# Patient Record
Sex: MALE | Race: WHITE | Employment: FULL TIME | URBAN - NONMETROPOLITAN AREA
[De-identification: names, ages, dates, MRNs, and addresses within clinical notes are randomized per-mention and may not be internally consistent; named-entity substitution may affect disease eponyms.]

---

## 2017-01-04 DIAGNOSIS — G89.29 CHRONIC LOW BACK PAIN, UNSPECIFIED BACK PAIN LATERALITY, WITH SCIATICA PRESENCE UNSPECIFIED: ICD-10-CM

## 2017-01-04 DIAGNOSIS — M54.5 CHRONIC LOW BACK PAIN, UNSPECIFIED BACK PAIN LATERALITY, WITH SCIATICA PRESENCE UNSPECIFIED: ICD-10-CM

## 2017-01-04 DIAGNOSIS — I10 ESSENTIAL HYPERTENSION: ICD-10-CM

## 2017-01-06 RX ORDER — CYCLOBENZAPRINE HCL 10 MG
TABLET ORAL
Qty: 30 TABLET | Refills: 5 | Status: SHIPPED | OUTPATIENT
Start: 2017-01-06 | End: 2017-02-27 | Stop reason: SDUPTHER

## 2017-01-06 RX ORDER — OMEPRAZOLE 20 MG/1
CAPSULE, DELAYED RELEASE ORAL
Qty: 30 CAPSULE | Refills: 5 | Status: SHIPPED | OUTPATIENT
Start: 2017-01-06 | End: 2017-02-27 | Stop reason: SDUPTHER

## 2017-01-06 RX ORDER — NAPROXEN 500 MG/1
TABLET ORAL
Qty: 60 TABLET | Refills: 5 | Status: SHIPPED | OUTPATIENT
Start: 2017-01-06 | End: 2017-06-07

## 2017-01-06 RX ORDER — AMLODIPINE BESYLATE 5 MG/1
TABLET ORAL
Qty: 30 TABLET | Refills: 5 | Status: SHIPPED | OUTPATIENT
Start: 2017-01-06 | End: 2017-02-27 | Stop reason: SDUPTHER

## 2017-01-09 ENCOUNTER — OFFICE VISIT (OUTPATIENT)
Dept: FAMILY MEDICINE CLINIC | Age: 47
End: 2017-01-09

## 2017-01-09 VITALS
DIASTOLIC BLOOD PRESSURE: 84 MMHG | OXYGEN SATURATION: 97 % | SYSTOLIC BLOOD PRESSURE: 120 MMHG | WEIGHT: 297.2 LBS | HEART RATE: 73 BPM | BODY MASS INDEX: 38.14 KG/M2 | HEIGHT: 74 IN

## 2017-01-09 DIAGNOSIS — M54.5 CHRONIC LOW BACK PAIN, UNSPECIFIED BACK PAIN LATERALITY, WITH SCIATICA PRESENCE UNSPECIFIED: Primary | ICD-10-CM

## 2017-01-09 DIAGNOSIS — I10 ESSENTIAL HYPERTENSION: ICD-10-CM

## 2017-01-09 DIAGNOSIS — G89.29 CHRONIC LOW BACK PAIN, UNSPECIFIED BACK PAIN LATERALITY, WITH SCIATICA PRESENCE UNSPECIFIED: Primary | ICD-10-CM

## 2017-01-09 DIAGNOSIS — E66.9 OBESITY, UNSPECIFIED OBESITY SEVERITY, UNSPECIFIED OBESITY TYPE: ICD-10-CM

## 2017-01-09 DIAGNOSIS — K21.9 GASTROESOPHAGEAL REFLUX DISEASE WITHOUT ESOPHAGITIS: ICD-10-CM

## 2017-01-09 PROCEDURE — 99213 OFFICE O/P EST LOW 20 MIN: CPT | Performed by: FAMILY MEDICINE

## 2017-01-09 RX ORDER — CELECOXIB 200 MG/1
200 CAPSULE ORAL 2 TIMES DAILY
Qty: 60 CAPSULE | Refills: 3 | Status: SHIPPED | OUTPATIENT
Start: 2017-01-09 | End: 2017-02-27 | Stop reason: SDUPTHER

## 2017-02-27 DIAGNOSIS — I10 ESSENTIAL HYPERTENSION: ICD-10-CM

## 2017-02-27 DIAGNOSIS — G89.29 CHRONIC LOW BACK PAIN, UNSPECIFIED BACK PAIN LATERALITY, WITH SCIATICA PRESENCE UNSPECIFIED: ICD-10-CM

## 2017-02-27 DIAGNOSIS — M54.5 CHRONIC LOW BACK PAIN, UNSPECIFIED BACK PAIN LATERALITY, WITH SCIATICA PRESENCE UNSPECIFIED: ICD-10-CM

## 2017-02-27 RX ORDER — OMEPRAZOLE 20 MG/1
CAPSULE, DELAYED RELEASE ORAL
Qty: 90 CAPSULE | Refills: 1 | Status: SHIPPED | OUTPATIENT
Start: 2017-02-27 | End: 2017-06-07 | Stop reason: SDUPTHER

## 2017-02-27 RX ORDER — AMLODIPINE BESYLATE 5 MG/1
TABLET ORAL
Qty: 90 TABLET | Refills: 1 | Status: SHIPPED | OUTPATIENT
Start: 2017-02-27 | End: 2017-06-07 | Stop reason: SDUPTHER

## 2017-02-27 RX ORDER — CYCLOBENZAPRINE HCL 10 MG
TABLET ORAL
Qty: 90 TABLET | Refills: 1 | Status: SHIPPED | OUTPATIENT
Start: 2017-02-27 | End: 2017-06-07 | Stop reason: SDUPTHER

## 2017-02-27 RX ORDER — CELECOXIB 200 MG/1
200 CAPSULE ORAL 2 TIMES DAILY
Qty: 180 CAPSULE | Refills: 1 | Status: SHIPPED | OUTPATIENT
Start: 2017-02-27 | End: 2017-08-28 | Stop reason: SDUPTHER

## 2017-06-07 ENCOUNTER — OFFICE VISIT (OUTPATIENT)
Dept: FAMILY MEDICINE CLINIC | Age: 47
End: 2017-06-07

## 2017-06-07 VITALS
WEIGHT: 280 LBS | DIASTOLIC BLOOD PRESSURE: 86 MMHG | HEART RATE: 80 BPM | BODY MASS INDEX: 35.94 KG/M2 | SYSTOLIC BLOOD PRESSURE: 120 MMHG | HEIGHT: 74 IN | OXYGEN SATURATION: 97 %

## 2017-06-07 DIAGNOSIS — Z00.00 PREVENTATIVE HEALTH CARE: ICD-10-CM

## 2017-06-07 DIAGNOSIS — Z11.4 SCREENING FOR HIV (HUMAN IMMUNODEFICIENCY VIRUS): ICD-10-CM

## 2017-06-07 DIAGNOSIS — I10 ESSENTIAL HYPERTENSION: ICD-10-CM

## 2017-06-07 DIAGNOSIS — K21.9 GASTROESOPHAGEAL REFLUX DISEASE, ESOPHAGITIS PRESENCE NOT SPECIFIED: ICD-10-CM

## 2017-06-07 DIAGNOSIS — M54.5 CHRONIC LOW BACK PAIN, UNSPECIFIED BACK PAIN LATERALITY, WITH SCIATICA PRESENCE UNSPECIFIED: ICD-10-CM

## 2017-06-07 DIAGNOSIS — Z00.00 PREVENTATIVE HEALTH CARE: Primary | ICD-10-CM

## 2017-06-07 DIAGNOSIS — G89.29 CHRONIC LOW BACK PAIN, UNSPECIFIED BACK PAIN LATERALITY, WITH SCIATICA PRESENCE UNSPECIFIED: ICD-10-CM

## 2017-06-07 LAB
ALBUMIN SERPL-MCNC: 4.8 G/DL (ref 3.9–4.9)
ALP BLD-CCNC: 55 U/L (ref 35–104)
ALT SERPL-CCNC: 36 U/L (ref 0–41)
ANION GAP SERPL CALCULATED.3IONS-SCNC: 14 MEQ/L (ref 7–13)
AST SERPL-CCNC: 25 U/L (ref 0–40)
BILIRUB SERPL-MCNC: 0.8 MG/DL (ref 0–1.2)
BUN BLDV-MCNC: 16 MG/DL (ref 6–20)
CALCIUM SERPL-MCNC: 9.5 MG/DL (ref 8.6–10.2)
CHLORIDE BLD-SCNC: 100 MEQ/L (ref 98–107)
CHOLESTEROL, TOTAL: 200 MG/DL (ref 0–199)
CO2: 25 MEQ/L (ref 22–29)
CREAT SERPL-MCNC: 0.8 MG/DL (ref 0.7–1.2)
GFR AFRICAN AMERICAN: >60
GFR NON-AFRICAN AMERICAN: >60
GLOBULIN: 2.5 G/DL (ref 2.3–3.5)
GLUCOSE BLD-MCNC: 88 MG/DL (ref 74–109)
HCT VFR BLD CALC: 44 % (ref 42–52)
HDLC SERPL-MCNC: 49 MG/DL (ref 40–59)
HEMOGLOBIN: 14.8 G/DL (ref 14–18)
LDL CHOLESTEROL CALCULATED: 138 MG/DL (ref 0–129)
MCH RBC QN AUTO: 29.8 PG (ref 27–31.3)
MCHC RBC AUTO-ENTMCNC: 33.8 % (ref 33–37)
MCV RBC AUTO: 88.3 FL (ref 80–100)
PDW BLD-RTO: 13.3 % (ref 11.5–14.5)
PLATELET # BLD: 269 K/UL (ref 130–400)
POTASSIUM SERPL-SCNC: 4.8 MEQ/L (ref 3.5–5.1)
RBC # BLD: 4.98 M/UL (ref 4.7–6.1)
SODIUM BLD-SCNC: 139 MEQ/L (ref 132–144)
TOTAL PROTEIN: 7.3 G/DL (ref 6.4–8.1)
TRIGL SERPL-MCNC: 67 MG/DL (ref 0–200)
WBC # BLD: 4.7 K/UL (ref 4.8–10.8)

## 2017-06-07 PROCEDURE — 99396 PREV VISIT EST AGE 40-64: CPT | Performed by: FAMILY MEDICINE

## 2017-06-07 RX ORDER — OMEPRAZOLE 20 MG/1
CAPSULE, DELAYED RELEASE ORAL
Qty: 90 CAPSULE | Refills: 3 | Status: SHIPPED | OUTPATIENT
Start: 2017-06-07 | End: 2018-09-11 | Stop reason: SDUPTHER

## 2017-06-07 RX ORDER — CYCLOBENZAPRINE HCL 10 MG
TABLET ORAL
Qty: 90 TABLET | Refills: 3 | Status: SHIPPED | OUTPATIENT
Start: 2017-06-07 | End: 2018-09-11 | Stop reason: SDUPTHER

## 2017-06-07 RX ORDER — AMLODIPINE BESYLATE 5 MG/1
TABLET ORAL
Qty: 90 TABLET | Refills: 3 | Status: SHIPPED | OUTPATIENT
Start: 2017-06-07 | End: 2018-09-11 | Stop reason: SDUPTHER

## 2017-06-07 RX ORDER — NAPROXEN 500 MG/1
TABLET ORAL
Qty: 60 TABLET | Refills: 5 | Status: CANCELLED | OUTPATIENT
Start: 2017-06-07

## 2017-06-07 RX ORDER — GABAPENTIN 300 MG/1
300 CAPSULE ORAL EVERY EVENING
Qty: 90 CAPSULE | Refills: 3 | Status: SHIPPED | OUTPATIENT
Start: 2017-06-07 | End: 2018-05-27 | Stop reason: SDUPTHER

## 2017-06-07 ASSESSMENT — PATIENT HEALTH QUESTIONNAIRE - PHQ9
1. LITTLE INTEREST OR PLEASURE IN DOING THINGS: 0
SUM OF ALL RESPONSES TO PHQ9 QUESTIONS 1 & 2: 0
SUM OF ALL RESPONSES TO PHQ QUESTIONS 1-9: 0
2. FEELING DOWN, DEPRESSED OR HOPELESS: 0

## 2017-06-09 LAB — HIV-1 AND HIV-2 ANTIBODIES: NEGATIVE

## 2017-08-28 DIAGNOSIS — M54.5 CHRONIC LOW BACK PAIN, UNSPECIFIED BACK PAIN LATERALITY, WITH SCIATICA PRESENCE UNSPECIFIED: ICD-10-CM

## 2017-08-28 DIAGNOSIS — G89.29 CHRONIC LOW BACK PAIN, UNSPECIFIED BACK PAIN LATERALITY, WITH SCIATICA PRESENCE UNSPECIFIED: ICD-10-CM

## 2017-08-28 RX ORDER — CELECOXIB 200 MG/1
CAPSULE ORAL
Qty: 180 CAPSULE | Refills: 1 | Status: SHIPPED | OUTPATIENT
Start: 2017-08-28 | End: 2018-02-11 | Stop reason: SDUPTHER

## 2018-02-11 DIAGNOSIS — M54.5 CHRONIC LOW BACK PAIN, UNSPECIFIED BACK PAIN LATERALITY, WITH SCIATICA PRESENCE UNSPECIFIED: ICD-10-CM

## 2018-02-11 DIAGNOSIS — G89.29 CHRONIC LOW BACK PAIN, UNSPECIFIED BACK PAIN LATERALITY, WITH SCIATICA PRESENCE UNSPECIFIED: ICD-10-CM

## 2018-02-12 RX ORDER — CELECOXIB 200 MG/1
CAPSULE ORAL
Qty: 180 CAPSULE | Refills: 1 | Status: SHIPPED | OUTPATIENT
Start: 2018-02-12 | End: 2018-09-11 | Stop reason: SDUPTHER

## 2018-04-10 ENCOUNTER — PROCEDURE VISIT (OUTPATIENT)
Dept: FAMILY MEDICINE CLINIC | Age: 48
End: 2018-04-10
Payer: COMMERCIAL

## 2018-04-10 DIAGNOSIS — R20.9 DISTURBANCE OF SKIN SENSATION: ICD-10-CM

## 2018-04-10 DIAGNOSIS — K21.9 GASTROESOPHAGEAL REFLUX DISEASE WITHOUT ESOPHAGITIS: ICD-10-CM

## 2018-04-10 DIAGNOSIS — L91.8 INFLAMED SKIN TAG: Primary | ICD-10-CM

## 2018-04-10 PROCEDURE — 11200 RMVL SKIN TAGS UP TO&INC 15: CPT | Performed by: FAMILY MEDICINE

## 2018-04-10 PROCEDURE — 99212 OFFICE O/P EST SF 10 MIN: CPT | Performed by: FAMILY MEDICINE

## 2018-05-27 DIAGNOSIS — M54.5 CHRONIC LOW BACK PAIN, UNSPECIFIED BACK PAIN LATERALITY, WITH SCIATICA PRESENCE UNSPECIFIED: ICD-10-CM

## 2018-05-27 DIAGNOSIS — G89.29 CHRONIC LOW BACK PAIN, UNSPECIFIED BACK PAIN LATERALITY, WITH SCIATICA PRESENCE UNSPECIFIED: ICD-10-CM

## 2018-05-29 RX ORDER — GABAPENTIN 300 MG/1
300 CAPSULE ORAL EVERY EVENING
Qty: 90 CAPSULE | Refills: 3 | Status: SHIPPED | OUTPATIENT
Start: 2018-05-29 | End: 2019-06-10 | Stop reason: SDUPTHER

## 2018-06-25 ENCOUNTER — OFFICE VISIT (OUTPATIENT)
Dept: FAMILY MEDICINE CLINIC | Age: 48
End: 2018-06-25
Payer: COMMERCIAL

## 2018-06-25 VITALS
WEIGHT: 284 LBS | DIASTOLIC BLOOD PRESSURE: 84 MMHG | HEART RATE: 89 BPM | HEIGHT: 74 IN | OXYGEN SATURATION: 98 % | BODY MASS INDEX: 36.45 KG/M2 | TEMPERATURE: 98.6 F | SYSTOLIC BLOOD PRESSURE: 124 MMHG

## 2018-06-25 DIAGNOSIS — Z00.00 WELLNESS EXAMINATION: Primary | ICD-10-CM

## 2018-06-25 DIAGNOSIS — Z72.0 TOBACCO USE: ICD-10-CM

## 2018-06-25 DIAGNOSIS — Z00.00 WELLNESS EXAMINATION: ICD-10-CM

## 2018-06-25 LAB
ALBUMIN SERPL-MCNC: 4.9 G/DL (ref 3.9–4.9)
ALP BLD-CCNC: 57 U/L (ref 35–104)
ALT SERPL-CCNC: 46 U/L (ref 0–41)
ANION GAP SERPL CALCULATED.3IONS-SCNC: 15 MEQ/L (ref 7–13)
AST SERPL-CCNC: 27 U/L (ref 0–40)
BILIRUB SERPL-MCNC: 1.1 MG/DL (ref 0–1.2)
BUN BLDV-MCNC: 18 MG/DL (ref 6–20)
CALCIUM SERPL-MCNC: 9.6 MG/DL (ref 8.6–10.2)
CHLORIDE BLD-SCNC: 99 MEQ/L (ref 98–107)
CHOLESTEROL, TOTAL: 198 MG/DL (ref 0–199)
CO2: 25 MEQ/L (ref 22–29)
CREAT SERPL-MCNC: 0.94 MG/DL (ref 0.7–1.2)
GFR AFRICAN AMERICAN: >60
GFR NON-AFRICAN AMERICAN: >60
GLOBULIN: 2.9 G/DL (ref 2.3–3.5)
GLUCOSE BLD-MCNC: 69 MG/DL (ref 74–109)
HCT VFR BLD CALC: 44.2 % (ref 42–52)
HDLC SERPL-MCNC: 47 MG/DL (ref 40–59)
HEMOGLOBIN: 14.9 G/DL (ref 14–18)
LDL CHOLESTEROL CALCULATED: 139 MG/DL (ref 0–129)
MCH RBC QN AUTO: 29.9 PG (ref 27–31.3)
MCHC RBC AUTO-ENTMCNC: 33.8 % (ref 33–37)
MCV RBC AUTO: 88.7 FL (ref 80–100)
PDW BLD-RTO: 13.6 % (ref 11.5–14.5)
PLATELET # BLD: 305 K/UL (ref 130–400)
POTASSIUM SERPL-SCNC: 4.4 MEQ/L (ref 3.5–5.1)
RBC # BLD: 4.99 M/UL (ref 4.7–6.1)
SODIUM BLD-SCNC: 139 MEQ/L (ref 132–144)
TOTAL PROTEIN: 7.8 G/DL (ref 6.4–8.1)
TRIGL SERPL-MCNC: 59 MG/DL (ref 0–200)
WBC # BLD: 7.5 K/UL (ref 4.8–10.8)

## 2018-06-25 PROCEDURE — 99396 PREV VISIT EST AGE 40-64: CPT | Performed by: FAMILY MEDICINE

## 2018-06-25 ASSESSMENT — PATIENT HEALTH QUESTIONNAIRE - PHQ9
1. LITTLE INTEREST OR PLEASURE IN DOING THINGS: 0
SUM OF ALL RESPONSES TO PHQ9 QUESTIONS 1 & 2: 0
SUM OF ALL RESPONSES TO PHQ QUESTIONS 1-9: 0

## 2018-09-11 DIAGNOSIS — K21.9 GASTROESOPHAGEAL REFLUX DISEASE, ESOPHAGITIS PRESENCE NOT SPECIFIED: ICD-10-CM

## 2018-09-11 DIAGNOSIS — M54.5 CHRONIC LOW BACK PAIN, UNSPECIFIED BACK PAIN LATERALITY, WITH SCIATICA PRESENCE UNSPECIFIED: ICD-10-CM

## 2018-09-11 DIAGNOSIS — G89.29 CHRONIC LOW BACK PAIN, UNSPECIFIED BACK PAIN LATERALITY, WITH SCIATICA PRESENCE UNSPECIFIED: ICD-10-CM

## 2018-09-11 DIAGNOSIS — I10 ESSENTIAL HYPERTENSION: ICD-10-CM

## 2018-09-11 RX ORDER — OMEPRAZOLE 20 MG/1
CAPSULE, DELAYED RELEASE ORAL
Qty: 90 CAPSULE | Refills: 3 | Status: SHIPPED | OUTPATIENT
Start: 2018-09-11 | End: 2019-06-10 | Stop reason: SDUPTHER

## 2018-09-11 RX ORDER — AMLODIPINE BESYLATE 5 MG/1
TABLET ORAL
Qty: 90 TABLET | Refills: 3 | Status: SHIPPED | OUTPATIENT
Start: 2018-09-11 | End: 2019-06-10 | Stop reason: SDUPTHER

## 2018-09-11 RX ORDER — CELECOXIB 200 MG/1
CAPSULE ORAL
Qty: 180 CAPSULE | Refills: 1 | Status: SHIPPED | OUTPATIENT
Start: 2018-09-11 | End: 2019-06-10 | Stop reason: SDUPTHER

## 2018-09-11 RX ORDER — CYCLOBENZAPRINE HCL 10 MG
TABLET ORAL
Qty: 90 TABLET | Refills: 3 | Status: SHIPPED | OUTPATIENT
Start: 2018-09-11 | End: 2019-06-10 | Stop reason: SDUPTHER

## 2019-06-10 ENCOUNTER — OFFICE VISIT (OUTPATIENT)
Dept: FAMILY MEDICINE CLINIC | Age: 49
End: 2019-06-10
Payer: COMMERCIAL

## 2019-06-10 VITALS
WEIGHT: 300 LBS | HEIGHT: 74 IN | DIASTOLIC BLOOD PRESSURE: 68 MMHG | SYSTOLIC BLOOD PRESSURE: 132 MMHG | BODY MASS INDEX: 38.5 KG/M2 | OXYGEN SATURATION: 98 % | HEART RATE: 79 BPM

## 2019-06-10 DIAGNOSIS — Z00.00 WELLNESS EXAMINATION: ICD-10-CM

## 2019-06-10 DIAGNOSIS — Z00.00 WELLNESS EXAMINATION: Primary | ICD-10-CM

## 2019-06-10 DIAGNOSIS — K21.9 GASTROESOPHAGEAL REFLUX DISEASE, ESOPHAGITIS PRESENCE NOT SPECIFIED: ICD-10-CM

## 2019-06-10 DIAGNOSIS — M54.5 CHRONIC LOW BACK PAIN, UNSPECIFIED BACK PAIN LATERALITY, WITH SCIATICA PRESENCE UNSPECIFIED: ICD-10-CM

## 2019-06-10 DIAGNOSIS — I10 ESSENTIAL HYPERTENSION: ICD-10-CM

## 2019-06-10 DIAGNOSIS — G89.29 CHRONIC LOW BACK PAIN, UNSPECIFIED BACK PAIN LATERALITY, WITH SCIATICA PRESENCE UNSPECIFIED: ICD-10-CM

## 2019-06-10 LAB
ALBUMIN SERPL-MCNC: 4.6 G/DL (ref 3.5–4.6)
ALP BLD-CCNC: 56 U/L (ref 35–104)
ALT SERPL-CCNC: 32 U/L (ref 0–41)
ANION GAP SERPL CALCULATED.3IONS-SCNC: 12 MEQ/L (ref 9–15)
AST SERPL-CCNC: 18 U/L (ref 0–40)
BILIRUB SERPL-MCNC: 0.7 MG/DL (ref 0.2–0.7)
BUN BLDV-MCNC: 16 MG/DL (ref 6–20)
CALCIUM SERPL-MCNC: 9.3 MG/DL (ref 8.5–9.9)
CHLORIDE BLD-SCNC: 101 MEQ/L (ref 95–107)
CHOLESTEROL, TOTAL: 162 MG/DL (ref 0–199)
CO2: 27 MEQ/L (ref 20–31)
CREAT SERPL-MCNC: 0.89 MG/DL (ref 0.7–1.2)
GFR AFRICAN AMERICAN: >60
GFR NON-AFRICAN AMERICAN: >60
GLOBULIN: 2.7 G/DL (ref 2.3–3.5)
GLUCOSE BLD-MCNC: 85 MG/DL (ref 70–99)
HCT VFR BLD CALC: 41.6 % (ref 42–52)
HDLC SERPL-MCNC: 42 MG/DL (ref 40–59)
HEMOGLOBIN: 14.7 G/DL (ref 14–18)
LDL CHOLESTEROL CALCULATED: 105 MG/DL (ref 0–129)
MCH RBC QN AUTO: 31.3 PG (ref 27–31.3)
MCHC RBC AUTO-ENTMCNC: 35.3 % (ref 33–37)
MCV RBC AUTO: 88.7 FL (ref 80–100)
PDW BLD-RTO: 13.3 % (ref 11.5–14.5)
PLATELET # BLD: 281 K/UL (ref 130–400)
POTASSIUM SERPL-SCNC: 4.5 MEQ/L (ref 3.4–4.9)
PROSTATE SPECIFIC ANTIGEN: 1.07 NG/ML
RBC # BLD: 4.68 M/UL (ref 4.7–6.1)
SODIUM BLD-SCNC: 140 MEQ/L (ref 135–144)
TOTAL PROTEIN: 7.3 G/DL (ref 6.3–8)
TRIGL SERPL-MCNC: 76 MG/DL (ref 0–150)
WBC # BLD: 6 K/UL (ref 4.8–10.8)

## 2019-06-10 PROCEDURE — 99396 PREV VISIT EST AGE 40-64: CPT | Performed by: FAMILY MEDICINE

## 2019-06-10 RX ORDER — CELECOXIB 200 MG/1
CAPSULE ORAL
Qty: 180 CAPSULE | Refills: 2 | Status: SHIPPED | OUTPATIENT
Start: 2019-06-10 | End: 2020-01-30 | Stop reason: SDUPTHER

## 2019-06-10 RX ORDER — GABAPENTIN 300 MG/1
300 CAPSULE ORAL EVERY EVENING
Qty: 90 CAPSULE | Refills: 3 | Status: SHIPPED | OUTPATIENT
Start: 2019-06-10 | End: 2020-09-10

## 2019-06-10 RX ORDER — OMEPRAZOLE 20 MG/1
CAPSULE, DELAYED RELEASE ORAL
Qty: 90 CAPSULE | Refills: 3 | Status: SHIPPED | OUTPATIENT
Start: 2019-06-10 | End: 2020-01-30 | Stop reason: SDUPTHER

## 2019-06-10 RX ORDER — CYCLOBENZAPRINE HCL 10 MG
TABLET ORAL
Qty: 90 TABLET | Refills: 3 | Status: SHIPPED | OUTPATIENT
Start: 2019-06-10 | End: 2020-01-30 | Stop reason: SDUPTHER

## 2019-06-10 RX ORDER — AMLODIPINE BESYLATE 5 MG/1
TABLET ORAL
Qty: 90 TABLET | Refills: 3 | Status: SHIPPED | OUTPATIENT
Start: 2019-06-10 | End: 2020-01-30 | Stop reason: SDUPTHER

## 2019-06-10 ASSESSMENT — PATIENT HEALTH QUESTIONNAIRE - PHQ9
SUM OF ALL RESPONSES TO PHQ QUESTIONS 1-9: 0
1. LITTLE INTEREST OR PLEASURE IN DOING THINGS: 0
SUM OF ALL RESPONSES TO PHQ QUESTIONS 1-9: 0
SUM OF ALL RESPONSES TO PHQ9 QUESTIONS 1 & 2: 0
2. FEELING DOWN, DEPRESSED OR HOPELESS: 0

## 2019-06-10 NOTE — PROGRESS NOTES
Subjective:      Chief Complaint   Patient presents with    Annual Exam     be well exam        Bridgette Curiel is a 50 y.o. male     Here for checkup  Needs Annual labs  Only issue is plantar fasciitis    Active     Does use e-cig    RE: chronic low back pain    Previous treatments: injections in lower back. Had been on percocet for many years  Scared of the medication  Feels needs something else  He is taking celebrex    Chronic issues under control with current regimen   See ROS     No concerns with meds  Compliant with therapy  Trying to be active    Wt Readings from Last 3 Encounters:   06/10/19 300 lb (136.1 kg)   06/25/18 284 lb (128.8 kg)   06/07/17 280 lb (127 kg)         Past Medical History:   Diagnosis Date    Chronic back pain     GERD (gastroesophageal reflux disease)     Heartburn     HTN (hypertension)      Past Surgical History:   Procedure Laterality Date    APPENDECTOMY      BACK SURGERY      NECK SURGERY      PILONIDAL CYST EXCISION  2005    TONSILLECTOMY      UPPER GASTROINTESTINAL ENDOSCOPY  2008    Hiatal hernia, normal stomach, normal duodenum     Family History   Problem Relation Age of Onset    High Blood Pressure Father     Diabetes Father     Crohn's Disease Brother      Social History     Socioeconomic History    Marital status:      Spouse name: None    Number of children: None    Years of education: None    Highest education level: None   Occupational History    None   Social Needs    Financial resource strain: None    Food insecurity:     Worry: None     Inability: None    Transportation needs:     Medical: None     Non-medical: None   Tobacco Use    Smoking status: Current Every Day Smoker     Packs/day: 0.00    Smokeless tobacco: Never Used    Tobacco comment: Vapor   Substance and Sexual Activity    Alcohol use:  Yes     Alcohol/week: 0.0 oz     Comment: occasionally    Drug use: No    Sexual activity: None   Lifestyle    Physical activity: Days per week: None     Minutes per session: None    Stress: None   Relationships    Social connections:     Talks on phone: None     Gets together: None     Attends Mormon service: None     Active member of club or organization: None     Attends meetings of clubs or organizations: None     Relationship status: None    Intimate partner violence:     Fear of current or ex partner: None     Emotionally abused: None     Physically abused: None     Forced sexual activity: None   Other Topics Concern    None   Social History Narrative    None     Current Outpatient Medications   Medication Sig Dispense Refill    gabapentin (NEURONTIN) 300 MG capsule Take 1 capsule by mouth every evening for 180 days. 90 capsule 3    omeprazole (PRILOSEC) 20 MG delayed release capsule TAKE 1 CAPSULE BY MOUTH ONE TIME DAILY 90 capsule 3    amLODIPine (NORVASC) 5 MG tablet TAKE 1 TABLET BY MOUTH ONE TIME DAILY 90 tablet 3    celecoxib (CELEBREX) 200 MG capsule TAKE ONE CAPSULE BY MOUTH TWICE A  capsule 2    cyclobenzaprine (FLEXERIL) 10 MG tablet TAKE 1 TABLET BY MOUTH ONE TIME DAILY 90 tablet 3     No current facility-administered medications for this visit. No Known Allergies      Review of Systems:   General ROS: negative for - nausea, vomiting, chills, fatigue, fever, malaise, weight gain or weight loss  Respiratory ROS: no cough, shortness of breath, or wheezing  Cardiovascular ROS: no chest pain or dyspnea on exertion  Gastrointestinal ROS: no abdominal pain, change in bowel habits, or black or bloody stools  Genito-Urinary ROS: no dysuria, trouble voiding, or hematuria  Musculoskeletal ROS: chronic low back pain  Neurological ROS: negative for - behavioral changes, memory loss, numbness/tingling, tremors or weakness    Exercise: walking at work, not really with extra exercise    In general patient otherwise reports feeling well.        Objective:     Physical Exam:  /68   Pulse 79   Ht 6' 2\" (1.88 m) Wt 300 lb (136.1 kg)   SpO2 98%   BMI 38.52 kg/m²     Waist Circumference 47in    Gen: Well, NAD, Alert, Oriented x 3   HEENT: EOMI, eyes clear, MMM  Skin: without rash or jaundice  Neck: no significant lymphadenopathy or thyromegaly  Lungs: CTA B w/out Rales/Wheezes/Rhonchi, Good respiratory effort   Heart: RRR, S1S2, w/out M/R/G, non-displaced PMI   Neuro: Neurovascularly intact w/ Sensory/Motor intact UE/LE Bilaterally. He is generally comfortable at this time in the office      Assessment:      Diagnosis Orders   1. Wellness examination  CBC    Comprehensive Metabolic Panel    Lipid Panel    Psa screening   2. Gastroesophageal reflux disease, esophagitis presence not specified  omeprazole (PRILOSEC) 20 MG delayed release capsule   3. Chronic low back pain, unspecified back pain laterality, with sciatica presence unspecified  gabapentin (NEURONTIN) 300 MG capsule    celecoxib (CELEBREX) 200 MG capsule    cyclobenzaprine (FLEXERIL) 10 MG tablet   4. Essential hypertension  amLODIPine (NORVASC) 5 MG tablet        Plan:     Refills given  Labs as ordered    Patient Counseling:  --Nutrition: Stressed importance of moderation in sodium/caffeine intake, saturated fat and cholesterol, caloric balance, sufficient intake of fresh fruits, vegetables, fiber-  --Exercise: Stressed the importance of regular exercise. --Dental health: Discussed importance of regulardental visits.   --Immunizations reviewed UTD    Discussed diet and exercise  Weight loss              Bong Lares MD

## 2019-09-17 DIAGNOSIS — G89.29 CHRONIC LOW BACK PAIN, UNSPECIFIED BACK PAIN LATERALITY, WITH SCIATICA PRESENCE UNSPECIFIED: ICD-10-CM

## 2019-09-17 DIAGNOSIS — M54.5 CHRONIC LOW BACK PAIN, UNSPECIFIED BACK PAIN LATERALITY, WITH SCIATICA PRESENCE UNSPECIFIED: ICD-10-CM

## 2019-09-18 RX ORDER — GABAPENTIN 300 MG/1
CAPSULE ORAL
Qty: 90 CAPSULE | Refills: 1 | Status: SHIPPED | OUTPATIENT
Start: 2019-09-18 | End: 2020-01-30 | Stop reason: SDUPTHER

## 2020-01-30 ENCOUNTER — OFFICE VISIT (OUTPATIENT)
Dept: FAMILY MEDICINE CLINIC | Age: 50
End: 2020-01-30
Payer: COMMERCIAL

## 2020-01-30 VITALS
OXYGEN SATURATION: 97 % | BODY MASS INDEX: 37.86 KG/M2 | DIASTOLIC BLOOD PRESSURE: 84 MMHG | WEIGHT: 295 LBS | HEIGHT: 74 IN | HEART RATE: 78 BPM | SYSTOLIC BLOOD PRESSURE: 118 MMHG

## 2020-01-30 DIAGNOSIS — M79.675 TOE PAIN, LEFT: ICD-10-CM

## 2020-01-30 DIAGNOSIS — B00.89 HERPETIC WHITLOW: ICD-10-CM

## 2020-01-30 DIAGNOSIS — I10 ESSENTIAL HYPERTENSION: ICD-10-CM

## 2020-01-30 LAB
ALBUMIN SERPL-MCNC: 4.7 G/DL (ref 3.5–4.6)
ALP BLD-CCNC: 57 U/L (ref 35–104)
ALT SERPL-CCNC: 44 U/L (ref 0–41)
ANION GAP SERPL CALCULATED.3IONS-SCNC: 19 MEQ/L (ref 9–15)
AST SERPL-CCNC: 26 U/L (ref 0–40)
BILIRUB SERPL-MCNC: 1.1 MG/DL (ref 0.2–0.7)
BUN BLDV-MCNC: 15 MG/DL (ref 6–20)
CALCIUM SERPL-MCNC: 9.5 MG/DL (ref 8.5–9.9)
CHLORIDE BLD-SCNC: 102 MEQ/L (ref 95–107)
CHOLESTEROL, TOTAL: 172 MG/DL (ref 0–199)
CO2: 23 MEQ/L (ref 20–31)
CREAT SERPL-MCNC: 0.81 MG/DL (ref 0.7–1.2)
GFR AFRICAN AMERICAN: >60
GFR NON-AFRICAN AMERICAN: >60
GLOBULIN: 3 G/DL (ref 2.3–3.5)
GLUCOSE BLD-MCNC: 79 MG/DL (ref 70–99)
HCT VFR BLD CALC: 47.2 % (ref 42–52)
HDLC SERPL-MCNC: 41 MG/DL (ref 40–59)
HEMOGLOBIN: 16.1 G/DL (ref 14–18)
LDL CHOLESTEROL CALCULATED: 119 MG/DL (ref 0–129)
MCH RBC QN AUTO: 30.2 PG (ref 27–31.3)
MCHC RBC AUTO-ENTMCNC: 34.1 % (ref 33–37)
MCV RBC AUTO: 88.6 FL (ref 80–100)
PDW BLD-RTO: 13.5 % (ref 11.5–14.5)
PLATELET # BLD: 330 K/UL (ref 130–400)
POTASSIUM SERPL-SCNC: 4.3 MEQ/L (ref 3.4–4.9)
RBC # BLD: 5.32 M/UL (ref 4.7–6.1)
SODIUM BLD-SCNC: 144 MEQ/L (ref 135–144)
TOTAL PROTEIN: 7.7 G/DL (ref 6.3–8)
TRIGL SERPL-MCNC: 61 MG/DL (ref 0–150)
URIC ACID, SERUM: 6.8 MG/DL (ref 3.4–7)
WBC # BLD: 6.5 K/UL (ref 4.8–10.8)

## 2020-01-30 PROCEDURE — 99214 OFFICE O/P EST MOD 30 MIN: CPT | Performed by: FAMILY MEDICINE

## 2020-01-30 RX ORDER — CYCLOBENZAPRINE HCL 10 MG
TABLET ORAL
Qty: 90 TABLET | Refills: 3 | Status: SHIPPED | OUTPATIENT
Start: 2020-01-30 | End: 2022-09-23 | Stop reason: SDUPTHER

## 2020-01-30 RX ORDER — METHYLPREDNISOLONE 4 MG/1
TABLET ORAL
Qty: 1 KIT | Refills: 0 | Status: SHIPPED | OUTPATIENT
Start: 2020-01-30 | End: 2020-08-20

## 2020-01-30 RX ORDER — GABAPENTIN 300 MG/1
CAPSULE ORAL
Qty: 90 CAPSULE | Refills: 1 | Status: SHIPPED | OUTPATIENT
Start: 2020-01-30 | End: 2020-08-20 | Stop reason: SDUPTHER

## 2020-01-30 RX ORDER — OMEPRAZOLE 20 MG/1
CAPSULE, DELAYED RELEASE ORAL
Qty: 90 CAPSULE | Refills: 3 | Status: SHIPPED | OUTPATIENT
Start: 2020-01-30 | End: 2021-03-08

## 2020-01-30 RX ORDER — AMLODIPINE BESYLATE 5 MG/1
TABLET ORAL
Qty: 90 TABLET | Refills: 3 | Status: SHIPPED | OUTPATIENT
Start: 2020-01-30 | End: 2021-03-08

## 2020-01-30 RX ORDER — CELECOXIB 200 MG/1
CAPSULE ORAL
Qty: 180 CAPSULE | Refills: 2 | Status: SHIPPED | OUTPATIENT
Start: 2020-01-30 | End: 2020-12-07

## 2020-01-30 RX ORDER — VALACYCLOVIR HYDROCHLORIDE 1 G/1
1000 TABLET, FILM COATED ORAL 3 TIMES DAILY
Qty: 21 TABLET | Refills: 0 | Status: SHIPPED | OUTPATIENT
Start: 2020-01-30 | End: 2020-02-06

## 2020-01-30 ASSESSMENT — PATIENT HEALTH QUESTIONNAIRE - PHQ9
2. FEELING DOWN, DEPRESSED OR HOPELESS: 0
SUM OF ALL RESPONSES TO PHQ9 QUESTIONS 1 & 2: 0
SUM OF ALL RESPONSES TO PHQ QUESTIONS 1-9: 0
1. LITTLE INTEREST OR PLEASURE IN DOING THINGS: 0
SUM OF ALL RESPONSES TO PHQ QUESTIONS 1-9: 0

## 2020-01-30 NOTE — PROGRESS NOTES
behavioral changes, memory loss, numbness/tingling, tremors or weakness    Exercise: walking at work, not really with extra exercise    In general patient otherwise reports feeling well. Objective:     Physical Exam:  /84 (Site: Right Upper Arm)   Pulse 78   Ht 6' 2\" (1.88 m)   Wt 295 lb (133.8 kg)   SpO2 97%   BMI 37.88 kg/m²         Gen: Well, NAD, Alert, Oriented x 3   HEENT: EOMI, eyes clear, MMM  Skin: without rash or jaundice  Neck: no significant lymphadenopathy or thyromegaly  Lungs: CTA B w/out Rales/Wheezes/Rhonchi, Good respiratory effort   Heart: RRR, S1S2, w/out M/R/G, non-displaced PMI   Neuro: Neurovascularly intact w/ Sensory/Motor intact UE/LE Bilaterally. He is generally comfortable at this time in the office  The left 2nd toe has some mottling of the skin, it is sore to touch, it is warm and well-perfused    Left lateral chest wall tender with no rash        Assessment:      Diagnosis Orders   1. Herpetic misty  Hsv 1/2 Abs With Reflex    valACYclovir (VALTREX) 1 g tablet   2. Essential hypertension  amLODIPine (NORVASC) 5 MG tablet    CBC    Comprehensive Metabolic Panel    Lipid Panel   3. Chronic low back pain  celecoxib (CELEBREX) 200 MG capsule    cyclobenzaprine (FLEXERIL) 10 MG tablet    gabapentin (NEURONTIN) 300 MG capsule   4. Gastroesophageal reflux disease, esophagitis presence not specified  omeprazole (PRILOSEC) 20 MG delayed release capsule   5.  Toe pain, left  Uric Acid    methylPREDNISolone (MEDROL DOSEPACK) 4 MG tablet        Plan:     Refills given  Labs as ordered      Possible herpetic misty of toe  Medrol and valtrex  If worsens, ER      Discussed diet and exercise  Weight loss              Tony Chiang MD

## 2020-02-02 LAB
HERPES TYPE 1/2 IGM COMBINED: 0.89 IV
HERPES TYPE I/II IGG COMBINED: 0.49 IV

## 2020-08-18 NOTE — PROGRESS NOTES
2020    Feliberto Dejesus (:  1970) is a 52 y.o. male, here for evaluation of the following medical concerns:  Chief Complaint   Patient presents with    Knee Pain     Left Knee swelling and pain. Been over a month. HPI  Left knee injury July  ------------------------------  Started after  weekend  [place] and felt a pop  Had severe pain afterwards, no immediate swelling or bruising  Progressively getting worse  Intermittent swelling, tends to be worse at the end of the day  Dull pain during the morning which intensifies throughout the day - on his feet a lot for work  Walking with a limp  No prior knee issues    Review of Systems   Constitutional: Negative for chills and fever. HENT: Negative for congestion, sinus pressure and sore throat. Respiratory: Negative for cough and shortness of breath. Cardiovascular: Negative for chest pain and palpitations. Gastrointestinal: Negative for abdominal pain and vomiting. Musculoskeletal: Positive for joint swelling. Negative for arthralgias and myalgias. Left knee pain   Skin: Negative for rash and wound. Neurological: Negative for speech difficulty and light-headedness. Psychiatric/Behavioral: Negative for suicidal ideas. The patient is not nervous/anxious. Prior to Visit Medications    Medication Sig Taking? Authorizing Provider   amLODIPine (NORVASC) 5 MG tablet TAKE 1 TABLET BY MOUTH ONE TIME DAILY Yes Edd Spear MD   celecoxib (CELEBREX) 200 MG capsule TAKE ONE CAPSULE BY MOUTH TWICE A DAY Yes Edd Spear MD   cyclobenzaprine (FLEXERIL) 10 MG tablet TAKE 1 TABLET BY MOUTH ONE TIME DAILY Yes Edd Spear MD   omeprazole (PRILOSEC) 20 MG delayed release capsule TAKE 1 CAPSULE BY MOUTH ONE TIME DAILY Yes Edd Spear MD   gabapentin (NEURONTIN) 300 MG capsule Take 1 capsule by mouth every evening for 180 days.  Yes Edd Spear MD        No Known Allergies    Past Medical History:   Diagnosis Date    Chronic back pain     GERD (gastroesophageal reflux disease)     Heartburn     HTN (hypertension)        Past Surgical History:   Procedure Laterality Date    APPENDECTOMY      BACK SURGERY      NECK SURGERY      PILONIDAL CYST EXCISION  2005    TONSILLECTOMY      UPPER GASTROINTESTINAL ENDOSCOPY  2008    Hiatal hernia, normal stomach, normal duodenum       Social History     Socioeconomic History    Marital status:      Spouse name: Not on file    Number of children: Not on file    Years of education: Not on file    Highest education level: Not on file   Occupational History    Not on file   Social Needs    Financial resource strain: Not on file    Food insecurity     Worry: Not on file     Inability: Not on file    Transportation needs     Medical: Not on file     Non-medical: Not on file   Tobacco Use    Smoking status: Current Every Day Smoker     Packs/day: 0.00    Smokeless tobacco: Never Used    Tobacco comment: Vapor   Substance and Sexual Activity    Alcohol use:  Yes     Alcohol/week: 0.0 standard drinks     Comment: occasionally    Drug use: No    Sexual activity: Not on file   Lifestyle    Physical activity     Days per week: Not on file     Minutes per session: Not on file    Stress: Not on file   Relationships    Social connections     Talks on phone: Not on file     Gets together: Not on file     Attends Mu-ism service: Not on file     Active member of club or organization: Not on file     Attends meetings of clubs or organizations: Not on file     Relationship status: Not on file    Intimate partner violence     Fear of current or ex partner: Not on file     Emotionally abused: Not on file     Physically abused: Not on file     Forced sexual activity: Not on file   Other Topics Concern    Not on file   Social History Narrative    Not on file        Family History   Problem Relation Age of Onset    High of the medication prescribed today, as well as treatment plan/ rationale and result expectations have been discussed with the patient who expresses understanding and desires to proceed. Close follow up to evaluate treatment results and for coordination of care. I have reviewed the patient's medical history in detail and updated the computerized patient record. As always, patient is advised that if symptoms worsen in any way they will proceed to the nearest emergency room. --------------------------------------------------------------------    Return in about 4 weeks (around 9/17/2020) for Knee pain. An  electronic signature was used to authenticate this note.     --Carole Condon DO on 8/20/2020 at 8:36 AM

## 2020-08-20 ENCOUNTER — OFFICE VISIT (OUTPATIENT)
Dept: FAMILY MEDICINE CLINIC | Age: 50
End: 2020-08-20
Payer: COMMERCIAL

## 2020-08-20 VITALS
TEMPERATURE: 97.2 F | WEIGHT: 290 LBS | HEART RATE: 75 BPM | HEIGHT: 74 IN | DIASTOLIC BLOOD PRESSURE: 72 MMHG | BODY MASS INDEX: 37.22 KG/M2 | OXYGEN SATURATION: 98 % | SYSTOLIC BLOOD PRESSURE: 118 MMHG

## 2020-08-20 PROCEDURE — 99213 OFFICE O/P EST LOW 20 MIN: CPT | Performed by: STUDENT IN AN ORGANIZED HEALTH CARE EDUCATION/TRAINING PROGRAM

## 2020-08-20 ASSESSMENT — ENCOUNTER SYMPTOMS
COUGH: 0
ABDOMINAL PAIN: 0
SINUS PRESSURE: 0
SHORTNESS OF BREATH: 0
VOMITING: 0
SORE THROAT: 0

## 2020-08-20 NOTE — PATIENT INSTRUCTIONS
Patient Education        Joint Pain: Care Instructions  Your Care Instructions     Many people have small aches and pains from overuse or injury to muscles and joints. Joint injuries often happen during sports or recreation, work tasks, or projects around the home. An overuse injury can happen when you put too much stress on a joint or when you do an activity that stresses the joint over and over, such as using the computer or rowing a boat. You can take action at home to help your muscles and joints get better. You should feel better in 1 to 2 weeks, but it can take 3 months or more to heal completely. Follow-up care is a key part of your treatment and safety. Be sure to make and go to all appointments, and call your doctor if you are having problems. It's also a good idea to know your test results and keep a list of the medicines you take. How can you care for yourself at home? · Do not put weight on the injured joint for at least a day or two. · For the first day or two after an injury, do not take hot showers or baths, and do not use hot packs. The heat could make swelling worse. · Put ice or a cold pack on the sore joint for 10 to 20 minutes at a time. Try to do this every 1 to 2 hours for the next 3 days (when you are awake) or until the swelling goes down. Put a thin cloth between the ice and your skin. · Wrap the injury in an elastic bandage. Do not wrap it too tightly because this can cause more swelling. · Prop up the sore joint on a pillow when you ice it or anytime you sit or lie down during the next 3 days. Try to keep it above the level of your heart. This will help reduce swelling. · Take an over-the-counter pain medicine, such as acetaminophen (Tylenol), ibuprofen (Advil, Motrin), or naproxen (Aleve). Read and follow all instructions on the label. · After 1 or 2 days of rest, begin moving the joint gently.  While the joint is still healing, you can begin to exercise using activities that

## 2020-09-03 ENCOUNTER — HOSPITAL ENCOUNTER (OUTPATIENT)
Dept: MRI IMAGING | Age: 50
Discharge: HOME | End: 2020-09-05
Payer: COMMERCIAL

## 2020-09-03 PROCEDURE — 73721 MRI JNT OF LWR EXTRE W/O DYE: CPT

## 2020-09-09 ENCOUNTER — OFFICE VISIT (OUTPATIENT)
Dept: ORTHOPEDIC SURGERY | Age: 50
End: 2020-09-09
Payer: COMMERCIAL

## 2020-09-09 VITALS
TEMPERATURE: 97.2 F | OXYGEN SATURATION: 97 % | WEIGHT: 290 LBS | BODY MASS INDEX: 37.22 KG/M2 | HEART RATE: 61 BPM | HEIGHT: 74 IN

## 2020-09-09 VITALS
OXYGEN SATURATION: 98 % | TEMPERATURE: 97.1 F | WEIGHT: 290 LBS | HEIGHT: 74 IN | HEART RATE: 78 BPM | BODY MASS INDEX: 37.22 KG/M2

## 2020-09-09 PROBLEM — M17.12 ARTHRITIS OF LEFT KNEE: Status: ACTIVE | Noted: 2020-09-09

## 2020-09-09 PROBLEM — S83.242A ACUTE MEDIAL MENISCUS TEAR, LEFT, INITIAL ENCOUNTER: Status: ACTIVE | Noted: 2020-09-09

## 2020-09-09 PROCEDURE — 99213 OFFICE O/P EST LOW 20 MIN: CPT | Performed by: ORTHOPAEDIC SURGERY

## 2020-09-09 PROCEDURE — 99203 OFFICE O/P NEW LOW 30 MIN: CPT | Performed by: ORTHOPAEDIC SURGERY

## 2020-09-09 ASSESSMENT — ENCOUNTER SYMPTOMS
SORE THROAT: 0
ABDOMINAL PAIN: 0
SHORTNESS OF BREATH: 0

## 2020-09-09 NOTE — PROGRESS NOTES
Subjective:      Patient ID: Stanley Avila is a 52 y.o. male who presents today for:  Chief Complaint   Patient presents with    Knee Pain     pt seen Dr. Liliam Scott this morning. HPI  Mr. Sarita Narayanan is a 42-year-old gentleman here for evaluation of pain in his left knee. He was referred to me by after seeing Dr. Tutu Reardon this morning for the potential for surgical intervention for a left knee medial meniscal tear. Yvansloanerosa m says he was walking [place] and felt a sudden pop in his knee on July 4 he had persistent pain for the past 5 weeks on the medial aspect of the knee. He did see his medical doctor and then was referred to us for further care and treatment after having an MRI. Aris Carolina is some this morning felt he might benefit from a medial meniscectomy and referred to me for further care and treatment. The patient has never had surgery the knee in the past.  He is never injured the knee in the past.  He has had some swelling. He has use of anti-inflammatories is preliminary treatment. Has no history of a DVT or PE, staph strep or MRSA infection, or cardiac disease.     Past Medical History:   Diagnosis Date    Chronic back pain     GERD (gastroesophageal reflux disease)     Heartburn     HTN (hypertension)      Past Surgical History:   Procedure Laterality Date    APPENDECTOMY      BACK SURGERY      NECK SURGERY      PILONIDAL CYST EXCISION  2005    TONSILLECTOMY      UPPER GASTROINTESTINAL ENDOSCOPY  2008    Hiatal hernia, normal stomach, normal duodenum     Social History     Socioeconomic History    Marital status:      Spouse name: Not on file    Number of children: Not on file    Years of education: Not on file    Highest education level: Not on file   Occupational History    Not on file   Social Needs    Financial resource strain: Not on file    Food insecurity     Worry: Not on file     Inability: Not on file    Transportation needs     Medical: Not on Negative for arthralgias and myalgias. Objective:   Pulse 78   Temp 97.1 °F (36.2 °C) (Temporal)   Ht 6' 2\" (1.88 m)   Wt 290 lb (131.5 kg)   SpO2 98%   BMI 37.23 kg/m²     Physical exam:  Examination today of his left knee he does have a trace to 1+ effusion he points to the medial joint line as a source of his discomfort. He is tender over the medial joint line. Nontender lateral joint line. There is no parapatellar tenderness no popliteal tenderness or masses are noted. Is a negative anterior posterior drawer and there is no medial laxity at 0 30 degrees of flexion Derik's testing does produce medial joint line pain. Strength is 5/5 in flexion extension. DP is 1+. Sensation is intact to fine touch in left knee area. Radiographs and Laboratory Studies:     Diagnostic Imaging Studies:         Complex displaced medial meniscal tear. Severe medial compartment osteoarthritis. I did review his initial x-rays on the epic program they do show mild medial compartment narrowing in the left knee with subtle parapatellar spurring. Joint spaces however are relatively well-maintained. Additionally I did review his MRI of his left knee. Those that MRI does document an obvious tear of the posterior horn and body of the medial meniscus. There is also bone bruising in the medial femoral condyle medial tibial plateau and osteoarthritic changes as well. Laboratory Studies:   Lab Results   Component Value Date    WBC 6.5 01/30/2020    HGB 16.1 01/30/2020    HCT 47.2 01/30/2020    MCV 88.6 01/30/2020     01/30/2020     No results found for: SEDRATE  No results found for: CRP    Assessment/Plan:       Diagnosis Orders   1. Acute tear medial meniscus, left, subsequent encounter     2. Primary osteoarthritis of left knee         Reviewed the patient's diagnoses with him. In my opinion he has a medial meniscal tear. Osteoarthritis left knee.   And bone bruise of the medial femoral condyle medial tibial plateau. The treatment for the bone bruise is rest and healing. Treatment for the osteoarthritis include anti-inflammatories, cortisone injections, joint lubricant injections, physical therapy, bracing, weight loss and ultimately joint replacement. Treatment options for the medial meniscal tear include arthroscopic partial meniscectomy. He does understand there is no way to know to what extent his symptoms are coming from the medial discal tear, the arthritis and the bone bruise and therefore appropriate treatment of meniscal pathology is not a guarantee relief of symptoms because the underlying arthritis would remain. I discussed the treatment options with him he wishes to proceed with an arthroscopy the left knee and a partial medial vasectomy. Was actually a bit concerned because it is a whole 2 weeks until I can get him scheduled. Discussed with him the incision, the use the tourniquet become used to the leg taveras, the procedure and the use of the arthroscope. I discussed with him the postoperative course including full weightbearing as tolerated, just a light Ace wrap dressing and a gradual progression of activity as symptoms and pain allow. I reviewed the risk of the surgery which include infection, bleeding, neurovascular injury, continued pain and discomfort after the surgery, DVT and potentially fatal PE and other potentially fatal and nonfatal complications the surgeon the perioperative. Jadon Lopez He does understand there he must have a general anesthetic and the risk anesthetic which include death, CVA, GI,  complications allergic reactions DVT and potentially fatal PE and other potentially fatal nonfatal complications the surgeon the perioperative. I do consider an endpoint for the surgery to be around 12 weeks or 3 months after the surgery if he has persistent symptoms related to his arthritis we can begin further treatment for that in the future.   He will proceed with surgical scheduling and I will see him in the operating room in the near future. No orders of the defined types were placed in this encounter. No orders of the defined types were placed in this encounter. Return for Follow up visit.       Marietta Tate MD

## 2020-09-09 NOTE — PROGRESS NOTES
file     Gets together: Not on file     Attends Bahai service: Not on file     Active member of club or organization: Not on file     Attends meetings of clubs or organizations: Not on file     Relationship status: Not on file    Intimate partner violence     Fear of current or ex partner: Not on file     Emotionally abused: Not on file     Physically abused: Not on file     Forced sexual activity: Not on file   Other Topics Concern    Not on file   Social History Narrative    Not on file     Family History   Problem Relation Age of Onset    High Blood Pressure Father     Diabetes Father     Crohn's Disease Brother      No Known Allergies  Current Outpatient Medications on File Prior to Visit   Medication Sig Dispense Refill    amLODIPine (NORVASC) 5 MG tablet TAKE 1 TABLET BY MOUTH ONE TIME DAILY 90 tablet 3    celecoxib (CELEBREX) 200 MG capsule TAKE ONE CAPSULE BY MOUTH TWICE A  capsule 2    cyclobenzaprine (FLEXERIL) 10 MG tablet TAKE 1 TABLET BY MOUTH ONE TIME DAILY 90 tablet 3    omeprazole (PRILOSEC) 20 MG delayed release capsule TAKE 1 CAPSULE BY MOUTH ONE TIME DAILY 90 capsule 3    gabapentin (NEURONTIN) 300 MG capsule Take 1 capsule by mouth every evening for 180 days. 90 capsule 3     No current facility-administered medications on file prior to visit. Review of Systems   Constitutional: Negative for fever. HENT: Negative for sore throat. Eyes: Negative for visual disturbance. Respiratory: Negative for shortness of breath. Cardiovascular: Negative for chest pain. Gastrointestinal: Negative for abdominal pain. Genitourinary: Negative for difficulty urinating. Skin: Negative for rash. Neurological: Negative for headaches. Hematological: Does not bruise/bleed easily.          Objective:   Pulse 61   Temp 97.2 °F (36.2 °C) (Temporal)   Ht 6' 2\" (1.88 m)   Wt 290 lb (131.5 kg)   SpO2 97%   BMI 37.23 kg/m²     Ortho Exam  Examination left knee shows a mild swelling. No mediolateral laxity. No anterior posterior laxity is noted. Range of motion is lacking terminal 10 degrees of fixed flexion. He can flex approximately 110. Pain on the medial joint line. No pain on the lateral joint line. No palpable cords posteriorly. No calf tenderness. +2 posterior tibialis pulses noted. Minimal crepitus range of motion. No pain to patellar compression. Negative roll test left hip. Positive Derik medially. Radiographs and Laboratory Studies:     Diagnostic Imaging Studies:    Radiological evaluation consisting of multiple views of the knee shows some degenerative changes present. No acute fractures or dislocations noted. MRI of the knee shows a complex tear of the medial meniscus along with some degenerative changes in the medial joint line. No acute fractures or dislocations noted. No ligamentous injury is noted. Laboratory Studies:   Lab Results   Component Value Date    WBC 6.5 01/30/2020    HGB 16.1 01/30/2020    HCT 47.2 01/30/2020    MCV 88.6 01/30/2020     01/30/2020     No results found for: SEDRATE  No results found for: CRP    Assessment:      Diagnosis Orders   1. Acute medial meniscus tear, left, initial encounter     2. Arthritis of left knee            Plan:     Impression is that it complex medial meniscus tear along with DJD of the left knee. We discussed the meniscal tear along with the arthritic component of the knee and the treatment for those. Due to the fact he has a lack of mobility at this point his main issue is that of a mechanical component from his meniscal tear recommendation would be to proceed with surgical treatment of form of arthroscopy correction of the meniscal pathology and possible debridement of his arthritis. Discussed with the patient he may have some persistent difficulty with pain in the knee from his arthritic component. We showed him in a model what this it would entail.   We answered all his questions to his satisfaction. We will get him scheduled with Dr. Katelynn Nelson for the possibly of arthroscopic treatment of his left knee. In the meantime continue with the Celebrex    No orders of the defined types were placed in this encounter. No orders of the defined types were placed in this encounter. No follow-ups on file.       Morris Zuñiga MD

## 2020-09-10 RX ORDER — GABAPENTIN 300 MG/1
CAPSULE ORAL
Qty: 90 CAPSULE | Refills: 1 | Status: SHIPPED | OUTPATIENT
Start: 2020-09-10 | End: 2021-03-08

## 2020-09-10 NOTE — TELEPHONE ENCOUNTER
lov 8/20/2020    Pt states he is still taking medication, pt states he has actually doubled up dosage due to knee injury.

## 2020-09-14 ENCOUNTER — NURSE ONLY (OUTPATIENT)
Dept: PRIMARY CARE CLINIC | Age: 50
End: 2020-09-14

## 2020-09-14 ENCOUNTER — HOSPITAL ENCOUNTER (OUTPATIENT)
Age: 50
Setting detail: SPECIMEN
Discharge: HOME | End: 2020-09-14
Payer: COMMERCIAL

## 2020-09-14 ENCOUNTER — OFFICE VISIT (OUTPATIENT)
Dept: ORTHOPEDIC SURGERY | Age: 50
End: 2020-09-14
Payer: COMMERCIAL

## 2020-09-14 VITALS
WEIGHT: 290 LBS | DIASTOLIC BLOOD PRESSURE: 76 MMHG | HEART RATE: 69 BPM | HEIGHT: 74 IN | RESPIRATION RATE: 16 BRPM | OXYGEN SATURATION: 99 % | BODY MASS INDEX: 37.22 KG/M2 | TEMPERATURE: 96.7 F | SYSTOLIC BLOOD PRESSURE: 128 MMHG

## 2020-09-14 DIAGNOSIS — Z01.818 PREOP EXAMINATION: ICD-10-CM

## 2020-09-14 LAB
ANION GAP SERPL CALCULATED.3IONS-SCNC: 10 MEQ/L (ref 9–15)
BUN BLDV-MCNC: 17 MG/DL (ref 6–20)
CALCIUM SERPL-MCNC: 8.8 MG/DL (ref 8.5–9.9)
CHLORIDE BLD-SCNC: 104 MEQ/L (ref 95–107)
CO2: 24 MEQ/L (ref 20–31)
CREAT SERPL-MCNC: 0.79 MG/DL (ref 0.7–1.2)
GFR AFRICAN AMERICAN: >60
GFR NON-AFRICAN AMERICAN: >60
GLUCOSE BLD-MCNC: 102 MG/DL (ref 70–99)
HCT VFR BLD CALC: 43.9 % (ref 42–52)
HEMOGLOBIN: 14.9 G/DL (ref 14–18)
MCH RBC QN AUTO: 29.9 PG (ref 27–31.3)
MCHC RBC AUTO-ENTMCNC: 33.8 % (ref 33–37)
MCV RBC AUTO: 88.3 FL (ref 80–100)
PDW BLD-RTO: 13.5 % (ref 11.5–14.5)
PLATELET # BLD: 264 K/UL (ref 130–400)
POTASSIUM SERPL-SCNC: 3.9 MEQ/L (ref 3.4–4.9)
RBC # BLD: 4.98 M/UL (ref 4.7–6.1)
SODIUM BLD-SCNC: 138 MEQ/L (ref 135–144)
WBC # BLD: 6.9 K/UL (ref 4.8–10.8)

## 2020-09-14 PROCEDURE — U0003 INFECTIOUS AGENT DETECTION BY NUCLEIC ACID (DNA OR RNA); SEVERE ACUTE RESPIRATORY SYNDROME CORONAVIRUS 2 (SARS-COV-2) (CORONAVIRUS DISEASE [COVID-19]), AMPLIFIED PROBE TECHNIQUE, MAKING USE OF HIGH THROUGHPUT TECHNOLOGIES AS DESCRIBED BY CMS-2020-01-R: HCPCS

## 2020-09-14 PROCEDURE — 99203 OFFICE O/P NEW LOW 30 MIN: CPT | Performed by: PHYSICIAN ASSISTANT

## 2020-09-14 ASSESSMENT — ENCOUNTER SYMPTOMS
WHEEZING: 0
SHORTNESS OF BREATH: 0
DIARRHEA: 0
BACK PAIN: 0
COLOR CHANGE: 0
CONSTIPATION: 0
VOMITING: 0
STRIDOR: 0
NAUSEA: 0

## 2020-09-14 NOTE — PROGRESS NOTES
[address] [place] [address] and Sports Medicine    H&P: Preadmission Testing     Patient: Deena Mirza  YOB: 1970  MRN: [id number]    Subjective:     Chief Complaint   Patient presents with    H&P     PAT ARTHROSCOPY LEFT KNEE, PARTIAL MEDIAL MENISECTOMY w/ Stimbu 9/23       HPI: Deena Mirza is a 52 y.o. male w/ pertinent PMHx of recently is here for preop evaluation for left knee arthroscopy scheduled for a few Wednesdays from now with Dr. Bari Goldstein    He is an overall healthy individual, no shortness of breath or chest pain. Denies any history of long heart or kidney disease. Not a diabetic. Not a smoker. Denies any recent sick contacts especially to that of COVID, no loss of taste or smell, shortness of breath, chest pain, fever.       Past Medical History:        Diagnosis Date    Chronic back pain     GERD (gastroesophageal reflux disease)     Heartburn     HTN (hypertension)      Past Surgical History:    Past Surgical History:   Procedure Laterality Date    APPENDECTOMY      BACK SURGERY      NECK SURGERY      PILONIDAL CYST EXCISION  2005    TONSILLECTOMY      UPPER GASTROINTESTINAL ENDOSCOPY  2008    Hiatal hernia, normal stomach, normal duodenum       Medications Prior to Admission:    Current Outpatient Medications   Medication Sig Dispense Refill    gabapentin (NEURONTIN) 300 MG capsule TAKE ONE CAPSULE BY MOUTH EVERY EVENING 90 capsule 1    amLODIPine (NORVASC) 5 MG tablet TAKE 1 TABLET BY MOUTH ONE TIME DAILY 90 tablet 3    celecoxib (CELEBREX) 200 MG capsule TAKE ONE CAPSULE BY MOUTH TWICE A DAY (Patient taking differently: Indications: patient takes 3 times a day TAKE ONE CAPSULE BY MOUTH TWICE A DAY) 180 capsule 2    cyclobenzaprine (FLEXERIL) 10 MG tablet TAKE 1 TABLET BY MOUTH ONE TIME DAILY (Patient taking differently: TAKE 1 TABLET BY MOUTH ONE TIME DAILY PRN) 90 tablet 3    omeprazole (PRILOSEC) 20 MG delayed release capsule TAKE 1 CAPSULE BY MOUTH ONE TIME DAILY 90 capsule 3     No current facility-administered medications for this visit. Allergies:    Patient has no known allergies. Social History:   Social History     Socioeconomic History    Marital status:      Spouse name: Not on file    Number of children: Not on file    Years of education: Not on file    Highest education level: Not on file   Occupational History    Not on file   Social Needs    Financial resource strain: Not on file    Food insecurity     Worry: Not on file     Inability: Not on file    Transportation needs     Medical: Not on file     Non-medical: Not on file   Tobacco Use    Smoking status: Current Every Day Smoker     Packs/day: 0.00    Smokeless tobacco: Never Used    Tobacco comment: Vapor   Substance and Sexual Activity    Alcohol use: Yes     Alcohol/week: 0.0 standard drinks     Comment: occasionally    Drug use: No    Sexual activity: Not on file   Lifestyle    Physical activity     Days per week: Not on file     Minutes per session: Not on file    Stress: Not on file   Relationships    Social connections     Talks on phone: Not on file     Gets together: Not on file     Attends Congregational service: Not on file     Active member of club or organization: Not on file     Attends meetings of clubs or organizations: Not on file     Relationship status: Not on file    Intimate partner violence     Fear of current or ex partner: Not on file     Emotionally abused: Not on file     Physically abused: Not on file     Forced sexual activity: Not on file   Other Topics Concern    Not on file   Social History Narrative    Not on file       Family History:       Problem Relation Age of Onset    Crohn's Disease Brother     High Blood Pressure Father     Diabetes Father          Review of Systems   Constitutional: Negative for appetite change and fever. Respiratory: Negative for shortness of breath, wheezing and stridor.     Cardiovascular: Negative for chest pain and palpitations. Gastrointestinal: Negative for constipation, diarrhea, nausea and vomiting. Musculoskeletal: Negative for arthralgias, back pain, joint swelling, myalgias and neck pain. Skin: Negative for color change and rash. Neurological: Negative for dizziness, speech difficulty, weakness and light-headedness. Objective:   /76 (Site: Left Upper Arm, Position: Sitting, Cuff Size: Medium Adult)   Pulse 69   Temp 96.7 °F (35.9 °C) (Temporal)   Resp 16   Ht 6' 2\" (1.88 m)   Wt 290 lb (131.5 kg)   SpO2 99%   BMI 37.23 kg/m²     Physical Exam  Constitutional:       General: He is not in acute distress. Appearance: Normal appearance. He is not ill-appearing. HENT:      Head: Normocephalic. Nose: Nose normal. No congestion or rhinorrhea. Mouth/Throat:      Mouth: Mucous membranes are moist.      Pharynx: Oropharynx is clear. No oropharyngeal exudate or posterior oropharyngeal erythema. Eyes:      Extraocular Movements: Extraocular movements intact. Pupils: Pupils are equal, round, and reactive to light. Cardiovascular:      Rate and Rhythm: Normal rate and regular rhythm. Pulses: Normal pulses. Heart sounds: Normal heart sounds. Pulmonary:      Effort: Pulmonary effort is normal.      Breath sounds: Normal breath sounds. No wheezing, rhonchi or rales. Abdominal:      General: Abdomen is flat. Bowel sounds are normal.      Palpations: Abdomen is soft. Tenderness: There is no abdominal tenderness. Skin:     General: Skin is warm and dry. Capillary Refill: Capillary refill takes less than 2 seconds. Comments: No swelling or erythema over the incision site   Neurological:      General: No focal deficit present. Mental Status: He is alert and oriented to person, place, and time.             Radiographs and Laboratory Studies:     Laboratory Studies:   Lab Results   Component Value Date    WBC 6.5 01/30/2020    HGB 16.1 01/30/2020    HCT 47.2 01/30/2020    MCV 88.6 01/30/2020     01/30/2020     No results found for: SEDRATE  No results found for: CRP    Assessment and Plan:      Diagnosis Orders   1. Preop examination  CBC    Basic Metabolic Panel     Patient was instructed to quarantine themself until the day of surgery after getting the COVID test in the emergency department today. Blood work and EKG was ordered and will be reviewed. Postop pain regimen was reviewed with the patient:   I'll see them back 2 weeks postoperatively.     Mumtza Engle PA-C  [address] and Sports Medicine  [phone]

## 2020-09-14 NOTE — PATIENT INSTRUCTIONS
-please walk over to our lab for your blood testing, located right outside our office.   -after you get your blood work, drive / walk to ER and call [phone] upon arrival and tell them your name and that you need a COVID test for preadmission testing.  -stop taking asprin and motrin until after your surgery  -no eating / drinking the after midnight the night before surgery

## 2020-09-17 LAB
SARS-COV-2: NOT DETECTED
SOURCE: NORMAL

## 2020-09-23 ENCOUNTER — ANESTHESIA (OUTPATIENT)
Dept: OPERATING ROOM | Age: 50
End: 2020-09-23
Payer: COMMERCIAL

## 2020-09-23 ENCOUNTER — ANESTHESIA EVENT (OUTPATIENT)
Dept: OPERATING ROOM | Age: 50
End: 2020-09-23
Payer: COMMERCIAL

## 2020-09-23 ENCOUNTER — HOSPITAL ENCOUNTER (OUTPATIENT)
Age: 50
Setting detail: OUTPATIENT SURGERY
Discharge: HOME | End: 2020-09-23
Attending: ORTHOPAEDIC SURGERY | Admitting: ORTHOPAEDIC SURGERY
Payer: COMMERCIAL

## 2020-09-23 VITALS
OXYGEN SATURATION: 98 % | WEIGHT: 290 LBS | RESPIRATION RATE: 16 BRPM | BODY MASS INDEX: 37.22 KG/M2 | TEMPERATURE: 97.4 F | DIASTOLIC BLOOD PRESSURE: 82 MMHG | SYSTOLIC BLOOD PRESSURE: 159 MMHG | HEART RATE: 68 BPM | HEIGHT: 74 IN

## 2020-09-23 VITALS — TEMPERATURE: 95.2 F | SYSTOLIC BLOOD PRESSURE: 127 MMHG | OXYGEN SATURATION: 98 % | DIASTOLIC BLOOD PRESSURE: 85 MMHG

## 2020-09-23 PROCEDURE — 29881 ARTHRS KNE SRG MNISECTMY M/L: CPT | Performed by: ORTHOPAEDIC SURGERY

## 2020-09-23 PROCEDURE — 3700000000 HC ANESTHESIA ATTENDED CARE: Performed by: ORTHOPAEDIC SURGERY

## 2020-09-23 PROCEDURE — 2580000003 HC RX 258: Performed by: ANESTHESIOLOGY

## 2020-09-23 PROCEDURE — 7100000010 HC PHASE II RECOVERY - FIRST 15 MIN: Performed by: ORTHOPAEDIC SURGERY

## 2020-09-23 PROCEDURE — 3600000004 HC SURGERY LEVEL 4 BASE: Performed by: ORTHOPAEDIC SURGERY

## 2020-09-23 PROCEDURE — 3600000014 HC SURGERY LEVEL 4 ADDTL 15MIN: Performed by: ORTHOPAEDIC SURGERY

## 2020-09-23 PROCEDURE — 2580000003 HC RX 258: Performed by: ORTHOPAEDIC SURGERY

## 2020-09-23 PROCEDURE — 7100000000 HC PACU RECOVERY - FIRST 15 MIN: Performed by: ORTHOPAEDIC SURGERY

## 2020-09-23 PROCEDURE — 6360000002 HC RX W HCPCS: Performed by: NURSE ANESTHETIST, CERTIFIED REGISTERED

## 2020-09-23 PROCEDURE — 2500000003 HC RX 250 WO HCPCS: Performed by: ORTHOPAEDIC SURGERY

## 2020-09-23 PROCEDURE — 3700000001 HC ADD 15 MINUTES (ANESTHESIA): Performed by: ORTHOPAEDIC SURGERY

## 2020-09-23 PROCEDURE — 6360000002 HC RX W HCPCS: Performed by: ANESTHESIOLOGY

## 2020-09-23 PROCEDURE — 7100000001 HC PACU RECOVERY - ADDTL 15 MIN: Performed by: ORTHOPAEDIC SURGERY

## 2020-09-23 PROCEDURE — 29881 ARTHRS KNE SRG MNISECTMY M/L: CPT | Performed by: PHYSICIAN ASSISTANT

## 2020-09-23 PROCEDURE — 6370000000 HC RX 637 (ALT 250 FOR IP): Performed by: ANESTHESIOLOGY

## 2020-09-23 PROCEDURE — 2709999900 HC NON-CHARGEABLE SUPPLY: Performed by: ORTHOPAEDIC SURGERY

## 2020-09-23 PROCEDURE — 7100000011 HC PHASE II RECOVERY - ADDTL 15 MIN: Performed by: ORTHOPAEDIC SURGERY

## 2020-09-23 PROCEDURE — 6360000002 HC RX W HCPCS: Performed by: ORTHOPAEDIC SURGERY

## 2020-09-23 RX ORDER — BUPIVACAINE HYDROCHLORIDE 2.5 MG/ML
INJECTION, SOLUTION EPIDURAL; INFILTRATION; INTRACAUDAL PRN
Status: DISCONTINUED | OUTPATIENT
Start: 2020-09-23 | End: 2020-09-23 | Stop reason: ALTCHOICE

## 2020-09-23 RX ORDER — ONDANSETRON 2 MG/ML
4 INJECTION INTRAMUSCULAR; INTRAVENOUS EVERY 6 HOURS PRN
Status: DISCONTINUED | OUTPATIENT
Start: 2020-09-23 | End: 2020-09-23 | Stop reason: HOSPADM

## 2020-09-23 RX ORDER — OXYCODONE HYDROCHLORIDE 5 MG/1
5 TABLET ORAL EVERY 4 HOURS PRN
Status: DISCONTINUED | OUTPATIENT
Start: 2020-09-23 | End: 2020-09-23 | Stop reason: HOSPADM

## 2020-09-23 RX ORDER — SODIUM CHLORIDE, SODIUM LACTATE, POTASSIUM CHLORIDE, CALCIUM CHLORIDE 600; 310; 30; 20 MG/100ML; MG/100ML; MG/100ML; MG/100ML
INJECTION, SOLUTION INTRAVENOUS CONTINUOUS
Status: DISCONTINUED | OUTPATIENT
Start: 2020-09-23 | End: 2020-09-23 | Stop reason: HOSPADM

## 2020-09-23 RX ORDER — HYDROCODONE BITARTRATE AND ACETAMINOPHEN 5; 325 MG/1; MG/1
1 TABLET ORAL PRN
Status: COMPLETED | OUTPATIENT
Start: 2020-09-23 | End: 2020-09-23

## 2020-09-23 RX ORDER — MAGNESIUM HYDROXIDE 1200 MG/15ML
LIQUID ORAL CONTINUOUS PRN
Status: COMPLETED | OUTPATIENT
Start: 2020-09-23 | End: 2020-09-23

## 2020-09-23 RX ORDER — DEXAMETHASONE SODIUM PHOSPHATE 10 MG/ML
INJECTION INTRAMUSCULAR; INTRAVENOUS PRN
Status: DISCONTINUED | OUTPATIENT
Start: 2020-09-23 | End: 2020-09-23 | Stop reason: SDUPTHER

## 2020-09-23 RX ORDER — HYDROCODONE BITARTRATE AND ACETAMINOPHEN 5; 325 MG/1; MG/1
2 TABLET ORAL PRN
Status: COMPLETED | OUTPATIENT
Start: 2020-09-23 | End: 2020-09-23

## 2020-09-23 RX ORDER — ONDANSETRON 2 MG/ML
4 INJECTION INTRAMUSCULAR; INTRAVENOUS
Status: DISCONTINUED | OUTPATIENT
Start: 2020-09-23 | End: 2020-09-23 | Stop reason: HOSPADM

## 2020-09-23 RX ORDER — MEPERIDINE HYDROCHLORIDE 25 MG/ML
12.5 INJECTION INTRAMUSCULAR; INTRAVENOUS; SUBCUTANEOUS EVERY 5 MIN PRN
Status: DISCONTINUED | OUTPATIENT
Start: 2020-09-23 | End: 2020-09-23 | Stop reason: HOSPADM

## 2020-09-23 RX ORDER — FENTANYL CITRATE 50 UG/ML
50 INJECTION, SOLUTION INTRAMUSCULAR; INTRAVENOUS EVERY 10 MIN PRN
Status: DISCONTINUED | OUTPATIENT
Start: 2020-09-23 | End: 2020-09-23 | Stop reason: HOSPADM

## 2020-09-23 RX ORDER — ONDANSETRON 2 MG/ML
INJECTION INTRAMUSCULAR; INTRAVENOUS PRN
Status: DISCONTINUED | OUTPATIENT
Start: 2020-09-23 | End: 2020-09-23 | Stop reason: SDUPTHER

## 2020-09-23 RX ORDER — OXYCODONE HYDROCHLORIDE 5 MG/1
5 TABLET ORAL EVERY 6 HOURS PRN
Qty: 6 TABLET | Refills: 0 | Status: SHIPPED | OUTPATIENT
Start: 2020-09-23 | End: 2020-09-26

## 2020-09-23 RX ORDER — ACETAMINOPHEN 325 MG/1
650 TABLET ORAL EVERY 4 HOURS PRN
Status: DISCONTINUED | OUTPATIENT
Start: 2020-09-23 | End: 2020-09-23 | Stop reason: HOSPADM

## 2020-09-23 RX ORDER — FENTANYL CITRATE 50 UG/ML
INJECTION, SOLUTION INTRAMUSCULAR; INTRAVENOUS PRN
Status: DISCONTINUED | OUTPATIENT
Start: 2020-09-23 | End: 2020-09-23 | Stop reason: SDUPTHER

## 2020-09-23 RX ORDER — MIDAZOLAM HYDROCHLORIDE 1 MG/ML
INJECTION INTRAMUSCULAR; INTRAVENOUS PRN
Status: DISCONTINUED | OUTPATIENT
Start: 2020-09-23 | End: 2020-09-23 | Stop reason: SDUPTHER

## 2020-09-23 RX ORDER — ONDANSETRON 4 MG/1
4 TABLET, ORALLY DISINTEGRATING ORAL EVERY 8 HOURS PRN
Status: DISCONTINUED | OUTPATIENT
Start: 2020-09-23 | End: 2020-09-23 | Stop reason: HOSPADM

## 2020-09-23 RX ORDER — PROPOFOL 10 MG/ML
INJECTION, EMULSION INTRAVENOUS PRN
Status: DISCONTINUED | OUTPATIENT
Start: 2020-09-23 | End: 2020-09-23 | Stop reason: SDUPTHER

## 2020-09-23 RX ORDER — LIDOCAINE HYDROCHLORIDE 20 MG/ML
INJECTION, SOLUTION INTRAVENOUS PRN
Status: DISCONTINUED | OUTPATIENT
Start: 2020-09-23 | End: 2020-09-23 | Stop reason: SDUPTHER

## 2020-09-23 RX ORDER — DIPHENHYDRAMINE HYDROCHLORIDE 50 MG/ML
12.5 INJECTION INTRAMUSCULAR; INTRAVENOUS
Status: DISCONTINUED | OUTPATIENT
Start: 2020-09-23 | End: 2020-09-23 | Stop reason: HOSPADM

## 2020-09-23 RX ORDER — METOCLOPRAMIDE HYDROCHLORIDE 5 MG/ML
10 INJECTION INTRAMUSCULAR; INTRAVENOUS
Status: DISCONTINUED | OUTPATIENT
Start: 2020-09-23 | End: 2020-09-23 | Stop reason: HOSPADM

## 2020-09-23 RX ADMIN — FENTANYL CITRATE 50 MCG: 0.05 INJECTION, SOLUTION INTRAMUSCULAR; INTRAVENOUS at 09:17

## 2020-09-23 RX ADMIN — PROPOFOL 200 MG: 10 INJECTION, EMULSION INTRAVENOUS at 07:40

## 2020-09-23 RX ADMIN — MIDAZOLAM HYDROCHLORIDE 2 MG: 2 INJECTION, SOLUTION INTRAMUSCULAR; INTRAVENOUS at 07:36

## 2020-09-23 RX ADMIN — HYDROCODONE BITARTRATE AND ACETAMINOPHEN 2 TABLET: 5; 325 TABLET ORAL at 10:10

## 2020-09-23 RX ADMIN — FENTANYL CITRATE 50 MCG: 0.05 INJECTION, SOLUTION INTRAMUSCULAR; INTRAVENOUS at 09:29

## 2020-09-23 RX ADMIN — FENTANYL CITRATE 50 MCG: 0.05 INJECTION, SOLUTION INTRAMUSCULAR; INTRAVENOUS at 09:06

## 2020-09-23 RX ADMIN — FENTANYL CITRATE 50 MCG: 50 INJECTION, SOLUTION INTRAMUSCULAR; INTRAVENOUS at 07:40

## 2020-09-23 RX ADMIN — FENTANYL CITRATE 25 MCG: 50 INJECTION, SOLUTION INTRAMUSCULAR; INTRAVENOUS at 08:08

## 2020-09-23 RX ADMIN — FENTANYL CITRATE 25 MCG: 50 INJECTION, SOLUTION INTRAMUSCULAR; INTRAVENOUS at 08:20

## 2020-09-23 RX ADMIN — LIDOCAINE HYDROCHLORIDE 50 MG: 20 INJECTION, SOLUTION INTRAVENOUS at 07:40

## 2020-09-23 RX ADMIN — DEXAMETHASONE SODIUM PHOSPHATE 8 MG: 10 INJECTION INTRAMUSCULAR; INTRAVENOUS at 07:40

## 2020-09-23 RX ADMIN — ONDANSETRON 4 MG: 2 INJECTION INTRAMUSCULAR; INTRAVENOUS at 07:40

## 2020-09-23 RX ADMIN — SODIUM CHLORIDE, POTASSIUM CHLORIDE, SODIUM LACTATE AND CALCIUM CHLORIDE 1000 ML: 600; 310; 30; 20 INJECTION, SOLUTION INTRAVENOUS at 06:39

## 2020-09-23 RX ADMIN — CEFAZOLIN 3 G: 10 INJECTION, POWDER, FOR SOLUTION INTRAVENOUS at 07:43

## 2020-09-23 RX ADMIN — SODIUM CHLORIDE, POTASSIUM CHLORIDE, SODIUM LACTATE AND CALCIUM CHLORIDE: 600; 310; 30; 20 INJECTION, SOLUTION INTRAVENOUS at 08:35

## 2020-09-23 ASSESSMENT — PULMONARY FUNCTION TESTS
PIF_VALUE: 25
PIF_VALUE: 16
PIF_VALUE: 15
PIF_VALUE: 1
PIF_VALUE: 16
PIF_VALUE: 17
PIF_VALUE: 16
PIF_VALUE: 15
PIF_VALUE: 16
PIF_VALUE: 16
PIF_VALUE: 15
PIF_VALUE: 17
PIF_VALUE: 1
PIF_VALUE: 15
PIF_VALUE: 17
PIF_VALUE: 16
PIF_VALUE: 3
PIF_VALUE: 16
PIF_VALUE: 2
PIF_VALUE: 0
PIF_VALUE: 15
PIF_VALUE: 11
PIF_VALUE: 17
PIF_VALUE: 16
PIF_VALUE: 0
PIF_VALUE: 15
PIF_VALUE: 16
PIF_VALUE: 1
PIF_VALUE: 15
PIF_VALUE: 1
PIF_VALUE: 15
PIF_VALUE: 16
PIF_VALUE: 17
PIF_VALUE: 17
PIF_VALUE: 3
PIF_VALUE: 14
PIF_VALUE: 16
PIF_VALUE: 17
PIF_VALUE: 16
PIF_VALUE: 17
PIF_VALUE: 11
PIF_VALUE: 13
PIF_VALUE: 16
PIF_VALUE: 16
PIF_VALUE: 1
PIF_VALUE: 16
PIF_VALUE: 15
PIF_VALUE: 15
PIF_VALUE: 16
PIF_VALUE: 15
PIF_VALUE: 15
PIF_VALUE: 16
PIF_VALUE: 15
PIF_VALUE: 16
PIF_VALUE: 16
PIF_VALUE: 18
PIF_VALUE: 17
PIF_VALUE: 16
PIF_VALUE: 15
PIF_VALUE: 16
PIF_VALUE: 16
PIF_VALUE: 0
PIF_VALUE: 15
PIF_VALUE: 15
PIF_VALUE: 0

## 2020-09-23 ASSESSMENT — PAIN SCALES - GENERAL
PAINLEVEL_OUTOF10: 6
PAINLEVEL_OUTOF10: 4
PAINLEVEL_OUTOF10: 6
PAINLEVEL_OUTOF10: 7
PAINLEVEL_OUTOF10: 4
PAINLEVEL_OUTOF10: 5
PAINLEVEL_OUTOF10: 6

## 2020-09-23 NOTE — ANESTHESIA PRE PROCEDURE
Department of Anesthesiology  Preprocedure Note       Name:  Georges Ennis   Age:  52 y.o.  :  1970                                          MRN:  [id number]         Date:  2020      Surgeon: Kojo Olivares):  Brayan Nichols MD    Procedure: Procedure(s):  ARTHROSCOPY LEFT KNEE, PARTIAL MEDIAL MENISECTOMY. PAT WITH UDAY IN OFFICE. REQUESTS 7:30AM ([facility])    Medications prior to admission:   Prior to Admission medications    Medication Sig Start Date End Date Taking?  Authorizing Provider   gabapentin (NEURONTIN) 300 MG capsule TAKE ONE CAPSULE BY MOUTH EVERY EVENING 9/10/20 3/9/21 Yes Lianet Gee MD   amLODIPine (NORVASC) 5 MG tablet TAKE 1 TABLET BY MOUTH ONE TIME DAILY 20  Yes Lianet Gee MD   celecoxib (CELEBREX) 200 MG capsule TAKE ONE CAPSULE BY MOUTH TWICE A DAY  Patient taking differently: Indications: patient takes 3 times a day TAKE ONE CAPSULE BY MOUTH TWICE A DAY 20  Yes Lianet Gee MD   cyclobenzaprine (FLEXERIL) 10 MG tablet TAKE 1 TABLET BY MOUTH ONE TIME DAILY  Patient taking differently: TAKE 1 TABLET BY MOUTH ONE TIME DAILY PRN 20  Yes Lianet Gee MD   omeprazole (PRILOSEC) 20 MG delayed release capsule TAKE 1 CAPSULE BY MOUTH ONE TIME DAILY 20  Yes Lianet Gee MD       Current medications:    Current Facility-Administered Medications   Medication Dose Route Frequency Provider Last Rate Last Dose    ceFAZolin (ANCEF) 3 g in dextrose 5 % 100 mL IVPB  3 g Intravenous On Call to OR Brayan Nichols MD        lactated ringers infusion   Intravenous Continuous Deedee Doe  mL/hr at 20 0639 1,000 mL at 20 4029       Allergies:  No Known Allergies    Problem List:    Patient Active Problem List   Diagnosis Code    Chronic back pain M54.9, G89.29    Hypertension I10    GERD (gastroesophageal reflux disease) K21.9    Obesity E66.9    Acute medial meniscus tear, left, initial encounter J03.179A    Arthritis of left knee M17.12    Preop examination Z01.818       Past Medical History:        Diagnosis Date    Chronic back pain     GERD (gastroesophageal reflux disease)     Heartburn     HTN (hypertension)        Past Surgical History:        Procedure Laterality Date    APPENDECTOMY      BACK SURGERY      NECK SURGERY      PILONIDAL CYST EXCISION  2005    TONSILLECTOMY      UPPER GASTROINTESTINAL ENDOSCOPY  2008    Hiatal hernia, normal stomach, normal duodenum       Social History:    Social History     Tobacco Use    Smoking status: Current Every Day Smoker     Packs/day: 0.00    Smokeless tobacco: Never Used    Tobacco comment: Vapor   Substance Use Topics    Alcohol use: Yes     Alcohol/week: 0.0 standard drinks     Comment: occasionally                                Ready to quit: Not Answered  Counseling given: Not Answered  Comment: Vapor      Vital Signs (Current):   Vitals:    09/23/20 0609 09/23/20 0615   BP: 130/85    Pulse: 60    Resp: 20    Temp: 97.3 °F (36.3 °C)    TempSrc: Temporal    SpO2: 100%    Weight:  290 lb (131.5 kg)   Height:  6' 2\" (1.88 m)                                              BP Readings from Last 3 Encounters:   09/23/20 130/85   09/14/20 128/76   08/20/20 118/72       NPO Status: Time of last liquid consumption: 0000                        Time of last solid consumption: 0000                        Date of last liquid consumption: 09/23/20                        Date of last solid food consumption: 09/23/20    BMI:   Wt Readings from Last 3 Encounters:   09/23/20 290 lb (131.5 kg)   09/14/20 290 lb (131.5 kg)   09/09/20 290 lb (131.5 kg)     Body mass index is 37.23 kg/m².     CBC:   Lab Results   Component Value Date    WBC 6.9 09/14/2020    RBC 4.98 09/14/2020    HGB 14.9 09/14/2020    HCT 43.9 09/14/2020    MCV 88.3 09/14/2020    RDW 13.5 09/14/2020     09/14/2020       CMP:   Lab Results   Component Value Date     09/14/2020    K 3.9 09/14/2020     09/14/2020    CO2 24 09/14/2020    BUN 17 09/14/2020    CREATININE 0.79 09/14/2020    GFRAA >60.0 09/14/2020    LABGLOM >60.0 09/14/2020    GLUCOSE 102 09/14/2020    GLUCOSE 84 04/23/2012    PROT 7.7 01/30/2020    CALCIUM 8.8 09/14/2020    BILITOT 1.1 01/30/2020    ALKPHOS 57 01/30/2020    AST 26 01/30/2020    ALT 44 01/30/2020       POC Tests: No results for input(s): POCGLU, POCNA, POCK, POCCL, POCBUN, POCHEMO, POCHCT in the last 72 hours. Coags:   Lab Results   Component Value Date    PROTIME 9.4 06/27/2014    INR 0.9 06/27/2014    APTT 26.7 06/27/2014       HCG (If Applicable): No results found for: PREGTESTUR, PREGSERUM, HCG, HCGQUANT     ABGs: No results found for: PHART, PO2ART, IYB4JTT, OTD8HVC, BEART, W9IIOQRV     Type & Screen (If Applicable):  No results found for: LABABO, LABRH    Drug/Infectious Status (If Applicable):  No results found for: HIV, HEPCAB    COVID-19 Screening (If Applicable):   Lab Results   Component Value Date    COVID19 Not Detected 09/14/2020         Anesthesia Evaluation    Airway: Mallampati: II  TM distance: >3 FB   Neck ROM: full  Mouth opening: > = 3 FB Dental: normal exam         Pulmonary: breath sounds clear to auscultation                            ROS comment: Smoke Vape   Cardiovascular:    (+) hypertension:,         Rhythm: regular             Beta Blocker:  Not on Beta Blocker         Neuro/Psych:   Negative Neuro/Psych ROS              GI/Hepatic/Renal:   (+) GERD:, morbid obesity          Endo/Other: Negative Endo/Other ROS                    Abdominal:           Vascular: negative vascular ROS. Anesthesia Plan      general     ASA 2     (LMA)  Induction: intravenous. MIPS: Prophylactic antiemetics administered. Anesthetic plan and risks discussed with patient. Plan discussed with CRNA.     Attending anesthesiologist reviewed and agrees with Pre Eval content              Marjorie Rodriguez MD   9/23/2020

## 2020-09-23 NOTE — PROGRESS NOTES
Going home instructions with one prescription given to pt with good understanding by Dawit Webster RN. Ice pack maintained to right knee.

## 2020-09-23 NOTE — ANESTHESIA POSTPROCEDURE EVALUATION
Department of Anesthesiology  Postprocedure Note    Patient: Stanley Avila  MRN: [id number]  YOB: 1970  Date of evaluation: 9/23/2020  Time:  8:57 AM     Procedure Summary     Date:  09/23/20 Room / Location:  [address]    Anesthesia Start:  0395 Anesthesia Stop:  4084    Procedure:  ARTHROSCOPY LEFT KNEE, PARTIAL MEDIAL MENISECTOMY (Left ) Diagnosis:  (LEFT KNEE: MEDIAL MENISCAL TEAR)    Surgeon:  Marietta Tate MD Responsible Provider:  JB Rutherford CRNA    Anesthesia Type:  general ASA Status:  2          Anesthesia Type: general    Seymour Phase I: Seymour Score: 10    Seymour Phase II:      Last vitals: Reviewed and per EMR flowsheets.        Anesthesia Post Evaluation    Patient location during evaluation: bedside  Patient participation: complete - patient participated  Level of consciousness: awake and awake and alert  Pain score: 0  Airway patency: patent  Nausea & Vomiting: no nausea and no vomiting  Complications: no  Cardiovascular status: blood pressure returned to baseline and hemodynamically stable  Respiratory status: acceptable  Hydration status: euvolemic

## 2020-09-23 NOTE — PROGRESS NOTES
To room 19 accompanied by Scott Diana RN. Head of bed elevated for comfort. Taking drink and snack well. States discomfort improving.

## 2020-09-23 NOTE — OP NOTE
Operative Note      Patient: Josselin Cuevas  YOB: 1970  MRN: [id number]    Date of Procedure: 9/23/2020    Pre-Op Diagnosis: LEFT KNEE: MEDIAL MENISCAL TEAR    Post-Op Diagnosis: Medial meniscal tear and osteoarthritis left knee       Procedure(s):  ARTHROSCOPY LEFT KNEE, PARTIAL MEDIAL MENISECTOMY    Surgeon(s):  Aide Choudhury MD    Assistant:   Physician Assistant: Samuel Perez PA-C    Anesthesia: General    Estimated Blood Loss (mL): Minimal    Complications: None    Specimens: Partial medial meniscectomy  * No specimens in log *    Implants:  * No implants in log *      Drains: * No LDAs found *    Findings: Medial meniscal tear and localized osteoarthritis in the medial compartment of the left knee with exposed bone in the medial femoral condyle    Detailed Description of Procedure:   Preoperative diagnosis: Medial meniscal tear and osteoarthritis left knee. Postoperative diagnosis: Same    Procedure: Arthroscopy left knee partial medial vasectomy    Preoperative note: The patient is a 59-year-old male with a recent history of increased left knee pain of sustaining an injury to his left knee. He had intermittent popping and catching in the knee. He was seen prior to seeing me by 1 of my partners Dr. Art Bertrand after he had an MRI which demonstrated a tear of the medial meniscus. Patient had symptoms consistent with that diagnosis. He also had some early osteoarthritic changes in the left knee. Nonsurgical and surgical options were discussed with the patient in regards to the medial meniscal tear and the osteoarthritis. He did understand that appropriate treatment is meniscal pathology with arthroscopic knee surgery not be a guarantee relief of symptoms because the underlying arthritis would remain. Patient felt strongly that his current symptoms are likely related to his medial meniscal tear and wished to proceed with surgical intervention.   Prior to the procedure he had a history and physical.  9 days ago he had a cover test that was negative and isolated himself and the self isolated manner until then including not going to work. He denied any new COVID's-like symptoms at the present time. Prior to the procedure I did see him in the holding area all final questions were answered. Prior to the procedure he had a full informed consent discussion in the office which included a discussion of the procedure, the risk output, complications, and potential outcomes. His left leg was marked with his informed consent in the holding area is the correct operative site. He did receive 3 g of Ancef intravenously immediately preoperative as his antibiotic prophylaxis    Operative note: Patient is brought in the operating room and placed on the table in a supine position. General anesthesia was induced without complication. Internal is applied to the left upper thigh that was carefully padded. The left leg taveras was then applied to the lateral aspect of the table right up against the tourniquet. His right leg was placed in a well-padded leg taveras as well. The table was flexed to take the pressure of the femoral nerve and back. This point we instituted a pause in the room confirming her during the left knee using the operative consent, the fact that his left leg was marked with my initials, and I had his MRI report in my hand for the patient which included a finding of a medial meniscal tear of the left knee. The left leg was then prepped and draped in a sterile manner. It was exsanguinated aspect Shayan bandage and the tourniquet was inflated to 275 mmHg because of the size of his thigh. Later was inflated inflated to approximately 275 mmHg due to some mild intra-articular bleeding. A standard superior medial inflow cannula was placed followed by anteromedial lateral parapatellar tendon arthroscopy portals.   The arthroscope was placed in the lateral parapatellar tendon portal in the chondral lesions. The lateral gutter was followed back to the popliteal-itis and of the suprapatellar pouch without finding any other pathoanatomy pathoanatomy. We returned the medial compartment where suction ballottement did not reveal any meniscal resection debris. Everything was irrigated through and through with saline solution. Stent 10 cc of quarter percent Marcaine without epinephrine was infused along the incision lines and they were closed with subcuticular 4 Monocryl suture. Skin glue was applied followed by a sterile dressing and the patient was transferred recovery in stable condition. There are no complications. Blood loss was minimal.  Specimen was medial meniscectomy specimen. First assistant this case, Alejandro Espinoza, I will position the patient on the table. He applied the tourniquet. I assisted him in applying both leg holders. He prepped the patient. He held the  leg during the procedure. He closed the incisions and injected the Marcaine. He applied a sterile dressing. He checked the pulse. He helped transport the patient to recovery room.       Electronically signed by Laurence Carpenter MD on 9/23/2020 at 8:35 AM

## 2020-10-14 PROBLEM — Z01.818 PREOP EXAMINATION: Status: RESOLVED | Noted: 2020-09-14 | Resolved: 2020-10-14

## 2020-12-07 RX ORDER — CELECOXIB 200 MG/1
CAPSULE ORAL
Qty: 180 CAPSULE | Refills: 2 | Status: SHIPPED | OUTPATIENT
Start: 2020-12-07 | End: 2020-12-10 | Stop reason: SDUPTHER

## 2020-12-07 NOTE — TELEPHONE ENCOUNTER
lov 1/30/2020  How often should pt be seen? Looks like 6/10/2019 was annual  But 1/30/2020 appt went over chronic conditions.

## 2020-12-10 RX ORDER — CELECOXIB 200 MG/1
200 CAPSULE ORAL DAILY
Qty: 90 CAPSULE | Refills: 2 | Status: SHIPPED | OUTPATIENT
Start: 2020-12-10 | End: 2021-08-03

## 2020-12-10 NOTE — TELEPHONE ENCOUNTER
[facility] health pharm calling needing clarifications on directions?    How does pt take the celebrex and would need quantity updated in new Rx

## 2021-03-07 DIAGNOSIS — K21.9 GASTROESOPHAGEAL REFLUX DISEASE: ICD-10-CM

## 2021-03-07 DIAGNOSIS — G89.29 CHRONIC LOW BACK PAIN: ICD-10-CM

## 2021-03-07 DIAGNOSIS — M54.50 CHRONIC LOW BACK PAIN: ICD-10-CM

## 2021-03-07 DIAGNOSIS — I10 ESSENTIAL HYPERTENSION: ICD-10-CM

## 2021-03-08 RX ORDER — OMEPRAZOLE 20 MG/1
CAPSULE, DELAYED RELEASE ORAL
Qty: 90 CAPSULE | Refills: 3 | Status: SHIPPED | OUTPATIENT
Start: 2021-03-08 | End: 2022-02-24

## 2021-03-08 RX ORDER — GABAPENTIN 300 MG/1
CAPSULE ORAL
Qty: 90 CAPSULE | Refills: 1 | Status: SHIPPED | OUTPATIENT
Start: 2021-03-08 | End: 2021-08-03

## 2021-03-08 RX ORDER — AMLODIPINE BESYLATE 5 MG/1
TABLET ORAL
Qty: 90 TABLET | Refills: 3 | Status: SHIPPED | OUTPATIENT
Start: 2021-03-08 | End: 2022-02-24

## 2021-03-15 ENCOUNTER — OFFICE VISIT (OUTPATIENT)
Dept: FAMILY MEDICINE CLINIC | Age: 51
End: 2021-03-15
Payer: COMMERCIAL

## 2021-03-15 VITALS
SYSTOLIC BLOOD PRESSURE: 130 MMHG | HEIGHT: 74 IN | TEMPERATURE: 98.6 F | DIASTOLIC BLOOD PRESSURE: 74 MMHG | OXYGEN SATURATION: 98 % | WEIGHT: 297 LBS | HEART RATE: 73 BPM | BODY MASS INDEX: 38.12 KG/M2

## 2021-03-15 DIAGNOSIS — G89.29 CHRONIC LOW BACK PAIN, UNSPECIFIED BACK PAIN LATERALITY, UNSPECIFIED WHETHER SCIATICA PRESENT: ICD-10-CM

## 2021-03-15 DIAGNOSIS — Z12.11 SCREEN FOR COLON CANCER: ICD-10-CM

## 2021-03-15 DIAGNOSIS — Z12.5 SCREENING PSA (PROSTATE SPECIFIC ANTIGEN): ICD-10-CM

## 2021-03-15 DIAGNOSIS — K21.9 GASTROESOPHAGEAL REFLUX DISEASE, UNSPECIFIED WHETHER ESOPHAGITIS PRESENT: ICD-10-CM

## 2021-03-15 DIAGNOSIS — M54.50 CHRONIC LOW BACK PAIN, UNSPECIFIED BACK PAIN LATERALITY, UNSPECIFIED WHETHER SCIATICA PRESENT: ICD-10-CM

## 2021-03-15 DIAGNOSIS — I10 ESSENTIAL HYPERTENSION: ICD-10-CM

## 2021-03-15 DIAGNOSIS — Z00.00 PREVENTATIVE HEALTH CARE: Primary | ICD-10-CM

## 2021-03-15 PROCEDURE — 99396 PREV VISIT EST AGE 40-64: CPT | Performed by: FAMILY MEDICINE

## 2021-03-15 SDOH — ECONOMIC STABILITY: INCOME INSECURITY: HOW HARD IS IT FOR YOU TO PAY FOR THE VERY BASICS LIKE FOOD, HOUSING, MEDICAL CARE, AND HEATING?: NOT HARD AT ALL

## 2021-03-15 SDOH — ECONOMIC STABILITY: TRANSPORTATION INSECURITY
IN THE PAST 12 MONTHS, HAS THE LACK OF TRANSPORTATION KEPT YOU FROM MEDICAL APPOINTMENTS OR FROM GETTING MEDICATIONS?: NO

## 2021-03-15 SDOH — ECONOMIC STABILITY: TRANSPORTATION INSECURITY
IN THE PAST 12 MONTHS, HAS LACK OF TRANSPORTATION KEPT YOU FROM MEETINGS, WORK, OR FROM GETTING THINGS NEEDED FOR DAILY LIVING?: NO

## 2021-03-15 ASSESSMENT — PATIENT HEALTH QUESTIONNAIRE - PHQ9
SUM OF ALL RESPONSES TO PHQ QUESTIONS 1-9: 0
1. LITTLE INTEREST OR PLEASURE IN DOING THINGS: 0
SUM OF ALL RESPONSES TO PHQ QUESTIONS 1-9: 0
SUM OF ALL RESPONSES TO PHQ QUESTIONS 1-9: 0

## 2021-03-15 NOTE — PROGRESS NOTES
Subjective:      Chief Complaint   Patient presents with    Annual Exam     check up        Latoya Gaytan is a 48 y.o. male     Here for checkup  Needs Annual labs    Active     Does use e-cig    Back pain from moving  Feeling better  Chronic back issues     celebrex and flexeril ok    Chronic issues under control with current regimen   See ROS     No concerns with meds  Compliant with therapy  Trying to be active    Wt Readings from Last 3 Encounters:   03/15/21 297 lb (134.7 kg)   09/23/20 290 lb (131.5 kg)   09/14/20 290 lb (131.5 kg)         Past Medical History:   Diagnosis Date    Chronic back pain     GERD (gastroesophageal reflux disease)     Heartburn     HTN (hypertension)      Past Surgical History:   Procedure Laterality Date    APPENDECTOMY      BACK SURGERY      KNEE ARTHROSCOPY Left 9/23/2020    ARTHROSCOPY LEFT KNEE, PARTIAL MEDIAL MENISECTOMY performed by Eden Davalos MD at [address] [place] [address] CYST EXCISION  2005    TONSILLECTOMY      UPPER GASTROINTESTINAL ENDOSCOPY  2008    Hiatal hernia, normal stomach, normal duodenum     Family History   Problem Relation Age of Onset    Crohn's Disease Brother     High Blood Pressure Father     Diabetes Father      Social History     Socioeconomic History    Marital status:      Spouse name: None    Number of children: None    Years of education: None    Highest education level: None   Occupational History    None   Social Needs    Financial resource strain: Not hard at all   [place] insecurity     Worry: Never true     Inability: Never true    Transportation needs     Medical: No     Non-medical: No   Tobacco Use    Smoking status: Current Every Day Smoker     Packs/day: 0.00    Smokeless tobacco: Never Used    Tobacco comment: Vapor   Substance and Sexual Activity    Alcohol use:  Yes     Alcohol/week: 0.0 standard drinks     Comment: occasionally    Drug use: No    Sexual activity: None   Lifestyle  Physical activity     Days per week: None     Minutes per session: None    Stress: None   Relationships    Social connections     Talks on phone: None     Gets together: None     Attends Latter-day service: None     Active member of club or organization: None     Attends meetings of clubs or organizations: None     Relationship status: None    Intimate partner violence     Fear of current or ex partner: None     Emotionally abused: None     Physically abused: None     Forced sexual activity: None   Other Topics Concern    None   Social History Narrative    None     Current Outpatient Medications   Medication Sig Dispense Refill    amLODIPine (NORVASC) 5 MG tablet TAKE 1 TABLET BY MOUTH ONE TIME A DAY 90 tablet 3    omeprazole (PRILOSEC) 20 MG delayed release capsule TAKE 1 CAPSULE BY MOUTH ONE TIME A DAY 90 capsule 3    gabapentin (NEURONTIN) 300 MG capsule TAKE ONE CAPSULE BY MOUTH EVERY EVENING 90 capsule 1    celecoxib (CELEBREX) 200 MG capsule Take 1 capsule by mouth daily TAKE ONE CAPSULE BY MOUTH TWICE A DAY 90 capsule 2    cyclobenzaprine (FLEXERIL) 10 MG tablet TAKE 1 TABLET BY MOUTH ONE TIME DAILY (Patient taking differently: TAKE 1 TABLET BY MOUTH ONE TIME DAILY PRN) 90 tablet 3     No current facility-administered medications for this visit.       No Known Allergies      Review of Systems:   General ROS: negative for - nausea, vomiting, chills, fatigue, fever, malaise, weight gain or weight loss  Respiratory ROS: no cough, shortness of breath, or wheezing  Cardiovascular ROS: no chest pain or dyspnea on exertion  Gastrointestinal ROS: no abdominal pain, change in bowel habits, or black or bloody stools  Genito-Urinary ROS: no dysuria, trouble voiding, or hematuria  Musculoskeletal ROS: chronic low back pain  Neurological ROS: negative for - behavioral changes, memory loss, numbness/tingling, tremors or weakness    Exercise: walking at work, not really with extra exercise    In general patient otherwise reports feeling well. Objective:     Physical Exam:  /74 (Site: Right Upper Arm)   Pulse 73   Temp 98.6 °F (37 °C)   Ht 6' 2\" (1.88 m)   Wt 297 lb (134.7 kg)   SpO2 98%   BMI 38.13 kg/m²         Gen: Well, NAD, Alert, Oriented x 3   HEENT: EOMI, eyes clear, MMM  Skin: without rash or jaundice  Neck: no significant lymphadenopathy or thyromegaly  Lungs: CTA B w/out Rales/Wheezes/Rhonchi, Good respiratory effort   Heart: RRR, S1S2, w/out M/R/G, non-displaced PMI   Neuro: Neurovascularly intact w/ Sensory/Motor intact UE/LE Bilaterally. He is generally comfortable at this time in the office      Assessment:      Diagnosis Orders   1. Preventative health care  PSA screening   2. Screen for colon cancer  Sofia Solano MD, Gastroenterology, [place]   3. Essential hypertension  CBC    Comprehensive Metabolic Panel    Lipid Panel   4. Gastroesophageal reflux disease, unspecified whether esophagitis present     5. Chronic low back pain, unspecified back pain laterality, unspecified whether sciatica present     6. Screening PSA (prostate specific antigen)  PSA screening        Plan:     Refills given  Labs as ordered    Patient Counseling:  --Nutrition: Stressed importance of moderation in sodium/caffeine intake, saturated fat and cholesterol, caloric balance, sufficient intake of fresh fruits, vegetables, fiber-  --Exercise: Stressed the importance of regular exercise. --Dental health: Discussed importance of regulardental visits.   --Immunizations reviewed UTD    Discussed diet and exercise  Weight loss              Dayron Painter MD

## 2021-03-19 DIAGNOSIS — Z01.818 PRE-OP TESTING: Primary | ICD-10-CM

## 2021-03-22 NOTE — TELEPHONE ENCOUNTER
Pharmacy requesting medication refill.  Please approve or deny this request.    Rx requested:  Requested Prescriptions     Pending Prescriptions Disp Refills    Sod Picosulfate-Mag Ox-Cit Acd 10-3.5-12 MG-GM -GM/160ML SOLN       Sig: Take by mouth         Last Office Visit:   Visit date not found      Next Visit Date:  Future Appointments   Date Time Provider Thelma Sheldon   4/9/2021  4:30 PM Keiry Laird, [address]   9/16/2021  8:00 AM Brooklyn Arevalo MD [facility] [place]

## 2021-04-09 ENCOUNTER — NURSE ONLY (OUTPATIENT)
Dept: PRIMARY CARE CLINIC | Age: 51
End: 2021-04-09

## 2021-04-09 DIAGNOSIS — Z01.818 PRE-OP TESTING: ICD-10-CM

## 2021-04-10 LAB
SARS-COV-2: NOT DETECTED
SOURCE: NORMAL

## 2021-04-16 ENCOUNTER — ANCILLARY PROCEDURE (OUTPATIENT)
Dept: ENDOSCOPY | Age: 51
End: 2021-04-16
Attending: INTERNAL MEDICINE
Payer: COMMERCIAL

## 2021-04-16 ENCOUNTER — HOSPITAL ENCOUNTER (OUTPATIENT)
Age: 51
Setting detail: OUTPATIENT SURGERY
Discharge: HOME | End: 2021-04-16
Attending: INTERNAL MEDICINE | Admitting: INTERNAL MEDICINE
Payer: COMMERCIAL

## 2021-04-16 ENCOUNTER — ANESTHESIA (OUTPATIENT)
Dept: ENDOSCOPY | Age: 51
End: 2021-04-16
Payer: COMMERCIAL

## 2021-04-16 ENCOUNTER — ANESTHESIA EVENT (OUTPATIENT)
Dept: ENDOSCOPY | Age: 51
End: 2021-04-16
Payer: COMMERCIAL

## 2021-04-16 VITALS — SYSTOLIC BLOOD PRESSURE: 115 MMHG | DIASTOLIC BLOOD PRESSURE: 63 MMHG | OXYGEN SATURATION: 98 %

## 2021-04-16 VITALS
HEART RATE: 58 BPM | OXYGEN SATURATION: 98 % | RESPIRATION RATE: 18 BRPM | SYSTOLIC BLOOD PRESSURE: 117 MMHG | DIASTOLIC BLOOD PRESSURE: 74 MMHG

## 2021-04-16 PROCEDURE — 2580000003 HC RX 258

## 2021-04-16 PROCEDURE — 7100000010 HC PHASE II RECOVERY - FIRST 15 MIN: Performed by: INTERNAL MEDICINE

## 2021-04-16 PROCEDURE — 6360000002 HC RX W HCPCS: Performed by: NURSE ANESTHETIST, CERTIFIED REGISTERED

## 2021-04-16 PROCEDURE — 88305 TISSUE EXAM BY PATHOLOGIST: CPT

## 2021-04-16 PROCEDURE — 2500000003 HC RX 250 WO HCPCS: Performed by: NURSE ANESTHETIST, CERTIFIED REGISTERED

## 2021-04-16 PROCEDURE — 2580000003 HC RX 258: Performed by: INTERNAL MEDICINE

## 2021-04-16 PROCEDURE — 2580000003 HC RX 258: Performed by: NURSE ANESTHETIST, CERTIFIED REGISTERED

## 2021-04-16 PROCEDURE — 45380 COLONOSCOPY AND BIOPSY: CPT | Performed by: INTERNAL MEDICINE

## 2021-04-16 PROCEDURE — 7100000011 HC PHASE II RECOVERY - ADDTL 15 MIN: Performed by: INTERNAL MEDICINE

## 2021-04-16 PROCEDURE — 3700000001 HC ADD 15 MINUTES (ANESTHESIA): Performed by: INTERNAL MEDICINE

## 2021-04-16 PROCEDURE — 6370000000 HC RX 637 (ALT 250 FOR IP): Performed by: INTERNAL MEDICINE

## 2021-04-16 PROCEDURE — 3700000000 HC ANESTHESIA ATTENDED CARE: Performed by: INTERNAL MEDICINE

## 2021-04-16 PROCEDURE — 3609027000 HC COLONOSCOPY: Performed by: INTERNAL MEDICINE

## 2021-04-16 PROCEDURE — 2709999900 HC NON-CHARGEABLE SUPPLY: Performed by: INTERNAL MEDICINE

## 2021-04-16 RX ORDER — SODIUM CHLORIDE 9 MG/ML
INJECTION, SOLUTION INTRAVENOUS
Status: COMPLETED
Start: 2021-04-16 | End: 2021-04-16

## 2021-04-16 RX ORDER — PROPOFOL 10 MG/ML
INJECTION, EMULSION INTRAVENOUS PRN
Status: DISCONTINUED | OUTPATIENT
Start: 2021-04-16 | End: 2021-04-16 | Stop reason: SDUPTHER

## 2021-04-16 RX ORDER — MAGNESIUM HYDROXIDE 1200 MG/15ML
LIQUID ORAL PRN
Status: DISCONTINUED | OUTPATIENT
Start: 2021-04-16 | End: 2021-04-16 | Stop reason: ALTCHOICE

## 2021-04-16 RX ORDER — SODIUM CHLORIDE 9 MG/ML
INJECTION, SOLUTION INTRAVENOUS CONTINUOUS PRN
Status: DISCONTINUED | OUTPATIENT
Start: 2021-04-16 | End: 2021-04-16 | Stop reason: SDUPTHER

## 2021-04-16 RX ORDER — SIMETHICONE 20 MG/.3ML
EMULSION ORAL PRN
Status: DISCONTINUED | OUTPATIENT
Start: 2021-04-16 | End: 2021-04-16 | Stop reason: ALTCHOICE

## 2021-04-16 RX ORDER — MAGNESIUM HYDROXIDE 1200 MG/15ML
1000 LIQUID ORAL ONCE
Status: DISCONTINUED | OUTPATIENT
Start: 2021-04-16 | End: 2021-04-16 | Stop reason: HOSPADM

## 2021-04-16 RX ORDER — LIDOCAINE HYDROCHLORIDE 20 MG/ML
INJECTION, SOLUTION INFILTRATION; PERINEURAL PRN
Status: DISCONTINUED | OUTPATIENT
Start: 2021-04-16 | End: 2021-04-16 | Stop reason: SDUPTHER

## 2021-04-16 RX ADMIN — SODIUM CHLORIDE 500 ML: 9 INJECTION, SOLUTION INTRAVENOUS at 07:59

## 2021-04-16 RX ADMIN — LIDOCAINE HYDROCHLORIDE 50 MG: 20 INJECTION, SOLUTION INFILTRATION; PERINEURAL at 08:31

## 2021-04-16 RX ADMIN — PROPOFOL 300 MG: 10 INJECTION, EMULSION INTRAVENOUS at 08:31

## 2021-04-16 RX ADMIN — SODIUM CHLORIDE: 9 INJECTION, SOLUTION INTRAVENOUS at 08:29

## 2021-04-16 ASSESSMENT — LIFESTYLE VARIABLES: SMOKING_STATUS: 1

## 2021-04-16 ASSESSMENT — PULMONARY FUNCTION TESTS
PIF_VALUE: 1

## 2021-04-16 NOTE — LETTER
[address] [place] [address] [place] [id number]  [address] [facility] SALOME Dejesus  [address] [place] [id number]    April 20, 2021              Dear Mr. Dejesus,    The pathology report indicated that the polyp removed from your colon was an hyperplastic polyp. This is the type of polyp that, if left unattended, may lead to colon cancer. This particular polyp no longer poses a threat to you because it has been completely removed. However, in the future, it is possible that additional polyps might grow in your colon. Your colon will need to be re-inspected in 10 years. We have updated your health maintenance to show the time for reinspection. Please karissa your calendar and call the office a few weeks ahead of time to be certain to have this repeat procedure scheduled.       Sincerely,          Elsy Richards MD

## 2021-04-16 NOTE — H&P
TIME DAILY PRN 1/30/20   Sammie Doyle MD       Allergies: No Known Allergies     History of allergic reaction to anesthesia:  No    Past Medical History:  Past Medical History:   Diagnosis Date    Chronic back pain     GERD (gastroesophageal reflux disease)     Heartburn     HTN (hypertension)        Past Surgical History:  Past Surgical History:   Procedure Laterality Date    APPENDECTOMY      BACK SURGERY      KNEE ARTHROSCOPY Left 9/23/2020    ARTHROSCOPY LEFT KNEE, PARTIAL MEDIAL MENISECTOMY performed by Jihan Mckeon MD at [address] [place] [address] CYST EXCISION  2005    TONSILLECTOMY      UPPER GASTROINTESTINAL ENDOSCOPY  2008    Hiatal hernia, normal stomach, normal duodenum       Social History:  Social History     Tobacco Use    Smoking status: Current Every Day Smoker     Packs/day: 0.00    Smokeless tobacco: Never Used    Tobacco comment: Vapor   Substance Use Topics    Alcohol use: Yes     Alcohol/week: 0.0 standard drinks     Comment: occasionally    Drug use: No       Vital Signs: There were no vitals filed for this visit. Physical Exam:  Cardiac:  [x]WNL []Comments:  Pulmonary:  [x]WNL   []Comments:   Neuro/Mental Status:  [x]WNL  []Comments:  Abdominal:  [x]WNL    []Comments:  Other:   []WNL  []Comments:    Informed Consent:  The risks and benefits of the procedure have been discussed with either the patient or if they cannot consent, their representative. Assessment:  Patient examined and appropriate for planned sedation and procedure. Plan:  Proceed with planned sedation and procedure as above. The patient was counseled at length about risks of alvin COVID-19 in the perioperative and any recovery window from the procedure. The patient was made aware that alvin COVID-19 may worsen their prognosis for recovery from their procedure and lend to a higher morbidity and-all mortality risk.   The patient was given the option of postponing the

## 2021-04-16 NOTE — ANESTHESIA PRE PROCEDURE
MEDIAL MENISECTOMY performed by Belinda Will MD at [address] [place] [address] CYST EXCISION  2005    TONSILLECTOMY      UPPER GASTROINTESTINAL ENDOSCOPY  2008    Hiatal hernia, normal stomach, normal duodenum       Social History:    Social History     Tobacco Use    Smoking status: Current Every Day Smoker     Packs/day: 0.00    Smokeless tobacco: Never Used    Tobacco comment: Vapor   Substance Use Topics    Alcohol use: Yes     Alcohol/week: 0.0 standard drinks     Comment: occasionally                                Ready to quit: Not Answered  Counseling given: Not Answered  Comment: Vapor      Vital Signs (Current): There were no vitals filed for this visit.                                            BP Readings from Last 3 Encounters:   03/15/21 130/74   09/23/20 (!) 159/82   09/23/20 127/85       NPO Status: Time of last liquid consumption: 2300                        Time of last solid consumption: 1800                        Date of last liquid consumption: 04/15/21                        Date of last solid food consumption: 04/12/21    BMI:   Wt Readings from Last 3 Encounters:   03/15/21 297 lb (134.7 kg)   09/23/20 290 lb (131.5 kg)   09/14/20 290 lb (131.5 kg)     There is no height or weight on file to calculate BMI.    CBC:   Lab Results   Component Value Date    WBC 6.9 09/14/2020    RBC 4.98 09/14/2020    HGB 14.9 09/14/2020    HCT 43.9 09/14/2020    MCV 88.3 09/14/2020    RDW 13.5 09/14/2020     09/14/2020       CMP:   Lab Results   Component Value Date     09/14/2020    K 3.9 09/14/2020     09/14/2020    CO2 24 09/14/2020    BUN 17 09/14/2020    CREATININE 0.79 09/14/2020    GFRAA >60.0 09/14/2020    LABGLOM >60.0 09/14/2020    GLUCOSE 102 09/14/2020    GLUCOSE 84 04/23/2012    PROT 7.7 01/30/2020    CALCIUM 8.8 09/14/2020    BILITOT 1.1 01/30/2020    ALKPHOS 57 01/30/2020    AST 26 01/30/2020    ALT 44 01/30/2020       POC Tests: No results for input(s): POCGLU, POCNA, POCK, POCCL, POCBUN, POCHEMO, POCHCT in the last 72 hours. Coags:   Lab Results   Component Value Date    PROTIME 9.4 06/27/2014    INR 0.9 06/27/2014    APTT 26.7 06/27/2014       HCG (If Applicable): No results found for: PREGTESTUR, PREGSERUM, HCG, HCGQUANT     ABGs: No results found for: PHART, PO2ART, CBC8GKO, AYB3QKX, BEART, F4NCROSC     Type & Screen (If Applicable):  No results found for: LABABO, LABRH    Drug/Infectious Status (If Applicable):  No results found for: HIV, HEPCAB    COVID-19 Screening (If Applicable):   Lab Results   Component Value Date    COVID19 Not Detected 04/09/2021           Anesthesia Evaluation  Patient summary reviewed and Nursing notes reviewed  Airway: Mallampati: II  TM distance: >3 FB   Neck ROM: full  Mouth opening: > = 3 FB Dental: normal exam         Pulmonary:   (+) current smoker          Patient smoked on day of surgery. Cardiovascular:  Exercise tolerance: good (>4 METS),   (+) hypertension:,          Beta Blocker:  Not on Beta Blocker         Neuro/Psych:                ROS comment: Chronic pain, fibromyalgia GI/Hepatic/Renal:   (+) GERD:, bowel prep, morbid obesity          Endo/Other:              Pt had no PAT visit       Abdominal:   (+) obese,         Vascular: negative vascular ROS. Anesthesia Plan      MAC     ASA 3       Induction: intravenous. Anesthetic plan and risks discussed with patient. Plan discussed with attending.                   JB Sanabria - CRNA   4/16/2021

## 2021-06-14 DIAGNOSIS — I10 ESSENTIAL HYPERTENSION: ICD-10-CM

## 2021-06-14 DIAGNOSIS — Z12.5 SCREENING PSA (PROSTATE SPECIFIC ANTIGEN): ICD-10-CM

## 2021-06-14 DIAGNOSIS — Z00.00 PREVENTATIVE HEALTH CARE: ICD-10-CM

## 2021-06-14 LAB
ALBUMIN SERPL-MCNC: 4.4 G/DL (ref 3.5–4.6)
ALP BLD-CCNC: 60 U/L (ref 35–104)
ALT SERPL-CCNC: 24 U/L (ref 0–41)
ANION GAP SERPL CALCULATED.3IONS-SCNC: 11 MEQ/L (ref 9–15)
AST SERPL-CCNC: 23 U/L (ref 0–40)
BILIRUB SERPL-MCNC: 0.6 MG/DL (ref 0.2–0.7)
BUN BLDV-MCNC: 18 MG/DL (ref 6–20)
CALCIUM SERPL-MCNC: 9.6 MG/DL (ref 8.5–9.9)
CHLORIDE BLD-SCNC: 106 MEQ/L (ref 95–107)
CHOLESTEROL, TOTAL: 155 MG/DL (ref 0–199)
CO2: 22 MEQ/L (ref 20–31)
CREAT SERPL-MCNC: 0.8 MG/DL (ref 0.7–1.2)
GFR AFRICAN AMERICAN: >60
GFR NON-AFRICAN AMERICAN: >60
GLOBULIN: 2.7 G/DL (ref 2.3–3.5)
GLUCOSE BLD-MCNC: 96 MG/DL (ref 70–99)
HCT VFR BLD CALC: 41.4 % (ref 42–52)
HDLC SERPL-MCNC: 47 MG/DL (ref 40–59)
HEMOGLOBIN: 14.2 G/DL (ref 14–18)
LDL CHOLESTEROL CALCULATED: 98 MG/DL (ref 0–129)
MCH RBC QN AUTO: 30 PG (ref 27–31.3)
MCHC RBC AUTO-ENTMCNC: 34.4 % (ref 33–37)
MCV RBC AUTO: 87.2 FL (ref 80–100)
PDW BLD-RTO: 13.9 % (ref 11.5–14.5)
PLATELET # BLD: 310 K/UL (ref 130–400)
POTASSIUM SERPL-SCNC: 4.4 MEQ/L (ref 3.4–4.9)
PROSTATE SPECIFIC ANTIGEN: 1.09 NG/ML (ref 0–3.89)
RBC # BLD: 4.74 M/UL (ref 4.7–6.1)
SODIUM BLD-SCNC: 139 MEQ/L (ref 135–144)
TOTAL PROTEIN: 7.1 G/DL (ref 6.3–8)
TRIGL SERPL-MCNC: 50 MG/DL (ref 0–150)
WBC # BLD: 5.5 K/UL (ref 4.8–10.8)

## 2021-08-02 DIAGNOSIS — M54.50 CHRONIC LOW BACK PAIN, UNSPECIFIED BACK PAIN LATERALITY, UNSPECIFIED WHETHER SCIATICA PRESENT: ICD-10-CM

## 2021-08-02 DIAGNOSIS — M54.50 CHRONIC LOW BACK PAIN: ICD-10-CM

## 2021-08-02 DIAGNOSIS — G89.29 CHRONIC LOW BACK PAIN, UNSPECIFIED BACK PAIN LATERALITY, UNSPECIFIED WHETHER SCIATICA PRESENT: ICD-10-CM

## 2021-08-02 DIAGNOSIS — G89.29 CHRONIC LOW BACK PAIN: ICD-10-CM

## 2021-08-03 RX ORDER — GABAPENTIN 300 MG/1
CAPSULE ORAL
Qty: 90 CAPSULE | Refills: 1 | Status: SHIPPED | OUTPATIENT
Start: 2021-08-03 | End: 2021-08-23

## 2021-08-03 RX ORDER — CELECOXIB 200 MG/1
CAPSULE ORAL
Qty: 90 CAPSULE | Refills: 2 | Status: SHIPPED | OUTPATIENT
Start: 2021-08-03 | End: 2022-06-21

## 2021-08-03 NOTE — TELEPHONE ENCOUNTER
lov 3/15/21 annual    Gabapentin last prescribed 3/8/21 with 1 refill. Looks like they are good until sep. Okay to fill?

## 2021-08-22 DIAGNOSIS — G89.29 CHRONIC LOW BACK PAIN: ICD-10-CM

## 2021-08-22 DIAGNOSIS — M54.50 CHRONIC LOW BACK PAIN: ICD-10-CM

## 2021-08-23 RX ORDER — GABAPENTIN 300 MG/1
CAPSULE ORAL
Qty: 90 CAPSULE | Refills: 1 | Status: SHIPPED | OUTPATIENT
Start: 2021-08-23 | End: 2021-12-09

## 2021-09-16 ENCOUNTER — OFFICE VISIT (OUTPATIENT)
Dept: FAMILY MEDICINE CLINIC | Age: 51
End: 2021-09-16
Payer: COMMERCIAL

## 2021-09-16 VITALS
OXYGEN SATURATION: 98 % | HEART RATE: 72 BPM | HEIGHT: 74 IN | BODY MASS INDEX: 37.86 KG/M2 | TEMPERATURE: 97.3 F | DIASTOLIC BLOOD PRESSURE: 70 MMHG | SYSTOLIC BLOOD PRESSURE: 112 MMHG | WEIGHT: 295 LBS

## 2021-09-16 DIAGNOSIS — I10 ESSENTIAL HYPERTENSION: Primary | ICD-10-CM

## 2021-09-16 DIAGNOSIS — M54.50 CHRONIC LOW BACK PAIN, UNSPECIFIED BACK PAIN LATERALITY, UNSPECIFIED WHETHER SCIATICA PRESENT: ICD-10-CM

## 2021-09-16 DIAGNOSIS — G89.29 CHRONIC LOW BACK PAIN, UNSPECIFIED BACK PAIN LATERALITY, UNSPECIFIED WHETHER SCIATICA PRESENT: ICD-10-CM

## 2021-09-16 DIAGNOSIS — K21.9 GASTROESOPHAGEAL REFLUX DISEASE, UNSPECIFIED WHETHER ESOPHAGITIS PRESENT: ICD-10-CM

## 2021-09-16 PROCEDURE — 99213 OFFICE O/P EST LOW 20 MIN: CPT | Performed by: FAMILY MEDICINE

## 2021-09-16 NOTE — PROGRESS NOTES
Subjective:      Chief Complaint   Patient presents with    Annual Exam     wellness check, labs        Marquita Domínguez is a 48 y.o. male     Here for checkup  Annual labs done in March    Active     Does use e-cig    Back pain from moving  Feeling better  Chronic back issues     celebrex and flexeril ok    Chronic issues under control with current regimen   See ROS     No concerns with meds  Compliant with therapy  Trying to be active    Wt Readings from Last 3 Encounters:   09/16/21 295 lb (133.8 kg)   03/15/21 297 lb (134.7 kg)   09/23/20 290 lb (131.5 kg)         Past Medical History:   Diagnosis Date    Chronic back pain     GERD (gastroesophageal reflux disease)     Heartburn     HTN (hypertension)      Past Surgical History:   Procedure Laterality Date    APPENDECTOMY      BACK SURGERY      COLONOSCOPY N/A 4/16/2021    COLORECTAL CANCER SCREENING, NOT HIGH RISK performed by Jayashree Campuzano MD at [facility] ARTHROSCOPY Left 9/23/2020    ARTHROSCOPY LEFT KNEE, PARTIAL MEDIAL MENISECTOMY performed by Alonzo Gant MD at [address] [facility] CYST EXCISION  2005    TONSILLECTOMY      UPPER GASTROINTESTINAL ENDOSCOPY  2008    Hiatal hernia, normal stomach, normal duodenum     Family History   Problem Relation Age of Onset    Crohn's Disease Brother     High Blood Pressure Father     Diabetes Father     Coronary Art Dis Neg Hx      Social History     Socioeconomic History    Marital status:      Spouse name: None    Number of children: None    Years of education: None    Highest education level: None   Occupational History    None   Tobacco Use    Smoking status: Former Smoker     Packs/day: 0.00     Types: Cigarettes    Smokeless tobacco: Never Used    Tobacco comment: Vapor   Vaping Use    Vaping Use: Every day    Substances: Always   Substance and Sexual Activity    Alcohol use:  Yes     Alcohol/week: 0.0 standard drinks     Comment: occasionally    Drug use: No    Sexual activity: None   Other Topics Concern    None   Social History Narrative    None     Social Determinants of Health     Financial Resource Strain: Low Risk     Difficulty of Paying Living Expenses: Not hard at all   Food Insecurity: No Food Insecurity    Worried About Running Out of Food in the Last Year: Never true    Kaylynn of Food in the Last Year: Never true   Transportation Needs: No Transportation Needs    Lack of Transportation (Medical): No    Lack of Transportation (Non-Medical): No   Physical Activity:     Days of Exercise per Week:     Minutes of Exercise per Session:    Stress:     Feeling of Stress :    Social Connections:     Frequency of Communication with Friends and Family:     Frequency of Social Gatherings with Friends and Family:     Attends Judaism Services:     Active Member of Clubs or Organizations:     Attends Club or Organization Meetings:     Marital Status:    Intimate Partner Violence:     Fear of Current or Ex-Partner:     Emotionally Abused:     Physically Abused:     Sexually Abused:      Current Outpatient Medications   Medication Sig Dispense Refill    gabapentin (NEURONTIN) 300 MG capsule TAKE 1 CAPSULE BY MOUTH ONE TIME A DAY IN THE EVENING 90 capsule 1    celecoxib (CELEBREX) 200 MG capsule TAKE 1 CAPSULE BY MOUTH ONE TIME A DAY 90 capsule 2    amLODIPine (NORVASC) 5 MG tablet TAKE 1 TABLET BY MOUTH ONE TIME A DAY 90 tablet 3    omeprazole (PRILOSEC) 20 MG delayed release capsule TAKE 1 CAPSULE BY MOUTH ONE TIME A DAY 90 capsule 3    cyclobenzaprine (FLEXERIL) 10 MG tablet TAKE 1 TABLET BY MOUTH ONE TIME DAILY (Patient taking differently: TAKE 1 TABLET BY MOUTH ONE TIME DAILY PRN) 90 tablet 3     No current facility-administered medications for this visit.      No Known Allergies      Review of Systems:   General ROS: negative for - nausea, vomiting, chills, fatigue, fever, malaise, weight gain or weight loss  Respiratory ROS: no cough, shortness of breath, or wheezing  Cardiovascular ROS: no chest pain or dyspnea on exertion  Gastrointestinal ROS: no abdominal pain, change in bowel habits, or black or bloody stools  Genito-Urinary ROS: no dysuria, trouble voiding, or hematuria  Musculoskeletal ROS: chronic low back pain  Neurological ROS: negative for - behavioral changes, memory loss, numbness/tingling, tremors or weakness    Exercise: walking at work, not really with extra exercise    In general patient otherwise reports feeling well. Objective:     Physical Exam:  /70 (Site: Left Upper Arm)   Pulse 72   Temp 97.3 °F (36.3 °C)   Ht 6' 2\" (1.88 m)   Wt 295 lb (133.8 kg)   SpO2 98%   BMI 37.88 kg/m²         Gen: Well, NAD, Alert, Oriented x 3   HEENT: EOMI, eyes clear, MMM  Skin: without rash or jaundice  Neck: no significant lymphadenopathy or thyromegaly  Lungs: CTA B w/out Rales/Wheezes/Rhonchi, Good respiratory effort   Heart: RRR, S1S2, w/out M/R/G, non-displaced PMI   Neuro: Neurovascularly intact w/ Sensory/Motor intact UE/LE Bilaterally. He is generally comfortable at this time in the office      Assessment:      Diagnosis Orders   1. Essential hypertension     2. Gastroesophageal reflux disease, unspecified whether esophagitis present     3. Chronic low back pain, unspecified back pain laterality, unspecified whether sciatica present          Plan:     Labs are UTD    Patient Counseling:  --Nutrition: Stressed importance of moderation in sodium/caffeine intake, saturated fat and cholesterol, caloric balance, sufficient intake of fresh fruits, vegetables, fiber-  --Exercise: Stressed the importance of regular exercise. --Dental health: Discussed importance of regulardental visits.   --Immunizations reviewed UTD    Discussed diet and exercise  Weight loss              Vicky Montenegro MD

## 2021-12-08 DIAGNOSIS — G89.29 CHRONIC LOW BACK PAIN: ICD-10-CM

## 2021-12-08 DIAGNOSIS — M54.50 CHRONIC LOW BACK PAIN: ICD-10-CM

## 2021-12-09 RX ORDER — GABAPENTIN 300 MG/1
CAPSULE ORAL
Qty: 90 CAPSULE | Refills: 1 | Status: SHIPPED | OUTPATIENT
Start: 2021-12-09 | End: 2022-09-23

## 2021-12-09 NOTE — TELEPHONE ENCOUNTER
Future Appointments    Encounter Information    Provider Department Appt Notes   3/17/2022 Amna Pierce MD [facility] 6 mos       Recent Visits    09/16/2021 Essential hypertension   aMxim Finnegan MD     08/23/21 last fill

## 2022-02-23 DIAGNOSIS — K21.9 GASTROESOPHAGEAL REFLUX DISEASE: ICD-10-CM

## 2022-02-23 DIAGNOSIS — I10 ESSENTIAL HYPERTENSION: ICD-10-CM

## 2022-02-23 NOTE — TELEPHONE ENCOUNTER
Future Appointments    Encounter Information    Provider Department Appt Notes   3/17/2022 Fawn Romero MD [facility] 6 mos       Past Visits    Date Provider Specialty Visit Type Primary Dx   09/16/2021 Fawn Romero MD Family Medicine Office Visit Essential hypertension

## 2022-02-24 RX ORDER — OMEPRAZOLE 20 MG/1
CAPSULE, DELAYED RELEASE ORAL
Qty: 90 CAPSULE | Refills: 3 | Status: SHIPPED | OUTPATIENT
Start: 2022-02-24 | End: 2022-02-25 | Stop reason: SDUPTHER

## 2022-02-24 RX ORDER — AMLODIPINE BESYLATE 5 MG/1
TABLET ORAL
Qty: 90 TABLET | Refills: 3 | Status: SHIPPED | OUTPATIENT
Start: 2022-02-24 | End: 2022-02-25 | Stop reason: SDUPTHER

## 2022-02-25 DIAGNOSIS — K21.9 GASTROESOPHAGEAL REFLUX DISEASE: ICD-10-CM

## 2022-02-25 DIAGNOSIS — I10 ESSENTIAL HYPERTENSION: ICD-10-CM

## 2022-02-25 RX ORDER — AMLODIPINE BESYLATE 5 MG/1
TABLET ORAL
Qty: 90 TABLET | Refills: 3 | Status: SHIPPED | OUTPATIENT
Start: 2022-02-25 | End: 2022-09-23 | Stop reason: SDUPTHER

## 2022-02-25 RX ORDER — OMEPRAZOLE 20 MG/1
CAPSULE, DELAYED RELEASE ORAL
Qty: 90 CAPSULE | Refills: 3 | Status: SHIPPED | OUTPATIENT
Start: 2022-02-25 | End: 2022-09-23 | Stop reason: SDUPTHER

## 2022-03-22 ENCOUNTER — OFFICE VISIT (OUTPATIENT)
Dept: FAMILY MEDICINE CLINIC | Age: 52
End: 2022-03-22
Payer: COMMERCIAL

## 2022-03-22 VITALS
BODY MASS INDEX: 34.22 KG/M2 | TEMPERATURE: 98.5 F | OXYGEN SATURATION: 98 % | HEART RATE: 78 BPM | SYSTOLIC BLOOD PRESSURE: 120 MMHG | DIASTOLIC BLOOD PRESSURE: 80 MMHG | WEIGHT: 266.6 LBS | HEIGHT: 74 IN

## 2022-03-22 DIAGNOSIS — M54.50 CHRONIC LOW BACK PAIN, UNSPECIFIED BACK PAIN LATERALITY, UNSPECIFIED WHETHER SCIATICA PRESENT: ICD-10-CM

## 2022-03-22 DIAGNOSIS — L91.8 SKIN TAGS, MULTIPLE ACQUIRED: ICD-10-CM

## 2022-03-22 DIAGNOSIS — K21.9 GASTROESOPHAGEAL REFLUX DISEASE, UNSPECIFIED WHETHER ESOPHAGITIS PRESENT: ICD-10-CM

## 2022-03-22 DIAGNOSIS — I10 ESSENTIAL HYPERTENSION: Primary | ICD-10-CM

## 2022-03-22 DIAGNOSIS — G89.29 CHRONIC LOW BACK PAIN, UNSPECIFIED BACK PAIN LATERALITY, UNSPECIFIED WHETHER SCIATICA PRESENT: ICD-10-CM

## 2022-03-22 PROCEDURE — 99214 OFFICE O/P EST MOD 30 MIN: CPT | Performed by: FAMILY MEDICINE

## 2022-03-22 SDOH — ECONOMIC STABILITY: FOOD INSECURITY: WITHIN THE PAST 12 MONTHS, THE FOOD YOU BOUGHT JUST DIDN'T LAST AND YOU DIDN'T HAVE MONEY TO GET MORE.: NEVER TRUE

## 2022-03-22 SDOH — ECONOMIC STABILITY: FOOD INSECURITY: WITHIN THE PAST 12 MONTHS, YOU WORRIED THAT YOUR FOOD WOULD RUN OUT BEFORE YOU GOT MONEY TO BUY MORE.: NEVER TRUE

## 2022-03-22 ASSESSMENT — SOCIAL DETERMINANTS OF HEALTH (SDOH): HOW HARD IS IT FOR YOU TO PAY FOR THE VERY BASICS LIKE FOOD, HOUSING, MEDICAL CARE, AND HEATING?: NOT HARD AT ALL

## 2022-03-22 ASSESSMENT — PATIENT HEALTH QUESTIONNAIRE - PHQ9
SUM OF ALL RESPONSES TO PHQ QUESTIONS 1-9: 0
1. LITTLE INTEREST OR PLEASURE IN DOING THINGS: 0
SUM OF ALL RESPONSES TO PHQ9 QUESTIONS 1 & 2: 0
SUM OF ALL RESPONSES TO PHQ QUESTIONS 1-9: 0
2. FEELING DOWN, DEPRESSED OR HOPELESS: 0
SUM OF ALL RESPONSES TO PHQ QUESTIONS 1-9: 0
SUM OF ALL RESPONSES TO PHQ QUESTIONS 1-9: 0

## 2022-03-22 NOTE — PROGRESS NOTES
Subjective:      Chief Complaint   Patient presents with    Hypertension     6 month    Skin Tag     lindy sargent removed at next Timothy Sylvester is a 46 y.o. male     Here for checkup  Annual labs coming due, wants to wait for \"be well\"    Has some skin tags wants to schedule to remove    Active   New job, has to be more physical and pass fitness test     Cut out vaping       Chronic back issues     celebrex and flexeril ok    Chronic issues under control with current regimen   See ROS     No concerns with meds  Compliant with therapy  Trying to be active    Wt Readings from Last 3 Encounters:   03/22/22 266 lb 9.6 oz (120.9 kg)   09/16/21 295 lb (133.8 kg)   03/15/21 297 lb (134.7 kg)         Past Medical History:   Diagnosis Date    Chronic back pain     GERD (gastroesophageal reflux disease)     Heartburn     HTN (hypertension)      Past Surgical History:   Procedure Laterality Date    APPENDECTOMY      BACK SURGERY      COLONOSCOPY N/A 4/16/2021    COLORECTAL CANCER SCREENING, NOT HIGH RISK performed by Enrike Schneider MD at [address] [facility] ARTHROSCOPY Left 9/23/2020    ARTHROSCOPY LEFT KNEE, PARTIAL MEDIAL MENISECTOMY performed by Mary Alice Pearson MD at [address] [facility] CYST EXCISION  2005    TONSILLECTOMY      UPPER GASTROINTESTINAL ENDOSCOPY  2008    Hiatal hernia, normal stomach, normal duodenum     Family History   Problem Relation Age of Onset    Crohn's Disease Brother     High Blood Pressure Father     Diabetes Father     Coronary Art Dis Neg Hx      Social History     Socioeconomic History    Marital status:      Spouse name: None    Number of children: None    Years of education: None    Highest education level: None   Occupational History    None   Tobacco Use    Smoking status: Former Smoker     Packs/day: 0.00     Years: 5.00     Pack years: 0.00     Types: Cigarettes     Quit date: 1/1/2007     Years since quitting: 15.2  Smokeless tobacco: Never Used    Tobacco comment: Vapor   Vaping Use    Vaping Use: Every day    Substances: Always   Substance and Sexual Activity    Alcohol use: Yes     Alcohol/week: 0.0 standard drinks     Comment: occasionally    Drug use: No    Sexual activity: None   Other Topics Concern    None   Social History Narrative    None     Social Determinants of Health     Financial Resource Strain: Low Risk     Difficulty of Paying Living Expenses: Not hard at all   Food Insecurity: No Food Insecurity    Worried About Running Out of Food in the Last Year: Never true    Kaylynn of Food in the Last Year: Never true   Transportation Needs: No Transportation Needs    Lack of Transportation (Medical): No    Lack of Transportation (Non-Medical):  No   Physical Activity:     Days of Exercise per Week: Not on file    Minutes of Exercise per Session: Not on file   Stress:     Feeling of Stress : Not on file   Social Connections:     Frequency of Communication with Friends and Family: Not on file    Frequency of Social Gatherings with Friends and Family: Not on file    Attends Episcopal Services: Not on file    Active Member of [address] [place] [address] or Organizations: Not on file    Attends Club or Organization Meetings: Not on file    Marital Status: Not on file   Intimate Partner Violence:     Fear of Current or Ex-Partner: Not on file    Emotionally Abused: Not on file    Physically Abused: Not on file    Sexually Abused: Not on file   Housing Stability:     Unable to Pay for Housing in the Last Year: Not on file    Number of [place] in the Last Year: Not on file    Unstable Housing in the Last Year: Not on file     Current Outpatient Medications   Medication Sig Dispense Refill    amLODIPine (NORVASC) 5 MG tablet Take 1 tab by mouth daily 90 tablet 3    omeprazole (PRILOSEC) 20 MG delayed release capsule Take one cap by mouth daily 90 capsule 3    gabapentin (NEURONTIN) 300 MG capsule TAKE ONE CAPSULE BY MOUTH EVERY EVENING 90 capsule 1    celecoxib (CELEBREX) 200 MG capsule TAKE 1 CAPSULE BY MOUTH ONE TIME A DAY 90 capsule 2    cyclobenzaprine (FLEXERIL) 10 MG tablet TAKE 1 TABLET BY MOUTH ONE TIME DAILY (Patient taking differently: TAKE 1 TABLET BY MOUTH ONE TIME DAILY PRN) 90 tablet 3     No current facility-administered medications for this visit. No Known Allergies      Review of Systems:   General ROS: negative for - nausea, vomiting, chills, fatigue, fever, malaise, weight gain or weight loss  Respiratory ROS: no cough, shortness of breath, or wheezing  Cardiovascular ROS: no chest pain or dyspnea on exertion  Gastrointestinal ROS: no abdominal pain, change in bowel habits, or black or bloody stools  Genito-Urinary ROS: no dysuria, trouble voiding, or hematuria  Musculoskeletal ROS: chronic low back pain  Neurological ROS: negative for - behavioral changes, memory loss, numbness/tingling, tremors or weakness    Exercise: walking at work, not really with extra exercise    In general patient otherwise reports feeling well. Objective:     Physical Exam:  /80 (Site: Left Upper Arm)   Pulse 78   Temp 98.5 °F (36.9 °C)   Ht 6' 2\" (1.88 m)   Wt 266 lb 9.6 oz (120.9 kg)   SpO2 98%   BMI 34.23 kg/m²         Gen: Well, NAD, Alert, Oriented x 3   HEENT: EOMI, eyes clear, MMM  Skin: without rash or jaundice, mulitiple skin tags  Neck: no significant lymphadenopathy or thyromegaly  Lungs: CTA B w/out Rales/Wheezes/Rhonchi, Good respiratory effort   Heart: RRR, S1S2, w/out M/R/G, non-displaced PMI   Neuro: Neurovascularly intact w/ Sensory/Motor intact UE/LE Bilaterally. He is generally comfortable at this time in the office      Assessment:      Diagnosis Orders   1. Essential hypertension     2. Gastroesophageal reflux disease, unspecified whether esophagitis present     3. Chronic low back pain, unspecified back pain laterality, unspecified whether sciatica present     4.  Skin tags, multiple acquired          Plan:     Labs are UTD    Patient Counseling:  --Nutrition: Stressed importance of moderation in sodium/caffeine intake, saturated fat and cholesterol, caloric balance, sufficient intake of fresh fruits, vegetables, fiber-  --Exercise: Stressed the importance of regular exercise. --Dental health: Discussed importance of regulardental visits.   --Immunizations reviewed UTD    Discussed diet and exercise    Has been successful with weight loss               Angelica Meyer MD

## 2022-04-08 ENCOUNTER — OFFICE VISIT (OUTPATIENT)
Dept: FAMILY MEDICINE CLINIC | Age: 52
End: 2022-04-08
Payer: COMMERCIAL

## 2022-04-08 VITALS — TEMPERATURE: 96.7 F | OXYGEN SATURATION: 98 % | HEART RATE: 69 BPM

## 2022-04-08 DIAGNOSIS — L91.8 INFLAMED SKIN TAG: Primary | ICD-10-CM

## 2022-04-08 DIAGNOSIS — D18.01 CHERRY ANGIOMA: ICD-10-CM

## 2022-04-08 PROCEDURE — 11200 RMVL SKIN TAGS UP TO&INC 15: CPT | Performed by: STUDENT IN AN ORGANIZED HEALTH CARE EDUCATION/TRAINING PROGRAM

## 2022-04-08 PROCEDURE — 99213 OFFICE O/P EST LOW 20 MIN: CPT | Performed by: STUDENT IN AN ORGANIZED HEALTH CARE EDUCATION/TRAINING PROGRAM

## 2022-04-08 ASSESSMENT — ENCOUNTER SYMPTOMS
VOMITING: 0
SORE THROAT: 0
COUGH: 0
SHORTNESS OF BREATH: 0
SINUS PRESSURE: 0
ABDOMINAL PAIN: 0

## 2022-04-08 NOTE — PATIENT INSTRUCTIONS
Cryosurgery (Liquid Nitrogen Therapy) Aftercare Instructions    Cryosurgery involves using liquid nitrogen (approximately -320 degrees F) to freeze and destroy abnormal skin tissue including, but not limited to, warts, skin tags, seborrheic keratosis, actinic keratosis, and other benign or pre-cancerous growths. Your provider, Dr. Carlos Ellington, uses liquid nitrogen by spraying the liquid directly on the skin growth or applying it on using a cotton swab. This causes some stinging and burning while the  growth is being frozen and may continue while it thaws. Throbbing may persist on any areas, especially the face or forehead, but this will not last long. (We recommend Tylenol or aspirin  for any persistent pain.)    It is best to leave the procedure site alone. A blister may form in 1-3 days which is desired to allow the damaged tissue (precancerous lesion, wart, or whatever lesion is being removed) to separate from healthy tissue. Please cover the blister with a bandage to prevent blister breakage and dirt exposure. The wound(s) should remain dry while there is a blister. If this is a sweaty location like the foot you may need to change socks multiple times per day. When the blister(s) pop, apply Vaseline (NOT triple antibiotic, like Neosporin) and a bandage to the wound. If blistering does not cause the lesion to separate from healthy skin, gently help separate the lesion from normal skin on day 3-5. You may stop using a Band-Aid once the wound has formed a scab. Continue using Vaseline at least once a day to keep the scab moist.  This will improve wound healing. The scab may appear yellow while moist, this is not a sign of infection. If the wound is infected pus will drain from the site. Please call the office immediately if these symptoms occur. If this treatment was for a large wart you may notice a plug of skin may fall out of the area that was treated.   This is the center of the wart and it is appropriate for it to come out. Healing takes 1-3 weeks, after which the skin may look perfectly normal or slightly lighter in color.

## 2022-04-08 NOTE — PROGRESS NOTES
2022    Feliberto Dejesus (:  1970) is a 46 y.o. male, here for evaluation of the following medical concerns:  Chief Complaint   Patient presents with    Skin Problem     Skin tags on the neck, left arm pit, and right inner thigh. Would like them removed. Lesion on chest and right arm that he would like looked at. Has changed in size. HPI  Skin lesions  Red lesion on his chest and right arm  Also with irritated skin tags on his neck, left axilla and right inner thigh  Has previously had them removed, scissor removal    Review of Systems   Constitutional: Negative for chills and fever. HENT: Negative for congestion, sinus pressure and sore throat. Respiratory: Negative for cough and shortness of breath. Cardiovascular: Negative for chest pain and palpitations. Gastrointestinal: Negative for abdominal pain and vomiting. Musculoskeletal: Negative for arthralgias and myalgias. Skin: Negative for rash and wound. Skin lesions, irritated skin tags   Neurological: Negative for speech difficulty and light-headedness. Psychiatric/Behavioral: Negative for suicidal ideas. The patient is not nervous/anxious. Prior to Visit Medications    Medication Sig Taking? Authorizing Provider   amLODIPine (NORVASC) 5 MG tablet Take 1 tab by mouth daily Yes Harish Moya MD   omeprazole (PRILOSEC) 20 MG delayed release capsule Take one cap by mouth daily Yes Harish Moya MD   gabapentin (NEURONTIN) 300 MG capsule TAKE ONE CAPSULE BY MOUTH EVERY EVENING Yes Harish Moya MD   celecoxib (CELEBREX) 200 MG capsule TAKE 1 CAPSULE BY MOUTH ONE TIME A DAY Yes Harish Moya MD   cyclobenzaprine (FLEXERIL) 10 MG tablet TAKE 1 TABLET BY MOUTH ONE TIME DAILY  Patient taking differently: TAKE 1 TABLET BY MOUTH ONE TIME DAILY PRN Yes Harish Moya MD        There are no discontinued medications.     No Known Allergies    Past Medical History:   Diagnosis Date    Chronic back pain     GERD (gastroesophageal reflux disease)     Heartburn     HTN (hypertension)        Past Surgical History:   Procedure Laterality Date    APPENDECTOMY      BACK SURGERY      COLONOSCOPY N/A 4/16/2021    COLORECTAL CANCER SCREENING, NOT HIGH RISK performed by Kemal Yan MD at [address] ARTHROSCOPY Left 9/23/2020    ARTHROSCOPY LEFT KNEE, PARTIAL MEDIAL MENISECTOMY performed by Jorge Werner MD at [address] [facility] CYST EXCISION  2005    TONSILLECTOMY      UPPER GASTROINTESTINAL ENDOSCOPY  2008    Hiatal hernia, normal stomach, normal duodenum       Social History     Socioeconomic History    Marital status:      Spouse name: Not on file    Number of children: Not on file    Years of education: Not on file    Highest education level: Not on file   Occupational History    Not on file   Tobacco Use    Smoking status: Former Smoker     Packs/day: 0.00     Years: 5.00     Pack years: 0.00     Types: Cigarettes     Quit date: 1/1/2007     Years since quitting: 15.2    Smokeless tobacco: Never Used    Tobacco comment: Vapor   Vaping Use    Vaping Use: Every day    Substances: Always   Substance and Sexual Activity    Alcohol use: Yes     Alcohol/week: 0.0 standard drinks     Comment: occasionally    Drug use: No    Sexual activity: Not on file   Other Topics Concern    Not on file   Social History Narrative    Not on file     Social Determinants of Health     Financial Resource Strain: Low Risk     Difficulty of Paying Living Expenses: Not hard at all   Food Insecurity: No Food Insecurity    Worried About Running Out of Food in the Last Year: Never true    [address] in the Last Year: Never true   Transportation Needs: No Transportation Needs    Lack of Transportation (Medical): No    Lack of Transportation (Non-Medical):  No   Physical Activity:     Days of Exercise per Week: Not on file    Minutes of Exercise per Session: Not on file Stress:     Feeling of Stress : Not on file   Social Connections:     Frequency of Communication with Friends and Family: Not on file    Frequency of Social Gatherings with Friends and Family: Not on file    Attends Sabianist Services: Not on file    Active Member of [address] [place] [address] [employer] or Organizations: Not on file    Attends Club or Organization Meetings: Not on file    Marital Status: Not on file   Intimate Partner Violence:     Fear of Current or Ex-Partner: Not on file    Emotionally Abused: Not on file    Physically Abused: Not on file    Sexually Abused: Not on file   Housing Stability:     Unable to Pay for Housing in the Last Year: Not on file    Number of [place] in the Last Year: Not on file    Unstable Housing in the Last Year: Not on file        Family History   Problem Relation Age of Onset    Crohn's Disease Brother     High Blood Pressure Father     Diabetes Father     Coronary Art Dis Neg Hx        Vitals:    04/08/22 0814   Pulse: 69   Temp: 96.7 °F (35.9 °C)   SpO2: 98%       Estimated body mass index is 34.23 kg/m² as calculated from the following:    Height as of 3/22/22: 6' 2\" (1.88 m). Weight as of 3/22/22: 266 lb 9.6 oz (120.9 kg). No results for input(s): WBC, RBC, HGB, HCT, MCV, MCH, MCHC, RDW, PLT, MPV in the last 72 hours. No results for input(s): NA, K, CL, CO2, BUN, CREATININE, GLUCOSE, CALCIUM, PROT, LABALBU, BILITOT, ALKPHOS, AST, ALT in the last 72 hours. No results found for: LABA1C    No results found. Physical Exam  Constitutional:       General: He is not in acute distress. Appearance: Normal appearance. HENT:      Head: Normocephalic and atraumatic. Eyes:      Extraocular Movements: Extraocular movements intact. Conjunctiva/sclera: Conjunctivae normal.   Musculoskeletal:         General: No deformity. Normal range of motion. Skin:     Findings: No lesion or rash.       Comments: Cherry angioma on the left chest and right arm  Irritated skin tags left neck x6, left axilla x2, right inner thigh x3   Neurological:      General: No focal deficit present. Mental Status: He is alert. Mental status is at baseline. Psychiatric:         Mood and Affect: Mood normal.         Behavior: Behavior normal.         Thought Content: Thought content normal.         ASSESSMENT/PLAN:  1. Inflamed skin tag  Procedure: The procedure was discussed with the patient. All questions were answered and alternative options discussed. The patient is aware of the risks of bleeding, infection and scarring. Informed consent paperwork was signed by the patient. Liquid nitrogen was applied to the affected areas until freezing was noted then a thaw was allowed between dosing for a total of 3 cycles. Applied to 11 lesion(s). The patient tolerated the procedure well. Post op instructions verbally given. A printed copy provided. 2. Cherry angioma  Discussed benign pathology      There are no discontinued medications.    ---------------------------------------------------------------------  Side effects, adverse effects of the medication prescribed today, as well as treatment plan/ rationale and result expectations have been discussed with the patient who expresses understanding and desires to proceed. Close follow up to evaluate treatment results and for coordination of care. I have reviewed the patient's medical history in detail and updated the computerized patient record. As always, patient is advised that if symptoms worsen in any way they will proceed to the nearest emergency room. --------------------------------------------------------------------    Return in about 2 weeks (around 4/22/2022) for 15 min cryo Dr. Jessica Romo. An  electronic signature was used to authenticate this note.     --Mahamed Murrell DO on 4/8/2022 at 8:38 AM

## 2022-05-06 ENCOUNTER — OFFICE VISIT (OUTPATIENT)
Dept: FAMILY MEDICINE CLINIC | Age: 52
End: 2022-05-06
Payer: COMMERCIAL

## 2022-05-06 VITALS
HEART RATE: 73 BPM | SYSTOLIC BLOOD PRESSURE: 132 MMHG | HEIGHT: 74 IN | BODY MASS INDEX: 33.21 KG/M2 | DIASTOLIC BLOOD PRESSURE: 86 MMHG | OXYGEN SATURATION: 97 % | TEMPERATURE: 97.3 F | WEIGHT: 258.8 LBS

## 2022-05-06 DIAGNOSIS — R11.0 NAUSEA: ICD-10-CM

## 2022-05-06 DIAGNOSIS — R10.13 ABDOMINAL PAIN, EPIGASTRIC: ICD-10-CM

## 2022-05-06 DIAGNOSIS — R10.13 DYSPEPSIA: Primary | ICD-10-CM

## 2022-05-06 LAB
ALBUMIN SERPL-MCNC: 4.7 G/DL (ref 3.5–4.6)
ALP BLD-CCNC: 65 U/L (ref 35–104)
ALT SERPL-CCNC: 26 U/L (ref 0–41)
ANION GAP SERPL CALCULATED.3IONS-SCNC: 13 MEQ/L (ref 9–15)
AST SERPL-CCNC: 27 U/L (ref 0–40)
BILIRUB SERPL-MCNC: 1.1 MG/DL (ref 0.2–0.7)
BUN BLDV-MCNC: 18 MG/DL (ref 6–20)
CALCIUM SERPL-MCNC: 9.7 MG/DL (ref 8.5–9.9)
CHLORIDE BLD-SCNC: 101 MEQ/L (ref 95–107)
CO2: 25 MEQ/L (ref 20–31)
CREAT SERPL-MCNC: 0.83 MG/DL (ref 0.7–1.2)
GFR AFRICAN AMERICAN: >60
GFR NON-AFRICAN AMERICAN: >60
GLOBULIN: 3 G/DL (ref 2.3–3.5)
GLUCOSE BLD-MCNC: 88 MG/DL (ref 70–99)
HCT VFR BLD CALC: 44.1 % (ref 42–52)
HEMOGLOBIN: 14.5 G/DL (ref 14–18)
LIPASE: 24 U/L (ref 12–95)
MCH RBC QN AUTO: 29.4 PG (ref 27–31.3)
MCHC RBC AUTO-ENTMCNC: 33 % (ref 33–37)
MCV RBC AUTO: 89.2 FL (ref 80–100)
PDW BLD-RTO: 13.7 % (ref 11.5–14.5)
PLATELET # BLD: 351 K/UL (ref 130–400)
POTASSIUM SERPL-SCNC: 4.5 MEQ/L (ref 3.4–4.9)
RBC # BLD: 4.94 M/UL (ref 4.7–6.1)
SODIUM BLD-SCNC: 139 MEQ/L (ref 135–144)
TOTAL PROTEIN: 7.7 G/DL (ref 6.3–8)
WBC # BLD: 7.7 K/UL (ref 4.8–10.8)

## 2022-05-06 PROCEDURE — 99213 OFFICE O/P EST LOW 20 MIN: CPT | Performed by: FAMILY MEDICINE

## 2022-05-06 RX ORDER — FAMOTIDINE 20 MG/1
20 TABLET, FILM COATED ORAL 2 TIMES DAILY
Qty: 60 TABLET | Refills: 3 | Status: SHIPPED | OUTPATIENT
Start: 2022-05-06 | End: 2022-09-23

## 2022-05-06 RX ORDER — ONDANSETRON 4 MG/1
4 TABLET, ORALLY DISINTEGRATING ORAL 3 TIMES DAILY PRN
Qty: 21 TABLET | Refills: 0 | Status: SHIPPED | OUTPATIENT
Start: 2022-05-06 | End: 2022-09-23

## 2022-05-06 NOTE — PROGRESS NOTES
Subjective:      Chief Complaint   Patient presents with    GI Problem     debby morning dry heaving, feels like rasor blades, severe cramp through rib cage every time streaching, x 2 weeks         Isidro Hallman is a 46 y.o. male     Stomach issues for last couple weeks  Dry heaving in the morning  Sharp pain  Cramps    Has had colonoscopy/EGD  Hiatal hernia, normal otherwise     Pain all across abdomen  Cramps across right side of chest     No diarrhea  Some constipation  No blood in stool     Wt Readings from Last 3 Encounters:   05/06/22 258 lb 12.8 oz (117.4 kg)   03/22/22 266 lb 9.6 oz (120.9 kg)   09/16/21 295 lb (133.8 kg)         Past Medical History:   Diagnosis Date    Chronic back pain     GERD (gastroesophageal reflux disease)     Heartburn     HTN (hypertension)      Past Surgical History:   Procedure Laterality Date    APPENDECTOMY      BACK SURGERY      COLONOSCOPY N/A 4/16/2021    COLORECTAL CANCER SCREENING, NOT HIGH RISK performed by Rachelle Yang MD at [address] [facility] ARTHROSCOPY Left 9/23/2020    ARTHROSCOPY LEFT KNEE, PARTIAL MEDIAL MENISECTOMY performed by Pippa Puckett MD at [address] [facility] CYST EXCISION  2005    TONSILLECTOMY      UPPER GASTROINTESTINAL ENDOSCOPY  2008    Hiatal hernia, normal stomach, normal duodenum     Family History   Problem Relation Age of Onset    Crohn's Disease Brother     High Blood Pressure Father     Diabetes Father     Coronary Art Dis Neg Hx      Social History     Socioeconomic History    Marital status:      Spouse name: None    Number of children: None    Years of education: None    Highest education level: None   Occupational History    None   Tobacco Use    Smoking status: Former Smoker     Packs/day: 0.00     Years: 5.00     Pack years: 0.00     Types: Cigarettes     Quit date: 1/1/2007     Years since quitting: 15.3    Smokeless tobacco: Never Used    Tobacco comment: Vapor   Vaping Use    Vaping Use: Every day    Substances: Always   Substance and Sexual Activity    Alcohol use: Yes     Alcohol/week: 0.0 standard drinks     Comment: occasionally    Drug use: No    Sexual activity: None   Other Topics Concern    None   Social History Narrative    None     Social Determinants of Health     Financial Resource Strain: Low Risk     Difficulty of Paying Living Expenses: Not hard at all   Food Insecurity: No Food Insecurity    Worried About Running Out of Food in the Last Year: Never true    Kaylynn of Food in the Last Year: Never true   Transportation Needs: No Transportation Needs    Lack of Transportation (Medical): No    Lack of Transportation (Non-Medical):  No   Physical Activity:     Days of Exercise per Week: Not on file    Minutes of Exercise per Session: Not on file   Stress:     Feeling of Stress : Not on file   Social Connections:     Frequency of Communication with Friends and Family: Not on file    Frequency of Social Gatherings with Friends and Family: Not on file    Attends Roman Catholic Services: Not on file    Active Member of [address] [place] [address] or Organizations: Not on file    Attends Club or Organization Meetings: Not on file    Marital Status: Not on file   Intimate Partner Violence:     Fear of Current or Ex-Partner: Not on file    Emotionally Abused: Not on file    Physically Abused: Not on file    Sexually Abused: Not on file   Housing Stability:     Unable to Pay for Housing in the Last Year: Not on file    Number of [place] in the Last Year: Not on file    Unstable Housing in the Last Year: Not on file     Current Outpatient Medications   Medication Sig Dispense Refill    amLODIPine (NORVASC) 5 MG tablet Take 1 tab by mouth daily 90 tablet 3    omeprazole (PRILOSEC) 20 MG delayed release capsule Take one cap by mouth daily 90 capsule 3    gabapentin (NEURONTIN) 300 MG capsule TAKE ONE CAPSULE BY MOUTH EVERY EVENING 90 capsule 1    celecoxib (CELEBREX) 200 MG capsule TAKE 1 CAPSULE BY MOUTH ONE TIME A DAY 90 capsule 2    cyclobenzaprine (FLEXERIL) 10 MG tablet TAKE 1 TABLET BY MOUTH ONE TIME DAILY (Patient taking differently: TAKE 1 TABLET BY MOUTH ONE TIME DAILY PRN) 90 tablet 3    ondansetron (ZOFRAN-ODT) 4 MG disintegrating tablet Take 1 tablet by mouth 3 times daily as needed for Nausea or Vomiting 21 tablet 0    famotidine (PEPCID) 20 MG tablet Take 1 tablet by mouth 2 times daily 60 tablet 3     No current facility-administered medications for this visit. No Known Allergies      Review of Systems:   General ROS: negative for - nausea, vomiting, chills, fatigue, fever, malaise, weight gain or weight loss  Respiratory ROS: no cough, shortness of breath, or wheezing  Cardiovascular ROS: no chest pain or dyspnea on exertion  Gastrointestinal ROS: per HPI  Genito-Urinary ROS: no dysuria, trouble voiding, or hematuria  Musculoskeletal ROS: chronic low back pain  Neurological ROS: negative for - behavioral changes, memory loss, numbness/tingling, tremors or weakness    Exercise: walking at work, not really with extra exercise    In general patient otherwise reports feeling well. Objective:     Physical Exam:  /86 (Site: Left Upper Arm)   Pulse 73   Temp 97.3 °F (36.3 °C)   Ht 6' 2\" (1.88 m)   Wt 258 lb 12.8 oz (117.4 kg)   SpO2 97%   BMI 33.23 kg/m²         Gen: Well, NAD, Alert, Oriented x 3   HEENT: EOMI, eyes clear, MMM  Skin: without rash or jaundice, mulitiple skin tags  Neck: no significant lymphadenopathy or thyromegaly  Lungs: CTA B w/out Rales/Wheezes/Rhonchi, Good respiratory effort   Heart: RRR, S1S2, w/out M/R/G, non-displaced PMI   Neuro: Neurovascularly intact w/ Sensory/Motor intact UE/LE Bilaterally. He is generally comfortable at this time in the office      Assessment:      Diagnosis Orders   1. Dyspepsia  ondansetron (ZOFRAN-ODT) 4 MG disintegrating tablet    famotidine (PEPCID) 20 MG tablet   2.  Abdominal pain, epigastric Lipase    CBC    Comprehensive Metabolic Panel    US GALLBLADDER RUQ   3. Nausea  ondansetron (ZOFRAN-ODT) 4 MG disintegrating tablet        Plan:     Labs as ordered    zofran  pepcid  prilosec    ruq Dariela Davila MD

## 2022-05-18 ENCOUNTER — HOSPITAL ENCOUNTER (OUTPATIENT)
Dept: ULTRASOUND IMAGING | Age: 52
Discharge: HOME | End: 2022-05-20
Payer: COMMERCIAL

## 2022-05-18 DIAGNOSIS — R10.13 ABDOMINAL PAIN, EPIGASTRIC: ICD-10-CM

## 2022-05-18 PROCEDURE — 76705 ECHO EXAM OF ABDOMEN: CPT

## 2022-05-20 DIAGNOSIS — R10.11 RUQ PAIN: Primary | ICD-10-CM

## 2022-06-20 DIAGNOSIS — M54.50 CHRONIC LOW BACK PAIN, UNSPECIFIED BACK PAIN LATERALITY, UNSPECIFIED WHETHER SCIATICA PRESENT: ICD-10-CM

## 2022-06-20 DIAGNOSIS — G89.29 CHRONIC LOW BACK PAIN, UNSPECIFIED BACK PAIN LATERALITY, UNSPECIFIED WHETHER SCIATICA PRESENT: ICD-10-CM

## 2022-06-21 RX ORDER — CELECOXIB 200 MG/1
CAPSULE ORAL
Qty: 90 CAPSULE | Refills: 2 | Status: SHIPPED | OUTPATIENT
Start: 2022-06-21 | End: 2022-09-23 | Stop reason: SDUPTHER

## 2022-09-23 ENCOUNTER — OFFICE VISIT (OUTPATIENT)
Dept: FAMILY MEDICINE CLINIC | Age: 52
End: 2022-09-23
Payer: COMMERCIAL

## 2022-09-23 VITALS
SYSTOLIC BLOOD PRESSURE: 112 MMHG | DIASTOLIC BLOOD PRESSURE: 78 MMHG | BODY MASS INDEX: 29.39 KG/M2 | TEMPERATURE: 97.9 F | WEIGHT: 229 LBS | OXYGEN SATURATION: 98 % | HEART RATE: 72 BPM | HEIGHT: 74 IN

## 2022-09-23 DIAGNOSIS — K21.9 GASTROESOPHAGEAL REFLUX DISEASE, UNSPECIFIED WHETHER ESOPHAGITIS PRESENT: ICD-10-CM

## 2022-09-23 DIAGNOSIS — R11.0 NAUSEA: ICD-10-CM

## 2022-09-23 DIAGNOSIS — M54.50 CHRONIC LOW BACK PAIN, UNSPECIFIED BACK PAIN LATERALITY, UNSPECIFIED WHETHER SCIATICA PRESENT: ICD-10-CM

## 2022-09-23 DIAGNOSIS — G89.29 CHRONIC LOW BACK PAIN, UNSPECIFIED BACK PAIN LATERALITY, UNSPECIFIED WHETHER SCIATICA PRESENT: ICD-10-CM

## 2022-09-23 DIAGNOSIS — R10.13 DYSPEPSIA: ICD-10-CM

## 2022-09-23 DIAGNOSIS — I10 ESSENTIAL HYPERTENSION: ICD-10-CM

## 2022-09-23 DIAGNOSIS — Z00.00 PREVENTATIVE HEALTH CARE: Primary | ICD-10-CM

## 2022-09-23 PROCEDURE — 99396 PREV VISIT EST AGE 40-64: CPT | Performed by: FAMILY MEDICINE

## 2022-09-23 RX ORDER — CELECOXIB 200 MG/1
CAPSULE ORAL
Qty: 90 CAPSULE | Refills: 2 | Status: SHIPPED | OUTPATIENT
Start: 2022-09-23

## 2022-09-23 RX ORDER — AMLODIPINE BESYLATE 5 MG/1
TABLET ORAL
Qty: 90 TABLET | Refills: 3 | Status: SHIPPED | OUTPATIENT
Start: 2022-09-23

## 2022-09-23 RX ORDER — ONDANSETRON 4 MG/1
4 TABLET, ORALLY DISINTEGRATING ORAL 3 TIMES DAILY PRN
Qty: 21 TABLET | Refills: 0 | Status: CANCELLED | OUTPATIENT
Start: 2022-09-23

## 2022-09-23 RX ORDER — CYCLOBENZAPRINE HCL 10 MG
TABLET ORAL
Qty: 90 TABLET | Refills: 3 | Status: SHIPPED | OUTPATIENT
Start: 2022-09-23

## 2022-09-23 RX ORDER — OMEPRAZOLE 20 MG/1
CAPSULE, DELAYED RELEASE ORAL
Qty: 90 CAPSULE | Refills: 3 | Status: SHIPPED | OUTPATIENT
Start: 2022-09-23

## 2022-09-23 NOTE — PROGRESS NOTES
Subjective:      Chief Complaint   Patient presents with    Annual Exam     Physical     Cyst     Right lower leg. Starting to hurt, so Wilson is a 46 y.o. male     Here for checkup  Needs Annual labs    Active   Does use e-cig    Chronic back issues     celebrex and flexeril ok    Chronic issues under control with current regimen   See ROS     No concerns with meds  Compliant with therapy  Trying to be active    Wt Readings from Last 3 Encounters:   09/23/22 229 lb (103.9 kg)   05/06/22 258 lb 12.8 oz (117.4 kg)   03/22/22 266 lb 9.6 oz (120.9 kg)         Past Medical History:   Diagnosis Date    Chronic back pain     GERD (gastroesophageal reflux disease)     Heartburn     HTN (hypertension)      Past Surgical History:   Procedure Laterality Date    APPENDECTOMY      BACK SURGERY      COLONOSCOPY N/A 4/16/2021    COLORECTAL CANCER SCREENING, NOT HIGH RISK performed by Margot Jurado MD at [place] [address] ARTHROSCOPY Left 9/23/2020    ARTHROSCOPY LEFT KNEE, PARTIAL MEDIAL MENISECTOMY performed by Michael Dalal MD at [place] [address]  2005    TONSILLECTOMY      UPPER GASTROINTESTINAL ENDOSCOPY  2008    Hiatal hernia, normal stomach, normal duodenum     Family History   Problem Relation Age of Onset    Crohn's Disease Brother     High Blood Pressure Father     Diabetes Father     Coronary Art Dis Neg Hx      Social History     Socioeconomic History    Marital status:      Spouse name: None    Number of children: None    Years of education: None    Highest education level: None   Tobacco Use    Smoking status: Former     Packs/day: 0.00     Years: 5.00     Pack years: 0.00     Types: Cigarettes     Quit date: 1/1/2007     Years since quitting: 15.7    Smokeless tobacco: Never    Tobacco comments:     Vapor   Vaping Use    Vaping Use: Every day    Substances: Always   Substance and Sexual Activity    Alcohol use:  Yes     Alcohol/week: 0.0 standard drinks     Comment: occasionally    Drug use: No     Social Determinants of Health     Financial Resource Strain: Low Risk     Difficulty of Paying Living Expenses: Not hard at all   Food Insecurity: No Food Insecurity    Worried About Running Out of Food in the Last Year: Never true    Ran Out of Food in the Last Year: Never true   Transportation Needs: No Transportation Needs    Lack of Transportation (Medical): No    Lack of Transportation (Non-Medical): No     Current Outpatient Medications   Medication Sig Dispense Refill    amLODIPine (NORVASC) 5 MG tablet Take 1 tab by mouth daily 90 tablet 3    celecoxib (CELEBREX) 200 MG capsule TAKE 1 CAPSULE BY MOUTH ONE TIME A DAY 90 capsule 2    cyclobenzaprine (FLEXERIL) 10 MG tablet TAKE 1 TABLET BY MOUTH ONE TIME DAILY PRN 90 tablet 3    omeprazole (PRILOSEC) 20 MG delayed release capsule Take one cap by mouth daily 90 capsule 3     No current facility-administered medications for this visit. No Known Allergies      Review of Systems:   General ROS: negative for - nausea, vomiting, chills, fatigue, fever, malaise, weight gain or weight loss  Respiratory ROS: no cough, shortness of breath, or wheezing  Cardiovascular ROS: no chest pain or dyspnea on exertion  Gastrointestinal ROS: no abdominal pain, change in bowel habits, or black or bloody stools  Genito-Urinary ROS: no dysuria, trouble voiding, or hematuria  Musculoskeletal ROS: chronic low back pain  Neurological ROS: negative for - behavioral changes, memory loss, numbness/tingling, tremors or weakness    Exercise: walking at work, not really with extra exercise    In general patient otherwise reports feeling well.        Objective:     Physical Exam:  /78 (Site: Left Upper Arm)   Pulse 72   Temp 97.9 °F (36.6 °C)   Ht 6' 2\" (1.88 m)   Wt 229 lb (103.9 kg)   SpO2 98%   BMI 29.40 kg/m²         Gen: Well, NAD, Alert, Oriented x 3   HEENT: EOMI, eyes clear, MMM  Skin: without rash or jaundice  Neck: no significant lymphadenopathy or thyromegaly  Lungs: CTA B w/out Rales/Wheezes/Rhonchi, Good respiratory effort   Heart: RRR, S1S2, w/out M/R/G, non-displaced PMI   Neuro: Neurovascularly intact w/ Sensory/Motor intact UE/LE Bilaterally. He is generally comfortable at this time in the office  Right lower leg with warty lesion    Assessment:      Diagnosis Orders   1. Preventative health care  Comprehensive Metabolic Panel    Lipid Panel    CBC    PSA Screening    Hepatitis C Antibody      2. Essential hypertension  amLODIPine (NORVASC) 5 MG tablet      3. Chronic low back pain, unspecified back pain laterality, unspecified whether sciatica present  celecoxib (CELEBREX) 200 MG capsule    cyclobenzaprine (FLEXERIL) 10 MG tablet      4. Gastroesophageal reflux disease  omeprazole (PRILOSEC) 20 MG delayed release capsule      5. Dyspepsia        6. Nausea             Plan:     Refills given  Labs as ordered    Patient Counseling:  --Nutrition: Stressed importance of moderation in sodium/caffeine intake, saturated fat and cholesterol, caloric balance, sufficient intake of fresh fruits, vegetables, fiber-  --Exercise: Stressed the importance of regular exercise. --Dental health: Discussed importance of regulardental visits.   --Immunizations reviewed UTD    Discussed diet and exercise  Weight loss              Lizet Bermudez MD

## 2022-09-27 DIAGNOSIS — Z00.00 PREVENTATIVE HEALTH CARE: ICD-10-CM

## 2022-09-27 LAB
ALBUMIN SERPL-MCNC: 5.2 G/DL (ref 3.5–4.6)
ALP BLD-CCNC: 61 U/L (ref 35–104)
ALT SERPL-CCNC: 15 U/L (ref 0–41)
ANION GAP SERPL CALCULATED.3IONS-SCNC: 11 MEQ/L (ref 9–15)
AST SERPL-CCNC: 16 U/L (ref 0–40)
BILIRUB SERPL-MCNC: 1.6 MG/DL (ref 0.2–0.7)
BUN BLDV-MCNC: 15 MG/DL (ref 6–20)
CALCIUM SERPL-MCNC: 9.9 MG/DL (ref 8.5–9.9)
CHLORIDE BLD-SCNC: 100 MEQ/L (ref 95–107)
CHOLESTEROL, TOTAL: 167 MG/DL (ref 0–199)
CO2: 28 MEQ/L (ref 20–31)
CREAT SERPL-MCNC: 0.86 MG/DL (ref 0.7–1.2)
GFR AFRICAN AMERICAN: >60
GFR NON-AFRICAN AMERICAN: >60
GLOBULIN: 2.9 G/DL (ref 2.3–3.5)
GLUCOSE BLD-MCNC: 100 MG/DL (ref 70–99)
HCT VFR BLD CALC: 42.9 % (ref 42–52)
HDLC SERPL-MCNC: 56 MG/DL (ref 40–59)
HEMOGLOBIN: 14.6 G/DL (ref 14–18)
LDL CHOLESTEROL CALCULATED: 101 MG/DL (ref 0–129)
MCH RBC QN AUTO: 30.8 PG (ref 27–31.3)
MCHC RBC AUTO-ENTMCNC: 34 % (ref 33–37)
MCV RBC AUTO: 90.6 FL (ref 80–100)
PDW BLD-RTO: 13.3 % (ref 11.5–14.5)
PLATELET # BLD: 328 K/UL (ref 130–400)
POTASSIUM SERPL-SCNC: 4.7 MEQ/L (ref 3.4–4.9)
PROSTATE SPECIFIC ANTIGEN: 1.24 NG/ML (ref 0–4)
RBC # BLD: 4.73 M/UL (ref 4.7–6.1)
SODIUM BLD-SCNC: 139 MEQ/L (ref 135–144)
TOTAL PROTEIN: 8.1 G/DL (ref 6.3–8)
TRIGL SERPL-MCNC: 50 MG/DL (ref 0–150)
WBC # BLD: 6.9 K/UL (ref 4.8–10.8)

## 2022-09-28 LAB — HEPATITIS C ANTIBODY: NONREACTIVE

## 2022-11-07 ENCOUNTER — OFFICE VISIT (OUTPATIENT)
Dept: FAMILY MEDICINE CLINIC | Age: 52
End: 2022-11-07
Payer: COMMERCIAL

## 2022-11-07 VITALS
SYSTOLIC BLOOD PRESSURE: 126 MMHG | BODY MASS INDEX: 29.13 KG/M2 | OXYGEN SATURATION: 98 % | DIASTOLIC BLOOD PRESSURE: 82 MMHG | WEIGHT: 227 LBS | TEMPERATURE: 98.1 F | HEART RATE: 71 BPM | HEIGHT: 74 IN

## 2022-11-07 DIAGNOSIS — R10.13 DYSPEPSIA: ICD-10-CM

## 2022-11-07 DIAGNOSIS — S43.401A SPRAIN OF RIGHT SHOULDER, UNSPECIFIED SHOULDER SPRAIN TYPE, INITIAL ENCOUNTER: Primary | ICD-10-CM

## 2022-11-07 DIAGNOSIS — R11.0 NAUSEA: ICD-10-CM

## 2022-11-07 DIAGNOSIS — R10.11 ABDOMINAL PAIN, RIGHT UPPER QUADRANT: ICD-10-CM

## 2022-11-07 PROCEDURE — 3074F SYST BP LT 130 MM HG: CPT | Performed by: FAMILY MEDICINE

## 2022-11-07 PROCEDURE — 3078F DIAST BP <80 MM HG: CPT | Performed by: FAMILY MEDICINE

## 2022-11-07 PROCEDURE — 99213 OFFICE O/P EST LOW 20 MIN: CPT | Performed by: FAMILY MEDICINE

## 2022-11-07 RX ORDER — TRAMADOL HYDROCHLORIDE 50 MG/1
50 TABLET ORAL EVERY 6 HOURS PRN
Qty: 20 TABLET | Refills: 0 | Status: SHIPPED | OUTPATIENT
Start: 2022-11-07 | End: 2022-11-12

## 2022-11-07 RX ORDER — ONDANSETRON 4 MG/1
4 TABLET, ORALLY DISINTEGRATING ORAL 3 TIMES DAILY PRN
Qty: 21 TABLET | Refills: 0 | Status: SHIPPED | OUTPATIENT
Start: 2022-11-07 | End: 2022-11-17

## 2022-11-07 RX ORDER — METHYLPREDNISOLONE 4 MG/1
TABLET ORAL
Qty: 1 KIT | Refills: 0 | Status: SHIPPED | OUTPATIENT
Start: 2022-11-07 | End: 2022-11-17

## 2022-11-07 NOTE — PROGRESS NOTES
Subjective:     Chief Complaint   Patient presents with    Shoulder Injury     Right shoulder, getting ready for physical doing push up and pull ups, can't reach up, any upper motion, x last week of october    GI Problem     Not keeping food down in the morning, would like to go forward with testing         Jenae Bustos is a 46 y.o. male who presents with right shoulder pain. The symptoms began 2 weeks ago. Symptoms were initiated by exercise (push ups/pull ups). Pain is located  anterior shoulder  . Discomfort is described as dull, but then sharp when trying to raise arm. Symptoms are exacerbated by repetitive movements, overhead movements, and lying on the shoulder. Evaluation to date: none. Therapy to date includes ice, celebrex, rest.     Massage gun made it worse     Progressively worsening       Patient's medications, allergies, past medical, surgical, social and family histories were reviewed and updated as appropriate. Objective:      /82 (Site: Left Upper Arm)   Pulse 71   Temp 98.1 °F (36.7 °C)   Ht 6' 2\" (1.88 m)   Wt 227 lb (103 kg)   SpO2 98%   BMI 29.15 kg/m²   Right shoulder: Positive impingement sign, anterior tednerness, unable to raise past 90 degrees without intense pain    Left shoulder: Normal active ROM, no tenderness, no impingement sign         Assessment:      Diagnosis Orders   1. Sprain of right shoulder, unspecified shoulder sprain type, initial encounter  traMADol (ULTRAM) 50 MG tablet    XR SHOULDER RIGHT (MIN 2 VIEWS)    [facility] [place]    methylPREDNISolone (MEDROL DOSEPACK) 4 MG tablet      2. Dyspepsia  ondansetron (ZOFRAN-ODT) 4 MG disintegrating tablet    NM HEPATOBILIARY SCAN W EJECTION FRACTION      3. Nausea  ondansetron (ZOFRAN-ODT) 4 MG disintegrating tablet    NM HEPATOBILIARY SCAN W EJECTION FRACTION      4.  Abdominal pain, right upper quadrant  NM HEPATOBILIARY SCAN W EJECTION FRACTION Plan:       Ortho referral   Medrol gurinder   ICE rest    Gentle ROM    Will proceed with HIDA, as he is still having biliary colick after normal RUQ u/s     Arnol Koroma MD

## 2022-11-11 ENCOUNTER — OFFICE VISIT (OUTPATIENT)
Dept: ORTHOPEDIC SURGERY | Age: 52
End: 2022-11-11
Payer: COMMERCIAL

## 2022-11-11 VITALS
HEIGHT: 74 IN | TEMPERATURE: 98.3 F | BODY MASS INDEX: 28.36 KG/M2 | WEIGHT: 221 LBS | HEART RATE: 66 BPM | OXYGEN SATURATION: 97 %

## 2022-11-11 DIAGNOSIS — S46.011A STRAIN OF RIGHT ROTATOR CUFF CAPSULE, INITIAL ENCOUNTER: Primary | ICD-10-CM

## 2022-11-11 PROCEDURE — 99203 OFFICE O/P NEW LOW 30 MIN: CPT | Performed by: PHYSICIAN ASSISTANT

## 2022-11-11 PROCEDURE — 20610 DRAIN/INJ JOINT/BURSA W/O US: CPT | Performed by: PHYSICIAN ASSISTANT

## 2022-11-11 RX ORDER — MELOXICAM 15 MG/1
15 TABLET ORAL DAILY PRN
Qty: 30 TABLET | Refills: 0 | Status: SHIPPED | OUTPATIENT
Start: 2022-11-11 | End: 2022-11-17

## 2022-11-11 RX ORDER — TRIAMCINOLONE ACETONIDE 40 MG/ML
80 INJECTION, SUSPENSION INTRA-ARTICULAR; INTRAMUSCULAR ONCE
Status: COMPLETED | OUTPATIENT
Start: 2022-11-11 | End: 2022-11-11

## 2022-11-11 RX ORDER — LIDOCAINE HYDROCHLORIDE 10 MG/ML
8 INJECTION, SOLUTION INFILTRATION; PERINEURAL ONCE
Status: COMPLETED | OUTPATIENT
Start: 2022-11-11 | End: 2022-11-11

## 2022-11-11 RX ADMIN — LIDOCAINE HYDROCHLORIDE 8 ML: 10 INJECTION, SOLUTION INFILTRATION; PERINEURAL at 13:28

## 2022-11-11 RX ADMIN — TRIAMCINOLONE ACETONIDE 80 MG: 40 INJECTION, SUSPENSION INTRA-ARTICULAR; INTRAMUSCULAR at 13:26

## 2022-11-11 ASSESSMENT — ENCOUNTER SYMPTOMS: RESPIRATORY NEGATIVE: 1

## 2022-11-11 NOTE — PROGRESS NOTES
Feliberto Dejesus (:  1970) is a 46 y.o. male,New patient, consult from Dr. Ehsan Heard MD here for evaluation of the following chief complaint(s):  New Patient (Pt presents today for initial evaluation of his right shoulder. Xrays obtained by PCP. He states that about a month ago he was working out (doing push-ups and sit ups) and the next morning when he woke up he could not raise his arm without pain and he could not lift most things over 5 lbs. PT denies prior injury. Pt says that he has a burning sensation on the right side of his neck. Pt denies numbness or tingling. Pt is not taking anything for pain. Pain scale: 2/10 with activity:10/10)         ASSESSMENT/PLAN:  1. Strain of right rotator cuff capsule, initial encounter  -     Ambulatory referral to Physical Therapy  -     AL ARTHROCENTESIS ASPIR&/INJ MAJOR JT/BURSA W/O US    Return in about 3 weeks (around 2022). Subjective   SUBJECTIVE/OBJECTIVE:  This is a 28-year-old right-hand-dominant . He states he has been performing pull-ups and push-ups in preparation for a physical capacity test for his employer. He developed right shoulder painful weakness after performing some of these activities. He has seen his primary care physician who recommended a Medrol Dosepak that he has not filled. He is having difficulty sleeping on his right side due to shoulder pain. He denies change in sensation of the right arm. He denies any previous shoulder injury. Review of Systems   Constitutional: Negative. HENT: Negative. Respiratory: Negative. Skin: Negative. Neurological: Negative. Objective   INATION:   THREE XRAY VIEWS OF THE RIGHT SHOULDER       2022 12:55 pm       COMPARISON:   None.        HISTORY:   ORDERING SYSTEM PROVIDED HISTORY: Sprain of right shoulder, unspecified   shoulder sprain type, initial encounter   TECHNOLOGIST PROVIDED HISTORY:   What reading provider will be dictating this exam?->CRC       FINDINGS:   There is no evidence of acute fracture. There is normal alignment. No acute   joint abnormality. No focal osseous lesion. No focal soft tissue abnormality. Impression   Unremarkable right shoulder. Physical Exam  Musculoskeletal:      Comments: Right shoulder-no acromioclavicular, clavicle, SC joint tenderness with palpation. Abduction and abduction strength is strong and symmetrical.  Supraspinatus test elicits painful weakness. Spurling's test is negative. Liftoff is negative. O'Briens is negative. Biceps contour is normal.  Patient's only able to reach L5 behind his back, he reaches T4 behind his back with his left arm. Sensation is intact distally to light touch. Shoulder Injection Procedure Note    Pre-operative Diagnosis:    Diagnosis Orders   1. Strain of right rotator cuff capsule, initial encounter  Ambulatory referral to Physical Therapy    RI ARTHROCENTESIS ASPIR&/INJ MAJOR JT/BURSA W/O US           Post-operative Diagnosis:    Diagnosis Orders   1. Strain of right rotator cuff capsule, initial encounter  Ambulatory referral to Physical Therapy    RI ARTHROCENTESIS ASPIR&/INJ MAJOR JT/BURSA W/O US           Indications: tendonitis    Anesthesia: Ethyl Chloride    Procedure Details     Verbal consent was obtained for the procedure. The shoulder was prepped with iodine and the skin was anesthetized. Using a 22 gauge needle the  is injected with 8 mL 1% lidocaine and 2 mL of triamcinolone (KENALOG) 40mg/ml under the posterior aspect of the acromion. The injection site was cleansed with topical isopropyl alcohol and a dressing was applied. Complications:  None; patient tolerated the procedure well. Explained to the patient he does have a rotator cuff strain. Injected the shoulder today with cortisone. He is to begin physical therapy. He is to avoid a lot of overhead activities. Follow-up with me in about 3 weeks.   On this date 11/11/2022 I have spent 35 minutes reviewing previous notes, test results and face to face with the patient discussing the diagnosis and importance of compliance with the treatment plan as well as documenting on the day of the visit. An electronic signature was used to authenticate this note.     --ZHANG Briseno

## 2022-11-17 ENCOUNTER — OFFICE VISIT (OUTPATIENT)
Dept: FAMILY MEDICINE CLINIC | Age: 52
End: 2022-11-17
Payer: COMMERCIAL

## 2022-11-17 VITALS
HEART RATE: 79 BPM | OXYGEN SATURATION: 98 % | BODY MASS INDEX: 28.36 KG/M2 | WEIGHT: 221 LBS | DIASTOLIC BLOOD PRESSURE: 80 MMHG | SYSTOLIC BLOOD PRESSURE: 128 MMHG | TEMPERATURE: 97.7 F | HEIGHT: 74 IN

## 2022-11-17 DIAGNOSIS — K59.00 CONSTIPATION, UNSPECIFIED CONSTIPATION TYPE: ICD-10-CM

## 2022-11-17 DIAGNOSIS — R11.0 NAUSEA: ICD-10-CM

## 2022-11-17 DIAGNOSIS — R63.4 WEIGHT LOSS: ICD-10-CM

## 2022-11-17 DIAGNOSIS — F41.9 ANXIETY: ICD-10-CM

## 2022-11-17 DIAGNOSIS — F41.9 ANXIETY: Primary | ICD-10-CM

## 2022-11-17 LAB
T4 FREE: 1.65 NG/DL (ref 0.84–1.68)
TSH SERPL DL<=0.05 MIU/L-ACNC: 0.75 UIU/ML (ref 0.44–3.86)

## 2022-11-17 PROCEDURE — 99213 OFFICE O/P EST LOW 20 MIN: CPT | Performed by: FAMILY MEDICINE

## 2022-11-17 PROCEDURE — 3074F SYST BP LT 130 MM HG: CPT | Performed by: FAMILY MEDICINE

## 2022-11-17 PROCEDURE — 3078F DIAST BP <80 MM HG: CPT | Performed by: FAMILY MEDICINE

## 2022-11-17 RX ORDER — ALPRAZOLAM 0.5 MG/1
0.5 TABLET ORAL 3 TIMES DAILY PRN
Qty: 20 TABLET | Refills: 0 | Status: SHIPPED | OUTPATIENT
Start: 2022-11-17 | End: 2022-11-22 | Stop reason: SDUPTHER

## 2022-11-17 RX ORDER — ESCITALOPRAM OXALATE 10 MG/1
10 TABLET ORAL DAILY
Qty: 30 TABLET | Refills: 3 | Status: SHIPPED | OUTPATIENT
Start: 2022-11-17

## 2022-11-17 RX ORDER — PROMETHAZINE HYDROCHLORIDE 25 MG/1
25 TABLET ORAL 4 TIMES DAILY PRN
Qty: 20 TABLET | Refills: 0 | Status: SHIPPED | OUTPATIENT
Start: 2022-11-17 | End: 2022-11-24

## 2022-11-17 NOTE — PROGRESS NOTES
Subjective:      Chief Complaint   Patient presents with    Anxiety     Loosing sleep, can't eat, affecting everything, go over medication         Claudio Roman is a 46 y.o. male     Anxiety/insomnia  Low appetite    Stomach is upset    Got a steroid shot in shoulder, feeling better in that regard     Has lost weight     Wt Readings from Last 3 Encounters:   11/17/22 221 lb (100.2 kg)   11/11/22 221 lb (100.2 kg)   11/07/22 227 lb (103 kg)         Past Medical History:   Diagnosis Date    Chronic back pain     GERD (gastroesophageal reflux disease)     Heartburn     HTN (hypertension)      Past Surgical History:   Procedure Laterality Date    APPENDECTOMY      BACK SURGERY      COLONOSCOPY N/A 4/16/2021    COLORECTAL CANCER SCREENING, NOT HIGH RISK performed by Thalia Cortés MD at [place] [address] ARTHROSCOPY Left 9/23/2020    ARTHROSCOPY LEFT KNEE, PARTIAL MEDIAL MENISECTOMY performed by Shelby Tracey MD at [place] [address]  2005    TONSILLECTOMY      UPPER GASTROINTESTINAL ENDOSCOPY  2008    Hiatal hernia, normal stomach, normal duodenum     Family History   Problem Relation Age of Onset    Crohn's Disease Brother     High Blood Pressure Father     Diabetes Father     Coronary Art Dis Neg Hx      Social History     Socioeconomic History    Marital status:      Spouse name: None    Number of children: None    Years of education: None    Highest education level: None   Tobacco Use    Smoking status: Former     Packs/day: 0.00     Years: 5.00     Pack years: 0.00     Types: Cigarettes     Quit date: 1/1/2007     Years since quitting: 15.8    Smokeless tobacco: Never    Tobacco comments:     Vapor   Vaping Use    Vaping Use: Every day    Substances: Always   Substance and Sexual Activity    Alcohol use:  Yes     Alcohol/week: 0.0 standard drinks     Comment: occasionally    Drug use: No     Social Determinants of Health     Financial Resource Strain: Low Risk Difficulty of Paying Living Expenses: Not hard at all   Food Insecurity: No Food Insecurity    Worried About [facility] in the Last Year: Never true    Ran Out of Food in the Last Year: Never true   Transportation Needs: No Transportation Needs    Lack of Transportation (Medical): No    Lack of Transportation (Non-Medical): No     Current Outpatient Medications   Medication Sig Dispense Refill    ALPRAZolam (XANAX) 0.5 MG tablet Take 1 tablet by mouth 3 times daily as needed for Sleep or Anxiety for up to 7 days. 20 tablet 0    escitalopram (LEXAPRO) 10 MG tablet Take 1 tablet by mouth daily 30 tablet 3    promethazine (PHENERGAN) 25 MG tablet Take 1 tablet by mouth 4 times daily as needed for Nausea 20 tablet 0    amLODIPine (NORVASC) 5 MG tablet Take 1 tab by mouth daily 90 tablet 3    omeprazole (PRILOSEC) 20 MG delayed release capsule Take one cap by mouth daily 90 capsule 3     No current facility-administered medications for this visit. No Known Allergies      Review of Systems:   General ROS: weight loss   Respiratory ROS: no cough, shortness of breath, or wheezing  Cardiovascular ROS: no chest pain or dyspnea on exertion  Gastrointestinal ROS: no abdominal pain, change in bowel habits, or black or bloody stools  Genito-Urinary ROS: no dysuria, trouble voiding, or hematuria  Musculoskeletal ROS: chronic low back pain  Neurological ROS: negative for - behavioral changes, memory loss, numbness/tingling, tremors or weakness    Exercise: walking at work, not really with extra exercise    In general patient otherwise reports feeling well.        Objective:     Physical Exam:  /80 (Site: Right Upper Arm)   Pulse 79   Temp 97.7 °F (36.5 °C)   Ht 6' 2\" (1.88 m)   Wt 221 lb (100.2 kg)   SpO2 98%   BMI 28.37 kg/m²         Gen: Well, NAD, Alert, Oriented x 3   HEENT: EOMI, eyes clear, MMM  Skin: without rash or jaundice  Neck: no significant lymphadenopathy or thyromegaly  Lungs: CTA B w/out Rales/Wheezes/Rhonchi, Good respiratory effort   Heart: RRR, S1S2, w/out M/R/G, non-displaced PMI   Neuro: Neurovascularly intact w/ Sensory/Motor intact UE/LE Bilaterally. Psych: some anxiety     Assessment:      Diagnosis Orders   1. Anxiety  ALPRAZolam (XANAX) 0.5 MG tablet    TSH    T4, Free    escitalopram (LEXAPRO) 10 MG tablet      2. Constipation, unspecified constipation type        3. Weight loss  TSH    T4, Free      4.  Nausea  promethazine (PHENERGAN) 25 MG tablet             Plan:       Check thyroid studies     Xanax prn  Start lexapro    Phenergan prn    SHAYY Lagunas MD

## 2022-11-21 DIAGNOSIS — F41.9 ANXIETY: ICD-10-CM

## 2022-11-21 RX ORDER — ALPRAZOLAM 0.5 MG/1
0.5 TABLET ORAL 3 TIMES DAILY PRN
Qty: 20 TABLET | Refills: 0 | OUTPATIENT
Start: 2022-11-21 | End: 2022-11-28

## 2022-11-21 NOTE — TELEPHONE ENCOUNTER
Future Appointments    Encounter Information    Provider Department Appt Notes   11/29/2022 [place] MEDICINE ROOM 3 [address] [facility] Nuclear Medicine epic scheduled with pt   11/30/2022 Bety Baird, PT [facility] of [address] Required   12/2/2022 Doni Clark [facility] and Sports Medicine 3 week f/u- Sprain of right     Past Visits    Date Provider Specialty Visit Type Primary Dx   11/17/2022 Vlad Odonnell MD Family Medicine Office Visit Anxiety

## 2022-11-22 DIAGNOSIS — F41.9 ANXIETY: ICD-10-CM

## 2022-11-22 NOTE — TELEPHONE ENCOUNTER
Future Appointments    Encounter Information    Provider Department Appt Notes   11/29/2022 [place] MEDICINE ROOM 3 [facility] Nuclear Medicine epic scheduled with pt   11/30/2022 Devora Howell, PT [facility] of [address] Required   12[facility]2/2022 [facility] week f/u- Sprain of right     Past Visits    Date Provider Specialty Visit Type Primary Dx   11/17/2022 Amparo Hernandez MD Family Medicine Office Visit Anxiety

## 2022-11-23 DIAGNOSIS — F41.9 ANXIETY: ICD-10-CM

## 2022-11-23 RX ORDER — ALPRAZOLAM 0.5 MG/1
0.5 TABLET ORAL 3 TIMES DAILY PRN
Qty: 20 TABLET | Refills: 0 | Status: SHIPPED | OUTPATIENT
Start: 2022-11-23 | End: 2022-11-30

## 2022-11-23 RX ORDER — ALPRAZOLAM 0.5 MG/1
0.5 TABLET ORAL 3 TIMES DAILY PRN
Qty: 20 TABLET | Refills: 0 | OUTPATIENT
Start: 2022-11-23 | End: 2022-11-30

## 2022-11-29 ENCOUNTER — HOSPITAL ENCOUNTER (OUTPATIENT)
Dept: NUCLEAR MEDICINE | Age: 52
Discharge: HOME | End: 2022-12-01
Payer: COMMERCIAL

## 2022-11-29 DIAGNOSIS — R11.0 NAUSEA: ICD-10-CM

## 2022-11-29 DIAGNOSIS — R10.13 DYSPEPSIA: ICD-10-CM

## 2022-11-29 DIAGNOSIS — R10.11 ABDOMINAL PAIN, RIGHT UPPER QUADRANT: ICD-10-CM

## 2022-11-29 PROCEDURE — 3430000000 HC RX DIAGNOSTIC RADIOPHARMACEUTICAL: Performed by: FAMILY MEDICINE

## 2022-11-29 PROCEDURE — 2709999900 NM HEPATOBILIARY SCAN W PHARMACOLOGICAL INTERVENTION

## 2022-11-29 PROCEDURE — A9537 TC99M MEBROFENIN: HCPCS | Performed by: FAMILY MEDICINE

## 2022-11-29 RX ADMIN — Medication 5.7 MILLICURIE: at 07:33

## 2022-11-30 ENCOUNTER — HOSPITAL ENCOUNTER (OUTPATIENT)
Dept: PHYSICAL THERAPY | Age: 52
Setting detail: THERAPIES SERIES
Discharge: HOME | End: 2022-11-30
Payer: COMMERCIAL

## 2022-11-30 PROCEDURE — 97035 APP MDLTY 1+ULTRASOUND EA 15: CPT

## 2022-11-30 PROCEDURE — 97162 PT EVAL MOD COMPLEX 30 MIN: CPT

## 2022-11-30 ASSESSMENT — PAIN DESCRIPTION - ORIENTATION: ORIENTATION: RIGHT

## 2022-11-30 ASSESSMENT — PAIN SCALES - GENERAL: PAINLEVEL_OUTOF10: 0

## 2022-11-30 ASSESSMENT — PAIN - FUNCTIONAL ASSESSMENT: PAIN_FUNCTIONAL_ASSESSMENT: PREVENTS OR INTERFERES WITH ALL ACTIVE AND SOME PASSIVE ACTIVITIES

## 2022-11-30 ASSESSMENT — PAIN DESCRIPTION - LOCATION: LOCATION: SHOULDER

## 2022-11-30 ASSESSMENT — PAIN DESCRIPTION - DESCRIPTORS: DESCRIPTORS: SHARP

## 2022-11-30 NOTE — PROGRESS NOTES
[address]     Ph: [phone]  Fax: [phone]      [] Certification  [] Recertification [x]  Plan of Care  [] Progress Note [] Discharge      Referring Provider: ZHANG Cordero     From:  Victoria Saenz PT  Patient: Ko Mercado (46 y.o. male) : 1970 Date: 2022  Medical Diagnosis: Strain of right rotator cuff capsule, initial encounter [S46.011A]       Treatment Diagnosis: R shoulder pain    Progress Report Period from:  2022  to 2022    Visits to Date: 1 No Show: 0 Cancelled Appts: 0    OBJECTIVE:   Long Term Goals - Time Frame for Long Term Goals : 4-6 weeks  Goals Current/ Discharge status Status   Long Term Goal 1: Full R shoulder AROM w/o pain to perform all reaching tasks   AROM RUE (degrees)  R Shoulder Flexion (0-180): <70 deg; pain  R Shoulder Extension (0-45): WNL  R Shoulder ABduction (0-180): <80 deg; pain  R Shoulder Int Rotation  (0-70): WNL in neutral  R Shoulder Ext Rotation (0-90): WNL in neutral  R Elbow Flexion (0-145): WNL  R Elbow Extension (145-0): WNL          PROM RUE (degrees)  R Shoulder Flex  (0-180): intermittent pain between  deg flexion  R Shoulder ABduction (0-180): ~90 deg  R Shoulder Ext Rotation  (0-90): 70 deg at 60 deg ABD; end range pain     New   Long Term Goal 2: R shoulder strength 5/5 w/o pain to resistance for improved lift, push, and pull activity tolerance Strength LLE  Strength LLE: WFL  Strength RUE  R Shoulder Flexion: 3-/5 (limited by pain)  R Shoulder Extension: 4+/5  R Shoulder ABduction: 3-/5 (limited by pain)  R Shoulder Internal Rotation: 4+/5  R Shoulder External Rotation: 4+/5 (pain reported)             New   Long Term Goal 3: Decreased R shoulder pain to < 2/10 with all ADLS, work, and recreational activity.  Pain Screening  Patient Currently in Pain: Yes  Pain Assessment: 0-10  Pain Level: 0  Best Pain Level: 0 (at rest)  Worst Pain Level: 10 (sharp pain with R shoulder AROM away from body)  Pain Location: Shoulder  Pain Orientation: Right  Pain Descriptors: Sharp  Functional Pain Assessment: Prevents or interferes with all active and some passive activities  Aggravating factors: Reaching, Lifting, Carrying   New   Long Term Goal 4: UEFI score > 75/80 Exam: UEFI: 40/80   New   Long Term Goal 5: [place] with HEP HEP to be started as appropriate    New     Body Structures, Functions, Activity Limitations Requiring Skilled Therapeutic Intervention: Increased pain, Decreased ROM, Decreased strength, Decreased ADL status, Decreased functional mobility , Decreased high-level IADLs, Decreased tolerance to work activity  Assessment: Pt is a 47 yo male presenting with R shoulder pain that impairs his ability to reach his R arm away from his body. Pt displays limitations in both passive and active ROM at this time secondary to pain. Pt able to produce strength in all planes of shoulder motion with noted recreation of pain during flex, ABD, and external rotation. Pt must be able to exercise, complete physical fitness tests, safely manage firearms for work, and complete other recreational tasks with his dominant R arm. Treatment started today with ultrasound to his anterior R shoulder to decrease soft tissue inflammation and pain. Plan to continue PT treatment 1-2x/wk working toward full pain free shoulder ROM and strength. Therapy Prognosis: Good      PT Education: PT Role;Plan of Care;Goals; Evaluative findings; Anatomy of condition (verbally discuss pain mgmt modalities including TENS, ultrasound, cold/warm compresses)    PLAN: [x] Evaluate and Treat  Frequency/Duration:  Plan Frequency: 1-2  Plan weeks: 4-6  Current Treatment Recommendations: Modalities, Integrated dry needling, Home exercise program, Patient/Caregiver education & training, ROM, Strengthening, Neuromuscular re-education, Manual Therapy - Soft Tissue Mobilization, Manual Therapy - Joint Manipulation, Pain management                           Patient Status:[x] Continue/ Initiate plan of Care    [] Discharge PT. Recommend pt continue with HEP. [] Additional visits requested, Please re-certify for additional visits:    [] Hold         Signature: Electronically signed by Gordon Zhao PT on 11/30/22 at 5:57 PM EST      If you have any questions or concerns, please don't hesitate to call. Thank you for your referral.    I have reviewed this plan of care and certify a need for medically necessary rehabilitation services.     Physician Signature:__________________________________________________________  Date:  Please sign and return

## 2022-11-30 NOTE — PROGRESS NOTES
[facility]  PHYSICAL THERAPY EVALUATION    Physical Therapy: Initial Evaluation    Patient: Dainiki Dallas [de-identified]46 y.o.     male)   Examination Date: 2022   :  1970 ;    ConfirmedRomana Chance MRN: [id number]  CSN: [id number]   Insurance: Payor: René Simpson [employer] / Plan: [address] [place] [address] / Product Type: *No Product type* /   Insurance ID: [id number] - ([facility]) Secondary Insurance (if applicable): René Simpson 150   Referring Physician: ZHANG Ferris       Visits to Date/Visits Approved:     No Show/Cancelled Appts: 0      Medical Diagnosis: Strain of right rotator cuff capsule, initial encounter [S46.011A]        Treatment Diagnosis: R shoulder pain     PERTINENT MEDICAL HISTORY   Patient Assessed for Rehabilitation Services: Yes       Medical History: Chart Reviewed: Yes   Past Medical History:   Diagnosis Date    Chronic back pain     GERD (gastroesophageal reflux disease)     Heartburn     HTN (hypertension)      Surgical History:   Past Surgical History:   Procedure Laterality Date    APPENDECTOMY      BACK SURGERY      COLONOSCOPY N/A 2021    COLORECTAL CANCER SCREENING, NOT HIGH RISK performed by Malia Johnson MD at [place] [address] ARTHROSCOPY Left 2020    ARTHROSCOPY LEFT KNEE, PARTIAL MEDIAL MENISECTOMY performed by Brooke James MD at [place] [address]      TONSILLECTOMY      UPPER GASTROINTESTINAL ENDOSCOPY      Hiatal hernia, normal stomach, normal duodenum       Medications:   Current Outpatient Medications:     ALPRAZolam (XANAX) 0.5 MG tablet, Take 1 tablet by mouth 3 times daily as needed for Sleep or Anxiety for up to 7 days. , Disp: 20 tablet, Rfl: 0    escitalopram (LEXAPRO) 10 MG tablet, Take 1 tablet by mouth daily, Disp: 30 tablet, Rfl: 3    amLODIPine (NORVASC) 5 MG tablet, Take 1 tab by mouth daily, Disp: 90 tablet, Rfl: 3    omeprazole (PRILOSEC) 20 MG delayed release capsule, Take one cap by mouth daily, Disp: 90 capsule, Rfl: 3  Allergies: Patient has no known allergies. SUBJECTIVE EXAMINATION     History obtained from[de-identified] Patient, Chart Review,      Family/Caregiver Present: No    Subjective History: Onset Date: 10/28/22  Subjective: Pt presents with R shoulder pain that limits his ability to raise his R arm away from his body. Pt reports pain started when training to complete his physical fitness test for work. Test includes push-ups, pull ups, sit ups and 1.5 mile run. Pt completed workout w/o issue and woke up the next day with severe pain and limitation in R shoulder that has persisted. Pt had injection from ortho on 11/11/22. Pt reports he had a negative reaction to the injection with steroid psychosis making him more anxious than normal. Pt has been unable to drive his sports car d/t inability to shift, complete physical training, or complete required work duties with firearm as a  d/t pain.   Additional Pertinent Hx (if applicable): cervical fusion, lumbar fusion   Prior diagnostic testing[de-identified] X-ray  Previous treatments prior to current episode?: Injections      Learning/Language: Learning  Does the patient/guardian have any barriers to learning?: No barriers  Will there be a co-learner?: No  What is the preferred language of the patient/guardian?: English  Is an  required?: No  How does the patient/guardian prefer to learn new concepts?: Listening, Reading, Demonstration, Pictures/Videos     Pain Screening    Pain Screening  Patient Currently in Pain: Yes  Pain Assessment: 0-10  Pain Level: 0  Best Pain Level: 0 (at rest)  Worst Pain Level: 10 (sharp pain with R shoulder AROM away from body)  Pain Location: Shoulder  Pain Orientation: Right  Pain Descriptors: Sharp  Functional Pain Assessment: Prevents or interferes with all active and some passive activities  Aggravating factors: Reaching, Lifting, Carrying    Functional Status    Social History:    Social History  Lives With: Family  Type of Home: House  Home Layout: One level    Occupation/Interests:   Occupation: Full time employment  Type of Occupation: Security  Leisure & Hobbies: exercise, track racing sports car    Prior Level of Function:     Independent        Current Level of Function:   Independent with modification d/t shoulder pain      ADL Assistance: Independent  Homemaking Assistance: Independent  Homemaking Responsibilities: Yes  Ambulation Assistance: Independent  Transfer Assistance: Independent  Active : Yes  Additional Comments: 50% PLOF reported    Dominant Hand: : Right    OBJECTIVE EXAMINATION     Review of Systems:  Follows Commands: Within Functional Limits    Observations:    Rounded shoulders    Palpation:   Right Shoulder Palpation: pain in bicipital groove    Left AROM  Right AROM          WNL    AROM RUE (degrees)  R Shoulder Flexion (0-180): <70 deg; pain  R Shoulder Extension (0-45): WNL  R Shoulder ABduction (0-180): <80 deg; pain  R Shoulder Int Rotation  (0-70): WNL in neutral  R Shoulder Ext Rotation (0-90): WNL in neutral  R Elbow Flexion (0-145):  WNL  R Elbow Extension (145-0): WNL        Left PROM  Right PROM          N/T    PROM RUE (degrees)  R Shoulder Flex  (0-180): intermittent pain between  deg flexion  R Shoulder ABduction (0-180): ~90 deg  R Shoulder Ext Rotation  (0-90): 70 deg at 60 deg ABD; end range pain        Left Strength  Right Strength         Strength LLE  Strength LLE: WFL    Strength RUE  R Shoulder Flexion: 3-/5 (limited by pain)  R Shoulder Extension: 4+/5  R Shoulder ABduction: 3-/5 (limited by pain)  R Shoulder Internal Rotation: 4+/5  R Shoulder External Rotation: 4+/5 (pain reported)       Outcomes Score:  Exam: UEFI: 40/80    Treatment:    Exercises:   Exercises  Exercise 1: *pulleys  Exercise 2: *shoulder isometrics  Exercise 3: *s/l shoulder ER  Exercise 4: *R shoulder PROM (respecting soft tissue restrictions) Modalities:  Modalities: Yes (*E-stim, *CP, *MHP, *IDN)  Ultrasound (CPT 73157)  Patient Position: Seated  Ultrasound location: Right, Shoulder (anterior side)  Ultrasound specified location: bicipital grove, anterior deltoid  Ultrasound frequency: 3 MHz  Ultrasound intensity (W/cm2):  (.8-1.0)  Ultrasound mode: Continuous  Ultrasound Parameters: 8 min application  Post treatment skin assessment: Intact     Manual:  Manual Therapy  Soft Tissue Mobilizaton: *R shoulder musculature     *Indicates exercise,modality, or manual techniques to be initiated when appropriate       ASSESSMENT     Impression: Assessment: Pt is a 45 yo male presenting with R shoulder pain that impairs his ability to reach his R arm away from his body. Pt displays limitations in both passive and active ROM at this time secondary to pain. Pt able to produce strength in all planes of shoulder motion with noted recreation of pain during flex, ABD, and external rotation. Pt must be able to exercise, complete physical fitness tests, safely manage firearms for work, and complete other recreational tasks with his dominant R arm. Treatment started today with ultrasound to his anterior R shoulder to decrease soft tissue inflammation and pain. Plan to continue PT treatment 1-2x/wk working toward full pain free shoulder ROM and strength. Body Structures, Functions, Activity Limitations Requiring Skilled Therapeutic Intervention: Increased pain, Decreased ROM, Decreased strength, Decreased ADL status, Decreased functional mobility , Decreased high-level IADLs, Decreased tolerance to work activity    Statement of Medical Necessity: Physical Therapy is both indicated and medically necessary as outlined in the POC to increase the likelihood of meeting the functionally related goals stated below.      Patient's Activity Tolerance: Patient tolerated evaluation without incident      Patient's rehabilitation potential/prognosis is considered to be: Good    Factors which may impact rehabilitation potential include: Chronicity of problem     Patient Education: PT Role, Plan of Care, Goals, Evaluative findings, Anatomy of condition (verbally discuss pain mgmt modalities including TENS, ultrasound, cold/warm compresses)      GOALS   Patient Goal(s): Patient Goals : \"fix my shoulder\"    Long Term Goals Completed by 4-6 weeks Goal Status   LTG 1 Full R shoulder AROM w/o pain to perform all reaching tasks New   LTG 2 R shoulder strength 5/5 w/o pain to resistance for improved lift, push, and pull activity tolerance New   LTG 3 Decreased R shoulder pain to < 2/10 with all ADLS, work, and recreational activity. New   LTG 4 UEFI score > 75/80 New   LTG 5 [place] with HEP  New        TREATMENT PLAN       Requires PT Follow-Up: Yes    Treatment may include any combination of the following: Modalities, Integrated dry needling, Home exercise program, Patient/Caregiver education & training, ROM, Strengthening, Neuromuscular re-education, Manual Therapy - Soft Tissue Mobilization, Manual Therapy - Joint Manipulation, Pain management     Frequency / Duration:  Patient to be seen 1-2 times per week for 4-6 weeks  Plan Comment:               Eval Complexity:   Decision Making: Medium Complexity  History: Personal Factors and/or Comorbidities Impacting POC: Medium  History: arthritis, stress, physical work demands, no pain relief with steroid injection  Examination of body system(s) including body structures and functions, activity limitations, and/or participation restrictions: Medium  Exam: UEFI: 40/80  Clinical Presentation: Medium  Clinical Presentation: evolving    POST-PAIN     Pain Rating (0-10 pain scale):   0/10  (at rest)  Location and pain description same as pre-treatment unless indicated. Action: [x] NA  [] Call Physician  [] Perform HEP  [] Meds as prescribed    Evaluation and patient rights have been reviewed and patient agrees with plan of care.   Yes  [x] No  []   Explain:     Beatriz Fall Risk Assessment  Risk Factor Scale  Score   History of Falls [] Yes  [x] No 25  0    Secondary Diagnosis [] Yes  [x] No 15  0    Ambulatory Aid [] Furniture  [] Crutches/cane/walker  [x] None/bedrest/wheelchair/nurse 30  15  0    IV/Heparin Lock [] Yes  [x] No 20  0    Gait/Transferring [] Impaired  [] Weak  [x] Normal/bedrest/immobile 20  10  0    Mental Status [] Forgets limitations  [x] Oriented to own ability 15  0       Total: 0     Based on the Assessment score: check the appropriate box.   [x]  No intervention needed   Low =   Score of 0-24  []  Use standard prevention interventions Moderate =  Score of 24-44   [] Discuss fall prevention strategies   [] Indicate moderate falls risk on eval  []  Use high risk prevention interventions High = Score of 45 and higher   [] Discuss fall prevention strategies   [] Provide supervision during treatment time      Minutes:  PT Individual Minutes  Time In: 1500  Time Out: 1555  Minutes: 55  Timed Code Treatment Minutes: 8 Minutes  Procedure Minutes: 45 min eval     Timed Activity Minutes Units   Ultrasound  8 1     Electronically signed by Vera Young, PT on 11/30/22 at 4:13 PM EST

## 2022-12-02 ENCOUNTER — OFFICE VISIT (OUTPATIENT)
Dept: ORTHOPEDIC SURGERY | Age: 52
End: 2022-12-02
Payer: COMMERCIAL

## 2022-12-02 DIAGNOSIS — R10.13 DYSPEPSIA: Primary | ICD-10-CM

## 2022-12-02 DIAGNOSIS — S46.011D STRAIN OF RIGHT ROTATOR CUFF CAPSULE, SUBSEQUENT ENCOUNTER: Primary | ICD-10-CM

## 2022-12-02 PROCEDURE — 99214 OFFICE O/P EST MOD 30 MIN: CPT | Performed by: PHYSICIAN ASSISTANT

## 2022-12-02 RX ORDER — ALPRAZOLAM 0.5 MG/1
0.5 TABLET ORAL NIGHTLY PRN
COMMUNITY

## 2022-12-02 ASSESSMENT — ENCOUNTER SYMPTOMS: RESPIRATORY NEGATIVE: 1

## 2022-12-02 NOTE — PROGRESS NOTES
Feliberto Dejesus (:  1970) is a 46 y.o. male,Established patient, here for evaluation of the following chief complaint(s):  Follow-up (Follow up visit for Strain of right rotator cuff capsule)         ASSESSMENT/PLAN:  1. Strain of right rotator cuff capsule, subsequent encounter    No follow-ups on file. Subjective   SUBJECTIVE/OBJECTIVE:  This is a 27-year-old right-hand-dominant male following up for complaint of right shoulder pain. He was performing some pull-ups previously and injured his right shoulder. He is having difficulty raising his arm above shoulder level and was not moderate pain. I injected the shoulder with cortisone at his last visit and he states his pain entirely resolved by the time he got to the parking lot. States she did have anxiety reaction after the cortisone injection. He states he is handling that with his primary care physician. States the arm is doing well until he goes to reach above shoulder level. He has had an initial evaluation of physical therapy. He has been taking over-the-counter anti-inflammatory medications with pain relief. Review of Systems   Constitutional: Negative. HENT: Negative. Respiratory: Negative. Skin: Negative. Neurological: Negative. Objective   Physical Exam  Musculoskeletal:      Comments: Right shoulder-no acromioclavicular, clavicle, SC joint tenderness with palpation. Painful abduction of the right arm above shoulder level. Internal rotation is 0 degrees, external rotation is 120 degrees about 10 degrees less than the left. Supraspinatus test elicits painful weakness. Apprehension sign is negative. Biceps contour is normal.     Splane to the patient does have rotator cuff injury. I believe this to be an interstitial type tearing and set up a complete tear. Injected him previously with cortisone. He will follow-up with me in about a month after his had more visits to physical therapy.   He may continue taking his anti-inflammatory medications. On this date 12/2/2022 I have spent 33 minutes reviewing previous notes, test results and face to face with the patient discussing the diagnosis and importance of compliance with the treatment plan as well as documenting on the day of the visit. An electronic signature was used to authenticate this note.     --ZHANG Teresa

## 2022-12-06 ENCOUNTER — OFFICE VISIT (OUTPATIENT)
Dept: FAMILY MEDICINE CLINIC | Age: 52
End: 2022-12-06
Payer: COMMERCIAL

## 2022-12-06 VITALS
DIASTOLIC BLOOD PRESSURE: 82 MMHG | SYSTOLIC BLOOD PRESSURE: 124 MMHG | TEMPERATURE: 97.7 F | WEIGHT: 223.4 LBS | HEART RATE: 58 BPM | OXYGEN SATURATION: 99 % | HEIGHT: 74 IN | BODY MASS INDEX: 28.67 KG/M2

## 2022-12-06 DIAGNOSIS — F41.9 ANXIETY: Primary | ICD-10-CM

## 2022-12-06 DIAGNOSIS — I10 ESSENTIAL HYPERTENSION: ICD-10-CM

## 2022-12-06 PROCEDURE — 3078F DIAST BP <80 MM HG: CPT | Performed by: FAMILY MEDICINE

## 2022-12-06 PROCEDURE — 3074F SYST BP LT 130 MM HG: CPT | Performed by: FAMILY MEDICINE

## 2022-12-06 PROCEDURE — 99213 OFFICE O/P EST LOW 20 MIN: CPT | Performed by: FAMILY MEDICINE

## 2022-12-06 RX ORDER — HYDROXYZINE HYDROCHLORIDE 25 MG/1
25 TABLET, FILM COATED ORAL EVERY 8 HOURS PRN
Qty: 30 TABLET | Refills: 0 | Status: SHIPPED | OUTPATIENT
Start: 2022-12-06 | End: 2022-12-16

## 2022-12-06 RX ORDER — ESCITALOPRAM OXALATE 20 MG/1
20 TABLET ORAL DAILY
Qty: 30 TABLET | Refills: 3 | Status: SHIPPED | OUTPATIENT
Start: 2022-12-06

## 2022-12-06 NOTE — PROGRESS NOTES
Subjective:      Chief Complaint   Patient presents with    Anxiety     Needs short term disability and FMLA filled out   Discuss Jackiex         Eddy Farmer is a 46 y.o. male     Anxiety/insomnia  Still having some RUQ pain    Upsetting that still having anxiety   Seems to have social anxiety   Agoraphobia    Relying on xanax to get through things    Wt Readings from Last 3 Encounters:   12/06/22 223 lb 6.4 oz (101.3 kg)   11/17/22 221 lb (100.2 kg)   11/11/22 221 lb (100.2 kg)         Past Medical History:   Diagnosis Date    Chronic back pain     GERD (gastroesophageal reflux disease)     Heartburn     HTN (hypertension)      Past Surgical History:   Procedure Laterality Date    APPENDECTOMY      BACK SURGERY      COLONOSCOPY N/A 4/16/2021    COLORECTAL CANCER SCREENING, NOT HIGH RISK performed by Tai Pagan MD at [place] [address] ARTHROSCOPY Left 9/23/2020    ARTHROSCOPY LEFT KNEE, PARTIAL MEDIAL MENISECTOMY performed by Xander Kingston MD at [place] [address]  2005    TONSILLECTOMY      UPPER GASTROINTESTINAL ENDOSCOPY  2008    Hiatal hernia, normal stomach, normal duodenum     Family History   Problem Relation Age of Onset    Crohn's Disease Brother     High Blood Pressure Father     Diabetes Father     Coronary Art Dis Neg Hx      Social History     Socioeconomic History    Marital status:      Spouse name: None    Number of children: None    Years of education: None    Highest education level: None   Tobacco Use    Smoking status: Former     Packs/day: 0.00     Years: 5.00     Pack years: 0.00     Types: Cigarettes     Quit date: 1/1/2007     Years since quitting: 15.9    Smokeless tobacco: Never    Tobacco comments:     Vapor   Vaping Use    Vaping Use: Every day    Substances: Always   Substance and Sexual Activity    Alcohol use:  Yes     Alcohol/week: 0.0 standard drinks     Comment: occasionally    Drug use: No     Social Determinants of Health Financial Resource Strain: Low Risk     Difficulty of Paying Living Expenses: Not hard at all   Food Insecurity: No Food Insecurity    Worried About Running Out of Food in the Last Year: Never true    Ran Out of Food in the Last Year: Never true   Transportation Needs: No Transportation Needs    Lack of Transportation (Medical): No    Lack of Transportation (Non-Medical): No     Current Outpatient Medications   Medication Sig Dispense Refill    escitalopram (LEXAPRO) 20 MG tablet Take 1 tablet by mouth daily 30 tablet 3    hydrOXYzine HCl (ATARAX) 25 MG tablet Take 1 tablet by mouth every 8 hours as needed for Itching 30 tablet 0    ALPRAZolam (XANAX) 0.5 MG tablet Take 0.5 mg by mouth nightly as needed for Sleep. amLODIPine (NORVASC) 5 MG tablet Take 1 tab by mouth daily 90 tablet 3    omeprazole (PRILOSEC) 20 MG delayed release capsule Take one cap by mouth daily 90 capsule 3     No current facility-administered medications for this visit. No Known Allergies      Review of Systems:   General ROS: weight loss   Respiratory ROS: no cough, shortness of breath, or wheezing  Cardiovascular ROS: no chest pain or dyspnea on exertion  Gastrointestinal ROS: no abdominal pain, change in bowel habits, or black or bloody stools  Genito-Urinary ROS: no dysuria, trouble voiding, or hematuria  Musculoskeletal ROS: chronic low back pain  Neurological ROS: negative for - behavioral changes, memory loss, numbness/tingling, tremors or weakness    Exercise: walking at work, not really with extra exercise    In general patient otherwise reports feeling well.        Objective:     Physical Exam:  /82 (Site: Right Upper Arm)   Pulse 58   Temp 97.7 °F (36.5 °C)   Ht 6' 2\" (1.88 m)   Wt 223 lb 6.4 oz (101.3 kg)   SpO2 99%   BMI 28.68 kg/m²       Gen: Well, NAD, Alert, Oriented x 3   HEENT: EOMI, eyes clear, MMM  Skin: without rash or jaundice  Neck: no significant lymphadenopathy or thyromegaly  Lungs: CTA B w/out Rales/Wheezes/Rhonchi, Good respiratory effort   Heart: RRR, S1S2, w/out M/R/G, non-displaced PMI   Neuro: Neurovascularly intact w/ Sensory/Motor intact UE/LE Bilaterally. Psych: some anxiety     Assessment:      Diagnosis Orders   1. Anxiety  escitalopram (LEXAPRO) 20 MG tablet    Amb External Referral To Psychiatry    hydrOXYzine HCl (ATARAX) 25 MG tablet      2.  Essential hypertension                 Plan:       Check thyroid studies     Xanax pr, call for refill   Lexapro to 20mg    Hydroxyzine PRN    Psychiatry referral    Has been off work since 11/16    Will need to pursue short term FMLA  Maximum of 26 weeks    For now will remain out until can see psychiatry and hopefully optimize medication regimen to help anxiety             Devora Spurling, MD

## 2022-12-07 ENCOUNTER — HOSPITAL ENCOUNTER (OUTPATIENT)
Dept: PHYSICAL THERAPY | Age: 52
Setting detail: THERAPIES SERIES
Discharge: HOME | End: 2022-12-07
Payer: COMMERCIAL

## 2022-12-07 PROCEDURE — 97110 THERAPEUTIC EXERCISES: CPT

## 2022-12-07 NOTE — PROGRESS NOTES
[facility]  Outpatient Physical Therapy    Treatment Note        Date: 2022  Patient: Cathy Dejesus  : 1970   Confirmed: Yes  MRN: [id number]  Referring Provider: ZHANG Montalvo    Medical Diagnosis: Strain of right rotator cuff capsule, initial encounter [S46.011A]       Treatment Diagnosis: R shoulder pain    Visit Information:  Insurance: Payor: René Simpson [employer] / Plan: [address] [place] [address] / Product Type: *No Product type* /   PT Visit Information  Onset Date: 10/28/22  PT Insurance Information: BCBS  Total # of Visits Approved: 60  Total # of Visits to Date: 2  No Show: 0  Canceled Appointment: 0  Progress Note Counter: 1/10    Subjective Information:  Subjective: Pt has no new reports regarding R shoulder at this time. States he still has the some complaints from eval where any movements above his head are difficult. HEP Compliance:  [] Good [] Fair [] Poor [x] Reports not doing due to: No HEP given on eval    Pain Screening  Patient Currently in Pain: Denies    Treatment:  Exercises:  Exercises  Exercise 1: pulleys flx x4 min  Exercise 2: shoulder isometrics flx, ext, abduction x10\" x5 ea  Exercise 4: R shoulder PROM flx, abd, ER  (respecting soft tissue restrictions)  Exercise 5: table slides flx, scaption 5\" hold x10 ea (unable to perform abduction)       *Indicates exercise, modality, or manual techniques to be initiated when appropriate    Objective Measures:       Strength: [x] NT  [] MMT completed:      ROM: [x] NT  [] ROM measurements:      Assessment: Body Structures, Functions, Activity Limitations Requiring Skilled Therapeutic Intervention: Increased pain, Decreased ROM, Decreased strength, Decreased ADL status, Decreased functional mobility , Decreased high-level IADLs, Decreased tolerance to work activity  Assessment: Pt demonstrates improvement toward goals secondary to tolerating all exercises this session without increased pain.  Mild fatigue noted with flx isometrics, none reported with abduction/extension. Mild discomfort also noted with flexion PROM on R shoulder at ~90*, no discomfort noted with abduction/ER. Emphasis on ROM this session with increased time during table slides/ PROM. HEP handout provided including isometrics as well as flx table slides. Pt reports performing modalites at home and declines at this time. Trialing without US this session as pt would like to see if there is a difference from previous tx to next tx. Recommend pt perform HEP for continued improvement toward goals. Treatment Diagnosis: R shoulder pain  Therapy Prognosis: Good    Post-Pain Assessment:       Pain Rating (0-10 pain scale):   0/10   Location and pain description same as pre-treatment unless indicated. Action: [] NA   [x] Perform HEP  [] Meds as prescribed  [x] Modalities as prescribed   [] Call Physician     GOALS   Patient Goal(s): Patient Goals : \"fix my shoulder\"    Short Term Goals Completed by   Goal Status       Long Term Goals Completed by 4-6 weeks Goal Status   LTG 1 Full R shoulder AROM w/o pain to perform all reaching tasks In progress   LTG 2 R shoulder strength 5/5 w/o pain to resistance for improved lift, push, and pull activity tolerance In progress   LTG 3 Decreased R shoulder pain to < 2/10 with all ADLS, work, and recreational activity. In progress   LTG 4 UEFI score > 75/80 In progress   LTG 5 [place] with HEP In progress     Plan:  Frequency/Duration:  Plan  Plan Frequency: 1-2  Plan weeks: 4-6  Current Treatment Recommendations: Modalities, Dry needling, Home exercise program, Patient/Caregiver education & training, ROM, Strengthening, Neuromuscular re-education, Manual, Pain management  Pt to continue current HEP. See objective section for any therapeutic exercise changes, additions or modifications this date.     Therapy Time:      PT Individual Minutes  Time In: 1502  Time Out: 8642  Minutes: 39  Timed Code Treatment Minutes: 39 Minutes  Procedure Minutes:0  Timed Activity Minutes Units   Ther Ex 39 3     Electronically signed by Ruby Lowry PTA on 12/7/22 at 3:03 PM EST

## 2022-12-09 ENCOUNTER — PATIENT MESSAGE (OUTPATIENT)
Dept: FAMILY MEDICINE CLINIC | Age: 52
End: 2022-12-09

## 2022-12-09 ENCOUNTER — HOSPITAL ENCOUNTER (OUTPATIENT)
Dept: PHYSICAL THERAPY | Age: 52
Setting detail: THERAPIES SERIES
Discharge: HOME | End: 2022-12-09
Payer: COMMERCIAL

## 2022-12-09 DIAGNOSIS — F41.9 ANXIETY: Primary | ICD-10-CM

## 2022-12-09 PROCEDURE — 97035 APP MDLTY 1+ULTRASOUND EA 15: CPT

## 2022-12-09 PROCEDURE — 97140 MANUAL THERAPY 1/> REGIONS: CPT

## 2022-12-09 PROCEDURE — 97110 THERAPEUTIC EXERCISES: CPT

## 2022-12-09 RX ORDER — ALPRAZOLAM 0.5 MG/1
0.5 TABLET ORAL NIGHTLY PRN
Qty: 21 TABLET | Refills: 0 | Status: SHIPPED | OUTPATIENT
Start: 2022-12-09 | End: 2022-12-16

## 2022-12-09 ASSESSMENT — PAIN DESCRIPTION - LOCATION: LOCATION: SHOULDER

## 2022-12-09 ASSESSMENT — PAIN DESCRIPTION - ORIENTATION: ORIENTATION: RIGHT

## 2022-12-09 NOTE — PROGRESS NOTES
[facility]  Outpatient Physical Therapy    Treatment Note        Date: 2022  Patient: Favio Dejesus  : 1970   Confirmed: Yes  MRN: [id number]  Referring Provider: ZHANG Johnson    Medical Diagnosis: Strain of right rotator cuff capsule, initial encounter [S46.011A]       Treatment Diagnosis: R shoulder pain    Visit Information:  Insurance: Payor: Juan Carlos Barr / Plan: [address] [place] [address] / Product Type: *No Product type* /   PT Visit Information  Onset Date: 10/28/22  PT Insurance Information: BCBS  Total # of Visits Approved: 60  Total # of Visits to Date: 3  No Show: 0  Canceled Appointment: 0  Progress Note Counter: 3/10    Subjective Information:  Subjective: Sharp pain in R shoulder when extending arm away from his body. No pain at rest.  HEP Compliance:  [x] Good [] Fair [] Poor [] Reports not doing due to:    Pain Screening  Patient Currently in Pain: Denies  Pain Location: Shoulder  Pain Orientation: Right    Treatment:  Exercises:  Exercises  Exercise 1: pulleys (attempted, defered d/t pain)  Exercise 2: R shoulder PROM w/ light GHJ distraction x 15 min (flex, ABD, ER)  Exercise 3: verbally reviewed HEP and current handouts  Exercise 4: *trial light shoulder t-band in neutral  Exercise 5: *cane shoulder flex, supine, underhand        Manual:    See exercise 2       Modalities:  Ultrasound (CPT 81301)  Patient Position: Seated  Ultrasound location: Right, Shoulder  Ultrasound specified location: anterior and posterior shoulder capsule  Ultrasound frequency: 3 MHz  Ultrasound intensity (W/cm2): 1  Ultrasound mode: Continuous  Ultrasound Parameters: 9 application  Post treatment skin assessment: Intact       *Indicates exercise, modality, or manual techniques to be initiated when appropriate    Objective Measures:     Strength: [] NT  [] MMT completed:      ROM: [] NT  [] ROM measurements: see assessment below      Assessment:    Body Structures, Functions, Activity Limitations Requiring Skilled Therapeutic Intervention: Increased pain, Decreased ROM, Decreased strength, Decreased ADL status, Decreased functional mobility , Decreased high-level IADLs, Decreased tolerance to work activity  Assessment: R shoulder pain still reported with P/AROM between  deg of elevation in flexion and abduction planes. Pain reported along anterior and posterior aspects of shoulder. Pain reported with resisted shoulder ER, ABD, and flexion when arm is in neutral, pain free position. Continued use of ultrasound for healing of deeper RC tissues. Revisited HEP with further education on keeping all exercises within comfortable intensity. Shoulder pain limits activity progression at this time. Will continue to reassess at next visit for plausible progression in treatment. Treatment Diagnosis: R shoulder pain  Therapy Prognosis: Good          Post-Pain Assessment:       Pain Rating (0-10 pain scale):  0 /10   Location and pain description same as pre-treatment unless indicated. Action: [x] NA   [] Perform HEP  [] Meds as prescribed  [] Modalities as prescribed   [] Call Physician     GOALS   Patient Goal(s): Patient Goals : \"fix my shoulder\"    Long Term Goals Completed by 4-6 weeks Goal Status   LTG 1 Full R shoulder AROM w/o pain to perform all reaching tasks In progress   LTG 2 R shoulder strength 5/5 w/o pain to resistance for improved lift, push, and pull activity tolerance In progress   LTG 3 Decreased R shoulder pain to < 2/10 with all ADLS, work, and recreational activity. In progress   LTG 4 UEFI score > 75/80 In progress   LTG 5 [place] with HEP In progress          Plan:  Frequency/Duration:  Plan  Plan Frequency: 1-2  Plan weeks: 4-6  Current Treatment Recommendations: Modalities, Dry needling, Home exercise program, Patient/Caregiver education & training, ROM, Strengthening, Neuromuscular re-education, Manual, Pain management  Pt to continue current HEP.   See objective section for any therapeutic exercise changes, additions or modifications this date.     Therapy Time:      PT Individual Minutes  Time In: 1500  Time Out: 1540  Minutes: 40  Timed Code Treatment Minutes: 40 Minutes  Procedure Minutes: 0  Timed Activity Minutes Units   Ther Ex 16 1   Ultrasound 9 1   Manual  15 1     Electronically signed by Leonid Tran PT on 12/9/22 at 4:19 PM EST

## 2022-12-09 NOTE — TELEPHONE ENCOUNTER
From: Feliberto Dejesus  To: Dr. El Shin: 12/9/2022 8:33 AM EST  Subject: Xanax refill    Requesting a refill for Xanax, please send to [facility].  I have been taking the prescribed amount and need a refill  Thanks  Holland Willis

## 2022-12-12 NOTE — TELEPHONE ENCOUNTER
Orders Placed This Encounter   Medications    DISCONTD: ALPRAZolam (XANAX) 0.5 MG tablet     Sig: Take 1 tablet by mouth nightly as needed for Sleep or Anxiety for up to 7 days. Dispense:  21 tablet     Refill:  0    ALPRAZolam (XANAX) 0.5 MG tablet     Sig: Take 1 tablet by mouth 3 times daily as needed for Sleep or Anxiety for up to 7 days. Dispense:  21 tablet     Refill:  0       The above med(s) were e-scripted to the patient's pharmacy.    Please advise patient  Lynne Hickman MD

## 2022-12-14 ENCOUNTER — APPOINTMENT (OUTPATIENT)
Dept: PHYSICAL THERAPY | Age: 52
End: 2022-12-14
Payer: COMMERCIAL

## 2022-12-14 PROCEDURE — 97110 THERAPEUTIC EXERCISES: CPT

## 2022-12-14 PROCEDURE — 97035 APP MDLTY 1+ULTRASOUND EA 15: CPT

## 2022-12-14 PROCEDURE — 97140 MANUAL THERAPY 1/> REGIONS: CPT

## 2022-12-14 NOTE — PROGRESS NOTES
[facility]  Outpatient Physical Therapy    Treatment Note        Date: 2022  Patient: Destiny Dejesus  : 1970   Confirmed: Yes  MRN: [id number]  Referring Provider: ZHANG Leonard    Medical Diagnosis: Strain of right rotator cuff capsule, initial encounter [S46.011A]       Treatment Diagnosis: R shoulder pain    Visit Information:  Insurance: Payor: Yoselin Ana / Plan: [address] [place] [address] / Product Type: *No Product type* /   PT Visit Information  Onset Date: 10/28/22  PT Insurance Information: BCBS  Total # of Visits Approved: 60  Total # of Visits to Date: 4  No Show: 0  Canceled Appointment: 0  Progress Note Counter: 4/10    Subjective Information:  Subjective: Pt reports no pain at restbut reaching uo catches and 10/10 sharp. States moving better. HEP Compliance:  [x] Good [] Fair [] Poor [] Reports not doing due to:    Pain Screening  Patient Currently in Pain: Denies    Treatment:  Exercises:  Exercises  Exercise 1: pulleysx 4 minutes  Exercise 2: R shoulder PROM w/ light GHJ distraction x 15 min (flex, ABD, ER)  Exercise 5: cane shoulder flex, supine, underhand  3 sec x 10  Exercise 6: ER & IR shoulder YTB x 15  Exercise 7: Rythmic stab. 30 sec x 3  Exercise 8: Plank position stading at table. wt shift and reach away x 10       Manual:   Manual Therapy  Joint Mobilization: GHJ grd 1-2  Soft Tissue Mobilizaton: R shoulder musculature anterior/posterior total manual 14 minutes  Other: PROM flex, abd ER with distraction.        Modalities:  Ultrasound (CPT 70548)  Patient Position: Seated  Ultrasound location: Right, Shoulder  Ultrasound specified location: anterior and posterior shoulder capsule  Ultrasound frequency: 3 MHz  Ultrasound intensity (W/cm2): 1  Ultrasound mode: Continuous  Ultrasound Parameters: 9 application  Post treatment skin assessment: Intact       *Indicates exercise, modality, or manual techniques to be initiated when appropriate    Objective Measures: Strength: [x] NT  [] MMT completed:     ROM: [] NT  [x] ROM measurements:       PROM RUE (degrees)  R Shoulder Flex  (0-180): WFL Flex with occasional; catch at 60-80 degrees,  R Shoulder ABduction (0-180): 130*  R Shoulder Ext Rotation  (0-90): WFL at 60 degrees. Assessment: Body Structures, Functions, Activity Limitations Requiring Skilled Therapeutic Intervention: Increased pain, Decreased ROM, Decreased strength, Decreased ADL status, Decreased functional mobility , Decreased high-level IADLs, Decreased tolerance to work activity  Assessment: Pt reports moving better, able to shift car gears now and reach higher. Continue to progress exercises with focus on stabilizing Right GHJ. PROM improved today. declined cold pack, doing HP & TENS at home. Treatment Diagnosis: R shoulder pain  Therapy Prognosis: Good          Post-Pain Assessment:       Pain Rating (0-10 pain scale):   /10   Location and pain description same as pre-treatment unless indicated. Action: [] NA   [] Perform HEP  [] Meds as prescribed  [] Modalities as prescribed   [] Call Physician     GOALS   Patient Goal(s): Patient Goals : \"fix my shoulder\"    Long Term Goals Completed by 4-6 weeks Goal Status   LTG 1 Full R shoulder AROM w/o pain to perform all reaching tasks In progress   LTG 2 R shoulder strength 5/5 w/o pain to resistance for improved lift, push, and pull activity tolerance In progress   LTG 3 Decreased R shoulder pain to < 2/10 with all ADLS, work, and recreational activity. In progress   LTG 4 UEFI score > 75/80 In progress   LTG 5 [place] with HEP In progress          Plan:  Frequency/Duration:  Plan  Plan Frequency: 1-2  Plan weeks: 4-6  Current Treatment Recommendations: Modalities, Dry needling, Home exercise program, Patient/Caregiver education & training, ROM, Strengthening, Neuromuscular re-education, Manual, Pain management  Pt to continue current HEP.   See objective section for any therapeutic exercise changes, additions or modifications this date.     Therapy Time:      PT Individual Minutes  Time In: 1500  Time Out: 3655  Minutes: 38  Timed Code Treatment Minutes: 38 Minutes  Procedure Minutes:0    Timed Activity Minutes Units   Ther Ex 16 1   US 8 1   Manual  14 1     Electronically signed by Marissa Chacon PTA on 12/14/22 at 4:05 PM EST

## 2022-12-15 ENCOUNTER — OFFICE VISIT (OUTPATIENT)
Dept: GASTROENTEROLOGY | Age: 52
End: 2022-12-15
Payer: COMMERCIAL

## 2022-12-15 VITALS — HEART RATE: 61 BPM | OXYGEN SATURATION: 100 % | BODY MASS INDEX: 28.49 KG/M2 | WEIGHT: 222 LBS | HEIGHT: 74 IN

## 2022-12-15 DIAGNOSIS — R10.13 DYSPEPSIA: ICD-10-CM

## 2022-12-15 DIAGNOSIS — K21.9 GASTROESOPHAGEAL REFLUX DISEASE, UNSPECIFIED WHETHER ESOPHAGITIS PRESENT: Primary | ICD-10-CM

## 2022-12-15 PROCEDURE — 99203 OFFICE O/P NEW LOW 30 MIN: CPT | Performed by: INTERNAL MEDICINE

## 2022-12-15 NOTE — PROGRESS NOTES
Gastroenterology Clinic Visit    Des Winter  [id number]  Chief Complaint   Patient presents with    New Patient    Nausea     HPI: 46 y.o. male presents to the clinic with longstanding symptoms of upper GI further questioning patient reports symptoms starting in January 2022 with daily dry heaving and nausea, symptoms occurred on an empty stomach. He reports making significant changes to his diet, is eating better with intentional weight loss of about 80 to 90 pounds over the last 12 months. He reports secondary to work. But he enjoys his work and looks forward to. No history of gastroesophageal reflux, has been on omeprazole 40 mg for last 15 years. He does endorse his symptoms being related to stress. He was recently started on Xanax, which have completely resolved his symptoms. He denies any hematemesis, hematochezia  He reports having worsening of his symptoms after having a steroid injection to his shoulder. 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 Previous GI work up/Endoscopic investigations:   Colonoscopy: 4/16/2021    Review of Systems   All other systems reviewed and are negative.      Past Medical History:   Diagnosis Date    Chronic back pain     GERD (gastroesophageal reflux disease)     Heartburn     HTN (hypertension)      Past Surgical History:   Procedure Laterality Date    APPENDECTOMY      BACK SURGERY      COLONOSCOPY N/A 4/16/2021    COLORECTAL CANCER SCREENING, NOT HIGH RISK performed by Rylee Ramos MD at [facility] ARTHROSCOPY Left 9/23/2020    ARTHROSCOPY LEFT KNEE, PARTIAL MEDIAL MENISECTOMY performed by Dior Victoria MD at [facility] 25  2005    TONSILLECTOMY      UPPER GASTROINTESTINAL ENDOSCOPY  2008    Hiatal hernia, normal stomach, normal duodenum     Current Outpatient Medications on File Prior to Visit   Medication Sig Dispense Refill    ALPRAZolam (XANAX) 0.5 MG tablet Take 1 tablet by mouth 3 times daily as needed for Sleep or Anxiety for up to 7 days. 21 tablet 0    escitalopram (LEXAPRO) 20 MG tablet Take 1 tablet by mouth daily 30 tablet 3    amLODIPine (NORVASC) 5 MG tablet Take 1 tab by mouth daily 90 tablet 3    omeprazole (PRILOSEC) 20 MG delayed release capsule Take one cap by mouth daily 90 capsule 3     No current facility-administered medications on file prior to visit. Family History   Problem Relation Age of Onset    High Blood Pressure Father     Diabetes Father     Crohn's Disease Brother     Coronary Art Dis Neg Hx     Colon Cancer Neg Hx      Social History     Socioeconomic History    Marital status:    Tobacco Use    Smoking status: Former     Packs/day: 0.00     Years: 5.00     Pack years: 0.00     Types: Cigarettes     Quit date: 1/1/2007     Years since quitting: 15.9    Smokeless tobacco: Never    Tobacco comments:     Vapor   Vaping Use    Vaping Use: Every day    Substances: Always   Substance and Sexual Activity    Alcohol use: Yes     Alcohol/week: 0.0 standard drinks     Comment: occasionally    Drug use: No     Social Determinants of Health     Financial Resource Strain: Low Risk     Difficulty of Paying Living Expenses: Not hard at all   Food Insecurity: No Food Insecurity    Worried About Running Out of Food in the Last Year: Never true    Ran Out of Food in the Last Year: Never true   Transportation Needs: No Transportation Needs    Lack of Transportation (Medical): No    Lack of Transportation (Non-Medical): No       Pulse 61, height 6' 2\" (1.88 m), weight 222 lb (100.7 kg), SpO2 100 %. Physical Exam  Constitutional:       General: He is not in acute distress. Appearance: Normal appearance. He is well-developed. Eyes:      General: No scleral icterus. Cardiovascular:      Rate and Rhythm: Normal rate and regular rhythm. Pulmonary:      Effort: Pulmonary effort is normal.      Breath sounds: Normal breath sounds.    Abdominal:      General: Bowel sounds are normal. There is no distension. Palpations: Abdomen is soft. There is no mass. Tenderness: There is no abdominal tenderness. There is no guarding or rebound. Musculoskeletal:         General: Normal range of motion. Lymphadenopathy:      Cervical: No cervical adenopathy. Neurological:      Mental Status: He is alert and oriented to person, place, and time. Psychiatric:         Behavior: Behavior normal.         Thought Content: Thought content normal.         Judgment: Judgment normal.       Laboratory, Pathology, Radiology reviewed indetail with relevant important investigations summarized below:  Lab Results   Component Value Date    WBC 6.9 09/27/2022    HGB 14.6 09/27/2022    HCT 42.9 09/27/2022    MCV 90.6 09/27/2022     09/27/2022     No results found for: IRON, TIBC, FERRITIN  No results found for: Roxine [place]   No results found for: FOLATE  Lab Results   Component Value Date    LABALBU 5.2 (H) 09/27/2022      Lab Results   Component Value Date    ALT 15 09/27/2022    AST 16 09/27/2022    ALKPHOS 61 09/27/2022    BILITOT 1.6 (H) 09/27/2022     Ultrasound Result (most recent):  US GALLBLADDER RUQ 05/18/2022    Narrative  EXAMINATION: US GALLBLADDER RUQ    CLINICAL HISTORY: EPIGASTRIC ABDOMINAL PAIN    FINDINGS: Liver normal in size, shape, and echogenicity. No intrahepatic and no extrahepatic ductal dilatation. Common duct measures 4 mm. Color flow without anomaly. Gallbladder contains no shadowing and no echogenic foci. No para cholecystic fluid. No  gallbladder wall thickening. Pancreas obscured by overlying bowel gas. Impression  NEGATIVE RIGHT UPPER QUADRANT ULTRASOUND. Assessment and Plan:  46 y.o. male with symptoms consistent with dyspepsia.     1. Gastroesophageal reflux disease, unspecified whether esophagitis present  - Lifestyle modification with stress reduction, relaxation techniques discussed in detail.  - Dietary changes discussed  -Consultation with psychologist  - Antireflux measures, continue with PPI therapy  - EGD other etiology and screening for Rodrigez's given history of longstanding gastroesophageal reflux    Follow-up to be scheduled based on endoscopic evaluation and findings    Joseph Jackson MD   Staff Gastroenterologist  [facility]    Please note this report has been partially produced using speech recognition software and may cause or contain errors related to thatsystem including grammar, punctuation and spelling as well as words and phrases that may seem inappropriate. If there are questions or concerns please feel free to contact me to clarify.

## 2022-12-16 ENCOUNTER — APPOINTMENT (OUTPATIENT)
Dept: PHYSICAL THERAPY | Age: 52
End: 2022-12-16
Payer: COMMERCIAL

## 2022-12-16 NOTE — PROGRESS NOTES
Therapy                            Cancellation/No-show Note    Date: 2022  Patient: aSbrina Montes De Oca (46 y.o. male)  : 1970  MRN:  [id number]  Referring Physician: ZHANG Newberry    Medical Diagnosis: Strain of right rotator cuff capsule, initial encounter [S46.011A]      Visit Information:  Insurance: Payor: Antoine Brown / Plan: [address] [place] [address] / Product Type: *No Product type* /   Visits to Date: 4   No Show/Cancelled Appts: 0 /       For today's appointment patient:  [x]  Cancelled  []  Rescheduled appointment  []  No-show   []  Called pt to remind of next appointment     Reason given by patient:  [x]  Patient ill  []  Conflicting appointment  []  No transportation    []  Conflict with work  []  No reason given  []  Other:      [x] Pt has future appointments scheduled, no follow up needed  [] Pt requests to be on hold.     Reason:   If > 2 weeks please discuss with therapist.  [] Therapist to call pt for follow up     Comments:       Signature: Electronically signed by Severiano Adams PT on 22 at 2:48 PM EST

## 2022-12-19 ENCOUNTER — APPOINTMENT (OUTPATIENT)
Dept: PHYSICAL THERAPY | Age: 52
End: 2022-12-19
Payer: COMMERCIAL

## 2022-12-19 DIAGNOSIS — F41.9 ANXIETY: ICD-10-CM

## 2022-12-19 PROCEDURE — 97110 THERAPEUTIC EXERCISES: CPT

## 2022-12-19 PROCEDURE — 97140 MANUAL THERAPY 1/> REGIONS: CPT

## 2022-12-19 RX ORDER — ALPRAZOLAM 0.5 MG/1
0.5 TABLET ORAL 3 TIMES DAILY PRN
Qty: 21 TABLET | Refills: 0 | Status: SHIPPED | OUTPATIENT
Start: 2022-12-19 | End: 2022-12-26

## 2022-12-19 NOTE — PROGRESS NOTES
[facility]  Outpatient Physical Therapy    Treatment Note        Date: 2022  Patient: Monica Bullard Angle  : 1970   Confirmed: Yes  MRN: [id number]  Referring Provider: ZHANG Johnson    Medical Diagnosis: Strain of right rotator cuff capsule, initial encounter [S46.011A]       Treatment Diagnosis: R shoulder pain    Visit Information:  Insurance: Payor: René ELLERtheresamelody 150 / Plan: [address] [place] [address] / Product Type: *No Product type* /   PT Visit Information  Onset Date: 10/28/22  PT Insurance Information: BCBS  Total # of Visits Approved: 60  Total # of Visits to Date: 5  No Show: 0  Canceled Appointment: 1  Progress Note Counter: 5/10    Subjective Information:  Subjective: Pt reports no pain at restbut reaching uo catches and 10/10 sharp. States Pian area shrinking. moving better. HEP Compliance:  [x] Good [] Fair [] Poor [] Reports not doing due to:    Pain Screening  Patient Currently in Pain: Denies    Treatment:  Exercises:  Exercises  Exercise 1: pulleysx 4 minutes  Exercise 2: R shoulder PROM w/ light GHJ distraction x 15 min (flex, ABD, ER)  Exercise 3: Restart US NV  Exercise 4: Right shoulder ext and abd YTB 2 x 10  Exercise 5: cane shoulder flex, supine, underhand  3 sec x 15  Exercise 6: ER & IR shoulder YTB 2 x 15  Exercise 7: Rythmic stab. 30 sec x 3  Exercise 8: Plank position stading at table. wt shift and reach away x 20  Exercise 9: Ball stabs on table x 20 4 ways. Exercise 10: Scap retract 5 sec x 15  Exercise 20: HEP:Continue current + scap retract, Ext & Abd       Manual:   Manual Therapy  Joint Mobilization: GHJ grd 1-2  Soft Tissue Mobilizaton: R shoulder musculature anterior/posterior total manual 16 minutes  Other: PROM flex, abd ER with distraction. KT Y strip and I strip retraction.         *Indicates exercise, modality, or manual techniques to be initiated when appropriate    Objective Measures:        Strength: [x] NT  [] MMT completed:     ROM: [] NT  [x] ROM measurements:         AROM RUE (degrees)  R Shoulder Flexion (0-180): 148* with pain at 70 deg;  R Shoulder ABduction (0-180): 142* Crepitus noted           Assessment: Body Structures, Functions, Activity Limitations Requiring Skilled Therapeutic Intervention: Increased pain, Decreased ROM, Decreased strength, Decreased ADL status, Decreased functional mobility , Decreased high-level IADLs, Decreased tolerance to work activity  Assessment: Pt reports Pain area smaller. Continue to progress exercises with focus on stabilizing Right GHJ. AROM improved today. KT Tape release signed and applied Yand I strip to right shoulder. States less popping and no pain at 70 degrees flexion. US not done due to clinic error. Treatment Diagnosis: R shoulder pain  Therapy Prognosis: Good          Post-Pain Assessment:       Pain Rating (0-10 pain scale):   0/10   Location and pain description same as pre-treatment unless indicated. Action: [] NA   [] Perform HEP  [] Meds as prescribed  [] Modalities as prescribed   [] Call Physician     GOALS   Patient Goal(s): Patient Goals : \"fix my shoulder\"    Long Term Goals Completed by 4-6 weeks Goal Status   LTG 1 Full R shoulder AROM w/o pain to perform all reaching tasks In progress   LTG 2 R shoulder strength 5/5 w/o pain to resistance for improved lift, push, and pull activity tolerance In progress   LTG 3 Decreased R shoulder pain to < 2/10 with all ADLS, work, and recreational activity. In progress   LTG 4 UEFI score > 75/80 In progress   LTG 5 [place] with HEP In progress          Plan:  Frequency/Duration:  Plan  Plan Frequency: 1-2  Plan weeks: 4-6  Current Treatment Recommendations: Modalities, Dry needling, Home exercise program, Patient/Caregiver education & training, ROM, Strengthening, Neuromuscular re-education, Manual, Pain management  Pt to continue current HEP.   See objective section for any therapeutic exercise changes, additions or modifications this date.    Therapy Time:      PT Individual Minutes  Time In: 1500  Time Out: [facility]  Minutes: 44  Timed Code Treatment Minutes: 44 Minutes  Procedure Minutes:0    Timed Activity Minutes Units   Ther Ex 30 2   Manual  14 1     Electronically signed by Mick Barriga PTA on 12/19/22 at 3:54 PM EST

## 2022-12-26 DIAGNOSIS — F41.9 ANXIETY: ICD-10-CM

## 2022-12-27 RX ORDER — ALPRAZOLAM 0.5 MG/1
0.5 TABLET ORAL 3 TIMES DAILY PRN
Qty: 21 TABLET | Refills: 0 | Status: SHIPPED | OUTPATIENT
Start: 2022-12-27 | End: 2023-01-03

## 2022-12-27 NOTE — TELEPHONE ENCOUNTER
Future Appointments    Encounter Information    Provider Department Appt Notes   1/3/2023 Ramonia Gilford, [address] transition to [place] with chris after 12/20/22   [employer]   1-2x/wk, 40 min ortho   3-4 weeks   Chris or ortho PTA   1/6/2023 ZHANG Orta [facility] [address] [facility] right shoulder sprain F/U   1/6/2023 Yolanda Saucedo, PTA [address] transition to [place] with chris after 12/20/22   [employer]   1-2x/wk, 40 min ortho   3-4 weeks   Janece Dusky or ortho PTA     Past Visits    Date Provider Specialty Visit Type Primary Dx   12/15/2022 Heather Mendoza MD Gastroenterology Office Visit Gastroesophageal reflux disease, unspecified whether esophagitis present   12/06/2022 Aide Dupree MD Family Medicine Office Visit Anxiety   12/02/2022 Suzie Hernandez, [place] [facility] Office Visit Strain of right rotator cuff capsule, subsequent encounter

## 2023-01-03 ENCOUNTER — HOSPITAL ENCOUNTER (OUTPATIENT)
Dept: PHYSICAL THERAPY | Age: 53
Setting detail: THERAPIES SERIES
Discharge: HOME | End: 2023-01-03
Payer: COMMERCIAL

## 2023-01-03 PROCEDURE — 97035 APP MDLTY 1+ULTRASOUND EA 15: CPT

## 2023-01-03 PROCEDURE — 97110 THERAPEUTIC EXERCISES: CPT

## 2023-01-03 NOTE — PROGRESS NOTES
[facility]  Outpatient Physical Therapy   Treatment Note        Date: 1/3/2023  Patient: Raysa Dejesus  : 1970   Confirmed: Yes  MRN: [id number]  Referring Provider: ZHANG Baeza      Medical Diagnosis: Strain of right rotator cuff capsule, initial encounter [S46.011A]      Treatment Diagnosis: R shoulder pain    Visit Information:  Insurance: Payor: René Simpson 150 / Plan: [address] [place] [address] / Product Type: *No Product type* /   PT Visit Information  Onset Date: 10/28/22  PT Insurance Information: BCBS  Total # of Visits Approved: 60  Total # of Visits to Date: 6  No Show: 0  Canceled Appointment: 1  Progress Note Counter: 6/10    Subjective Information:  Subjective: Patient reports pain is getting better. HEP Compliance:  [x] Good [] Fair [] Poor [] Reports not doing due to:    Pain Screening  Patient Currently in Pain: Denies    Treatment:  Exercises:  Exercises  Exercise 1: pulleys x 4 minutes  Exercise 3: US this visit; see modalities  Exercise 5: reaching with 2lb weighted ball to numbers on the wall x 4 (pain flexion over 90 deg)  Exercise 6: ER & IR shoulder RTB 2 x 15  Exercise 8: Plank position stading at table.  wt shift and reach away x 20  Exercise 9: Ball stabs with 2lb weight at wall with elbow at 90 degrees of flexion x 10 (4 way)  Exercise 10: GTB rows/lats x 15  Exercise 11: UBE L1 x 4 minutes (2 minutes forward, 2 minutes retro)  Exercise 12: attempted wall push ups but with increased pain (held)  Exercise 13: shoulder flexion to 90 deg with 3# weight x 10, abduction to 90 deg with 3# weight x 10  Exercise 20: HEP: continue with current       Modalities:  Ultrasound (CPT 24600)  Patient Position: Seated  Ultrasound location: Right, Shoulder  Ultrasound specified location: anterior and posterior shoulder capsule  Ultrasound frequency: 3 MHz  Ultrasound intensity (W/cm2): 1  Ultrasound mode: Continuous  Ultrasound Parameters: 8 minutes  Post treatment skin assessment: Intact *Indicates exercise, modality, or manual techniques to be initiated when appropriate    Objective Measures:         LTG 1 Current Status[de-identified] flexion 158 deg, abduction 136 deg  LTG 2 Current Status[de-identified] strength ranges from 4- to 5/5 in right shoulder (taken 1/3/23)  LTG 3 Current Status[de-identified] Patient reports pain with certain movements  LTG 4 Current Status[de-identified] 72/80 (1/3/23)  LTG 5 Current Status[de-identified] independent wtih HEP (limited with progressions due to pain)      Assessment: Body Structures, Functions, Activity Limitations Requiring Skilled Therapeutic Intervention: Increased pain, Decreased ROM, Decreased strength, Decreased ADL status, Decreased functional mobility , Decreased high-level IADLs, Decreased tolerance to work activity  Assessment: Patient demonstrates improvements in right shoulder ROM and strength since coming to PT. But patient continues to report pain with certain movements and has difficulty completing wall push ups. Patient would benefit from further testing due to continued symptoms. Continue with physcial therapy as tolerates. Treatment Diagnosis: R shoulder pain             Post-Pain Assessment:       Pain Rating (0-10 pain scale):   0/10   Location and pain description same as pre-treatment unless indicated. Action: [] NA   [] Perform HEP  [] Meds as prescribed  [] Modalities as prescribed   [] Call Physician     GOALS   Patient Goal(s): Patient Goals : \"fix my shoulder\"    Long Term Goals Completed by 4-6 weeks Goal Status   LTG 1 Full R shoulder AROM w/o pain to perform all reaching tasks In progress   LTG 2 R shoulder strength 5/5 w/o pain to resistance for improved lift, push, and pull activity tolerance In progress   LTG 3 Decreased R shoulder pain to < 2/10 with all ADLS, work, and recreational activity.  In progress   LTG 4 UEFI score > 75/80 In progress   LTG 5 [place] with HEP In progress     Plan:  Frequency/Duration:  Plan  Plan Frequency: 1-2  Plan weeks: 4-6  Current Treatment Recommendations: Modalities, Dry needling, Home exercise program, Patient/Caregiver education & training, ROM, Strengthening, Neuromuscular re-education, Manual, Pain management  Pt to continue current HEP. See objective section for any therapeutic exercise changes, additions or modifications this date.     Therapy Time:      PT Individual Minutes  Time In: 1501  Time Out: 2594  Minutes: 39  Timed Code Treatment Minutes: 39 Minutes  Procedure Minutes:0 minutes  Timed Activity Minutes Units   Ther Ex 31 2   Ultrasound 8 1     Electronically signed by Julienne Watson, PT on 1/3/23 at 4:12 PM EST

## 2023-01-03 NOTE — PROGRESS NOTES
[address]     Ph: [phone]  Fax: [phone]    [] Certification  [] Recertification []  Plan of Care  [x] Progress Note [] Discharge      Referring Provider: ZHANG Baeza    From:  Vanessa Werner, PT     Patient: Lyndon Meraz (46 y.o. male) : 1970 Date: 2023   Medical Diagnosis: Strain of right rotator cuff capsule, initial encounter [S46.011A]    Treatment Diagnosis: R shoulder pain      Progress Report Period from: 2023  to 1/3/2023    Visits to Date: 6 No Show: 0 Cancelled Appts: 1    OBJECTIVE:   Short Term Goals =Long term goals    Long Term Goals - Time Frame for Long Term Goals : 4-6 weeks  Goals Current/ Discharge status Status   Long Term Goal 1: Full R shoulder AROM w/o pain to perform all reaching tasks LTG 1 Current Status[de-identified] flexion 158 deg, abduction 136 deg   In progress   Long Term Goal 2: R shoulder strength 5/5 w/o pain to resistance for improved lift, push, and pull activity tolerance LTG 2 Current Status[de-identified] strength ranges from 4- to 5/5 in right shoulder (taken 1/3/23)        Strength RUE  R Shoulder Flexion: 4/5  R Shoulder Extension: 5/5  R Shoulder ABduction: 4-/5  R Shoulder Internal Rotation: 5/5  R Shoulder External Rotation: 5/5           In progress   Long Term Goal 3: Decreased R shoulder pain to < 2/10 with all ADLS, work, and recreational activity. LTG 3 Current Status[de-identified] Patient reports pain with certain movements   In progress   Long Term Goal 4: UEFI score > 75/80 LTG 4 Current Status[de-identified] 72/80 (1/3/23)   In progress   Long Term Goal 5:  [place] with HEP LTG 5 Current Status[de-identified] independent wtih HEP (limited with progressions due to pain)   In progress     Body Structures, Functions, Activity Limitations Requiring Skilled Therapeutic Intervention: Increased pain, Decreased ROM, Decreased strength, Decreased ADL status, Decreased functional mobility , Decreased high-level IADLs, Decreased tolerance to work activity  Assessment: Patient demonstrates improvements in right shoulder ROM and strength since coming to PT. But patient continues to report pain with certain movements and has difficulty completing wall push ups. Patient would benefit from further testing due to continued symptoms. Continue with physcial therapy as tolerates. PLAN: [x] Evaluate and Treat  Frequency/Duration:  Plan Frequency: 1-2  Plan weeks: 4-6  Current Treatment Recommendations: Modalities, Dry needling, Home exercise program, Patient/Caregiver education & training, ROM, Strengthening, Neuromuscular re-education, Manual, Pain management     Precautions:                            Patient Status:[x] Continue/ Initiate plan of Care     [] Discharge PT. Recommend pt continue with HEP. [] Additional visits requested, Please re-certify for additional visits:     [] Hold     Signature:  Electronically signed by Stanley Cardenas PT on 1/3/23 at 4:14 PM EST          If you have any questions or concerns, please don't hesitate to call. Thank you for your referral.    I have reviewed this plan of care and certify a need for medically necessary rehabilitation services.     Physician Signature:__________________________________________________________  Date:  Please sign and return

## 2023-01-05 ENCOUNTER — PATIENT MESSAGE (OUTPATIENT)
Dept: FAMILY MEDICINE CLINIC | Age: 53
End: 2023-01-05

## 2023-01-05 DIAGNOSIS — F41.9 ANXIETY: ICD-10-CM

## 2023-01-05 RX ORDER — ALPRAZOLAM 0.5 MG/1
0.5 TABLET ORAL 3 TIMES DAILY PRN
Qty: 21 TABLET | Refills: 0 | Status: SHIPPED | OUTPATIENT
Start: 2023-01-05 | End: 2023-01-12

## 2023-01-05 NOTE — TELEPHONE ENCOUNTER
From: Feliberto Dejesus  To: Dr. Keenan Sa: 1/5/2023 8:14 AM EST  Subject: Xanax refill     Please order a refill of Xanax for  at [facility]

## 2023-01-06 ENCOUNTER — ANESTHESIA (OUTPATIENT)
Dept: ENDOSCOPY | Age: 53
End: 2023-01-06
Payer: COMMERCIAL

## 2023-01-06 ENCOUNTER — HOSPITAL ENCOUNTER (OUTPATIENT)
Dept: PHYSICAL THERAPY | Age: 53
Setting detail: THERAPIES SERIES
End: 2023-01-06
Payer: COMMERCIAL

## 2023-01-06 ENCOUNTER — ANESTHESIA EVENT (OUTPATIENT)
Dept: ENDOSCOPY | Age: 53
End: 2023-01-06
Payer: COMMERCIAL

## 2023-01-06 ENCOUNTER — OFFICE VISIT (OUTPATIENT)
Dept: ORTHOPEDIC SURGERY | Age: 53
End: 2023-01-06
Payer: COMMERCIAL

## 2023-01-06 ENCOUNTER — HOSPITAL ENCOUNTER (OUTPATIENT)
Age: 53
Setting detail: OUTPATIENT SURGERY
Discharge: HOME | End: 2023-01-06
Attending: INTERNAL MEDICINE | Admitting: INTERNAL MEDICINE
Payer: COMMERCIAL

## 2023-01-06 VITALS
HEIGHT: 74 IN | WEIGHT: 215 LBS | RESPIRATION RATE: 18 BRPM | SYSTOLIC BLOOD PRESSURE: 119 MMHG | TEMPERATURE: 97.9 F | HEART RATE: 65 BPM | OXYGEN SATURATION: 95 % | BODY MASS INDEX: 27.59 KG/M2 | DIASTOLIC BLOOD PRESSURE: 77 MMHG

## 2023-01-06 VITALS
WEIGHT: 210 LBS | HEIGHT: 74 IN | BODY MASS INDEX: 26.95 KG/M2 | HEART RATE: 61 BPM | OXYGEN SATURATION: 99 % | TEMPERATURE: 96.8 F

## 2023-01-06 DIAGNOSIS — S46.011D STRAIN OF RIGHT ROTATOR CUFF CAPSULE, SUBSEQUENT ENCOUNTER: Primary | ICD-10-CM

## 2023-01-06 DIAGNOSIS — S43.431D LABRAL TEAR OF SHOULDER, RIGHT, SUBSEQUENT ENCOUNTER: ICD-10-CM

## 2023-01-06 PROCEDURE — 2580000003 HC RX 258: Performed by: INTERNAL MEDICINE

## 2023-01-06 PROCEDURE — 7100000010 HC PHASE II RECOVERY - FIRST 15 MIN: Performed by: INTERNAL MEDICINE

## 2023-01-06 PROCEDURE — 99214 OFFICE O/P EST MOD 30 MIN: CPT | Performed by: PHYSICIAN ASSISTANT

## 2023-01-06 PROCEDURE — 2500000003 HC RX 250 WO HCPCS: Performed by: NURSE ANESTHETIST, CERTIFIED REGISTERED

## 2023-01-06 PROCEDURE — 6370000000 HC RX 637 (ALT 250 FOR IP): Performed by: INTERNAL MEDICINE

## 2023-01-06 PROCEDURE — 2709999900 HC NON-CHARGEABLE SUPPLY: Performed by: INTERNAL MEDICINE

## 2023-01-06 PROCEDURE — 3700000001 HC ADD 15 MINUTES (ANESTHESIA): Performed by: INTERNAL MEDICINE

## 2023-01-06 PROCEDURE — 6360000002 HC RX W HCPCS: Performed by: NURSE ANESTHETIST, CERTIFIED REGISTERED

## 2023-01-06 PROCEDURE — 7100000011 HC PHASE II RECOVERY - ADDTL 15 MIN: Performed by: INTERNAL MEDICINE

## 2023-01-06 PROCEDURE — 3609017100 HC EGD: Performed by: INTERNAL MEDICINE

## 2023-01-06 PROCEDURE — 2580000003 HC RX 258

## 2023-01-06 PROCEDURE — 3700000000 HC ANESTHESIA ATTENDED CARE: Performed by: INTERNAL MEDICINE

## 2023-01-06 PROCEDURE — 43235 EGD DIAGNOSTIC BRUSH WASH: CPT | Performed by: INTERNAL MEDICINE

## 2023-01-06 RX ORDER — MIDAZOLAM HYDROCHLORIDE 1 MG/ML
INJECTION INTRAMUSCULAR; INTRAVENOUS PRN
Status: DISCONTINUED | OUTPATIENT
Start: 2023-01-06 | End: 2023-01-06 | Stop reason: SDUPTHER

## 2023-01-06 RX ORDER — MIDAZOLAM HYDROCHLORIDE 1 MG/ML
INJECTION INTRAMUSCULAR; INTRAVENOUS
Status: COMPLETED
Start: 2023-01-06 | End: 2023-01-06

## 2023-01-06 RX ORDER — SODIUM CHLORIDE 0.9 % (FLUSH) 0.9 %
5-40 SYRINGE (ML) INJECTION PRN
Status: CANCELLED | OUTPATIENT
Start: 2023-01-06

## 2023-01-06 RX ORDER — SODIUM CHLORIDE 9 MG/ML
INJECTION, SOLUTION INTRAVENOUS PRN
Status: CANCELLED | OUTPATIENT
Start: 2023-01-06

## 2023-01-06 RX ORDER — MAGNESIUM HYDROXIDE 1200 MG/15ML
LIQUID ORAL PRN
Status: DISCONTINUED | OUTPATIENT
Start: 2023-01-06 | End: 2023-01-06 | Stop reason: ALTCHOICE

## 2023-01-06 RX ORDER — SIMETHICONE 20 MG/.3ML
EMULSION ORAL PRN
Status: DISCONTINUED | OUTPATIENT
Start: 2023-01-06 | End: 2023-01-06 | Stop reason: ALTCHOICE

## 2023-01-06 RX ORDER — SODIUM CHLORIDE 9 MG/ML
INJECTION, SOLUTION INTRAVENOUS CONTINUOUS
Status: DISCONTINUED | OUTPATIENT
Start: 2023-01-06 | End: 2023-01-06 | Stop reason: HOSPADM

## 2023-01-06 RX ORDER — PROPOFOL 10 MG/ML
INJECTION, EMULSION INTRAVENOUS PRN
Status: DISCONTINUED | OUTPATIENT
Start: 2023-01-06 | End: 2023-01-06 | Stop reason: SDUPTHER

## 2023-01-06 RX ORDER — SIMETHICONE 20 MG/.3ML
EMULSION ORAL
Status: DISCONTINUED
Start: 2023-01-06 | End: 2023-01-06 | Stop reason: HOSPADM

## 2023-01-06 RX ORDER — LIDOCAINE HYDROCHLORIDE 20 MG/ML
INJECTION, SOLUTION INFILTRATION; PERINEURAL PRN
Status: DISCONTINUED | OUTPATIENT
Start: 2023-01-06 | End: 2023-01-06 | Stop reason: SDUPTHER

## 2023-01-06 RX ORDER — SODIUM CHLORIDE 9 MG/ML
INJECTION, SOLUTION INTRAVENOUS
Status: COMPLETED
Start: 2023-01-06 | End: 2023-01-06

## 2023-01-06 RX ORDER — FENTANYL CITRATE 50 UG/ML
INJECTION, SOLUTION INTRAMUSCULAR; INTRAVENOUS PRN
Status: DISCONTINUED | OUTPATIENT
Start: 2023-01-06 | End: 2023-01-06 | Stop reason: SDUPTHER

## 2023-01-06 RX ORDER — FENTANYL CITRATE 50 UG/ML
INJECTION, SOLUTION INTRAMUSCULAR; INTRAVENOUS
Status: COMPLETED
Start: 2023-01-06 | End: 2023-01-06

## 2023-01-06 RX ORDER — SODIUM CHLORIDE 0.9 % (FLUSH) 0.9 %
5-40 SYRINGE (ML) INJECTION EVERY 12 HOURS SCHEDULED
Status: CANCELLED | OUTPATIENT
Start: 2023-01-06

## 2023-01-06 RX ADMIN — SODIUM CHLORIDE: 9 INJECTION, SOLUTION INTRAVENOUS at 11:36

## 2023-01-06 RX ADMIN — FENTANYL CITRATE 50 MCG: 50 INJECTION, SOLUTION INTRAMUSCULAR; INTRAVENOUS at 12:21

## 2023-01-06 RX ADMIN — FENTANYL CITRATE 50 MCG: 50 INJECTION, SOLUTION INTRAMUSCULAR; INTRAVENOUS at 12:10

## 2023-01-06 RX ADMIN — PROPOFOL 100 MG: 10 INJECTION, EMULSION INTRAVENOUS at 12:13

## 2023-01-06 RX ADMIN — MIDAZOLAM HYDROCHLORIDE 2 MG: 1 INJECTION, SOLUTION INTRAMUSCULAR; INTRAVENOUS at 12:10

## 2023-01-06 RX ADMIN — LIDOCAINE HYDROCHLORIDE 50 MG: 20 INJECTION, SOLUTION INFILTRATION; PERINEURAL at 12:13

## 2023-01-06 ASSESSMENT — LIFESTYLE VARIABLES: SMOKING_STATUS: 0

## 2023-01-06 ASSESSMENT — PAIN - FUNCTIONAL ASSESSMENT: PAIN_FUNCTIONAL_ASSESSMENT: 0-10

## 2023-01-06 ASSESSMENT — ENCOUNTER SYMPTOMS: RESPIRATORY NEGATIVE: 1

## 2023-01-06 NOTE — PROGRESS NOTES
Feliberto Dejesus (:  1970) is a 46 y.o. male,Established patient, here for evaluation of the following chief complaint(s):  Follow-up (Right shoulder sprain f/u Patient states his shoulder has improved,  Patient says he still feels pain.)         ASSESSMENT/PLAN:  1. Strain of right rotator cuff capsule, subsequent encounter      No follow-ups on file. Subjective   SUBJECTIVE/OBJECTIVE:  This a 58-year-old right-hand-dominant male following up for complaints of right shoulder pain. He states his shoulder pain has improved considerably however is not entirely resolved. States he has difficulty with certain movements of the arm due to shoulder pain. States his strength is good but he is having difficulty sleeping on his right side due to shoulder pain. Denies change in sensation. Denies any new neck pain. Review of Systems   Constitutional: Negative. HENT: Negative. Respiratory: Negative. Skin: Negative. Neurological: Negative. Objective   Physical Exam  Musculoskeletal:      Comments: Right shoulder-no acromioclavicular, clavicle, SC joint tenderness with palpation. Abduction and abduction strength is strong and symmetrical.  Internal rotation is 0 degrees, external rotation is 110 degrees about 10 degrees less than the left. Apprehension sign is negative. Supraspinatus test elicits mild pain but no specific weakness. Vienna's test is positive. Liftoff is negative. Biceps contour is normal.  Sensation is intact distally to light touch. Plain to the patient that his while his pain and strength are both improved I am concerned about injury to his labrum.   We will proceed with an MRI arthrogram.  He will continue in his home exercise program.  Like to see him back after his MRI arthrogram.       On this date 2023 I have spent 35 minutes reviewing previous notes, test results and face to face with the patient discussing the diagnosis and importance of compliance with the treatment plan as well as documenting on the day of the visit. An electronic signature was used to authenticate this note.     --ZHANG Blue

## 2023-01-06 NOTE — H&P
Patient Name: Jorge Costa  : 1970  MRN: [id number]  DATE: 23      ENDOSCOPY  History and Physical    Procedure:    [] Diagnostic Colonoscopy       [] Screening Colonoscopy  [x] EGD      [] ERCP      [] EUS       [] Other    [x] Previous office notes/History and Physical reviewed from the patients chart. Please see EMR for further details of HPI. I have examined the patient's status immediately prior to the procedure and:      Indications/HPI:    []Abdominal Pain   []Cancer- GI/Lung  []Fhx of colon CA  []History of Polyps   []Rodrigezs   []Melena  []Abnormal Imaging   []Dysphagia    []Persistent Pneumonia  []Anemia   []Food Impaction  []History of Polyps  []GI Bleed   []Pulmonary nodule/Mass  []Change in bowel habits  [x]Heartburn/Reflux  []Rectal Bleed (BRBPR)  []Chest Pain - Non Cardiac  []Heme (+) Stool  []Ulcers  []Constipation   []Hemoptysis   []Varices  []Diarrhea   []Hypoxemia  []Nausea/Vomiting   []Screening   []Crohns/Colitis  []Other:    Anesthesia:   [x] MAC [] Moderate Sedation   [] General   [] None     ROS: 12 pt Review of Symptoms was negative unless mentioned above    Medications:   Prior to Admission medications    Medication Sig Start Date End Date Taking? Authorizing Provider   ALPRAZolam Kieran Juares) 0.5 MG tablet Take 1 tablet by mouth 3 times daily as needed for Sleep or Anxiety for up to 7 days.  23  Eliana Guzman MD   escitalopram (LEXAPRO) 20 MG tablet Take 1 tablet by mouth daily 22   Eliana Guzman MD   amLODIPine [facility]) 5 MG tablet Take 1 tab by mouth daily 22   Eliana Guzman MD   omeprazole (Jennifer Jennifer) 20 MG delayed release capsule Take one cap by mouth daily 22   Eliana Guzman MD     Allergies: No Known Allergies   History of allergic reaction to anesthesia:  No  Past Medical History:  Past Medical History:   Diagnosis Date    Chronic back pain     GERD (gastroesophageal reflux disease)     Heartburn     HTN (hypertension)      Past Surgical History:  Past Surgical History:   Procedure Laterality Date    APPENDECTOMY      BACK SURGERY      COLONOSCOPY N/A 2021    COLORECTAL CANCER SCREENING, NOT HIGH RISK performed by Maria De Jesus Mcintyre MD at [facility] 23 ARTHROSCOPY Left 2020    ARTHROSCOPY LEFT KNEE, PARTIAL MEDIAL MENISECTOMY performed by Brooklyn Mcintyre MD at [place] [address] CYST EXCISION  2005    TONSILLECTOMY      UPPER GASTROINTESTINAL ENDOSCOPY  2008    Hiatal hernia, normal stomach, normal duodenum     Social History:  Social History     Tobacco Use    Smoking status: Former     Packs/day: 0.00     Years: 5.00     Pack years: 0.00     Types: Cigarettes     Quit date: 2007     Years since quittin.0    Smokeless tobacco: Never    Tobacco comments:     Vapor   Vaping Use    Vaping Use: Former    Substances: Nicotine, Flavoring   Substance Use Topics    Alcohol use: Not Currently     Comment: occasionally    Drug use: No     Vital Signs:   Vitals:    23 1133   BP: (!) 144/81   Pulse: 55   Resp: 18   Temp: 97.9 °F (36.6 °C)   SpO2: 99%       Physical Exam:  Cardiac:  [x]WNL []Comments:  Pulmonary:  [x]WNL []Comments:   Neuro/Mental Status:  [x]WNL []Comments:  Abdominal:  [x]WNL []Comments:  Other:   []WNL []Comments:    Informed Consent:  The risks and benefits of the procedure have been discussed with either the patient or if they cannot consent, their representative. Assessment:  Patient examined and appropriate for planned sedation and procedure. Plan:  Proceed with planned sedation and procedure as above. The patient was counseled at length about risks of alvin COVID-19 in the perioperative and any recovery window from the procedure. The patient was made aware that alvin COVID-19 may worsen their prognosis for recovery from their procedure and lend to a higher morbidity and-all mortality risk.   The patient was given the option of postponing the procedure all material risks, benefits, and alternatives were discussed. The patient does wish to proceed with the procedure at this time.     Brenton Rojo MD  12:09 PM

## 2023-01-06 NOTE — PROGRESS NOTES
Therapy                            Cancellation/No-show Note    Date: 2023  Patient: Jerry Amado (46 y.o. male)  : 1970  MRN:  [id number]  Referring Physician: ZHANG Perez    Medical Diagnosis: Strain of right rotator cuff capsule, initial encounter [S46.011A]      Visit Information:  Insurance: Payor: Marguerite Kay / Plan: [address] [place] [address] / Product Type: *No Product type* /   Visits to Date: 6   No Show/Cancelled Appts: 0 /       For today's appointment patient:  [x]  Cancelled  []  Rescheduled appointment  []  No-show   []  Called pt to remind of next appointment     Reason given by patient:  []  Patient ill  []  Conflicting appointment  []  No transportation    []  Conflict with work  []  No reason given  [x]  Other:  patient will follow up with physician about shoulder pain    [] Pt has future appointments scheduled, no follow up needed  [] Pt requests to be on hold.     Reason:   If > 2 weeks please discuss with therapist.  [] Therapist to call pt for follow up     Comments:       Signature: Electronically signed by Amor Canales PT on 23 at 2:31 PM EST

## 2023-01-06 NOTE — ANESTHESIA POSTPROCEDURE EVALUATION
Department of Anesthesiology  Postprocedure Note    Patient: Noni Venegas  MRN: [id number]  YOB: 1970  Date of evaluation: 1/6/2023      Procedure Summary     Date: 01/06/23 Room / Location: [address] [place] [address] Yong Mendieta    Anesthesia Start: 1208 Anesthesia Stop: 1224    Procedure: EGD ESOPHAGOGASTRODUODENOSCOPY Diagnosis:       Gastroesophageal reflux disease, unspecified whether esophagitis present      (Gastroesophageal reflux disease, unspecified whether esophagitis present [K21.9])    Surgeons: Zuleyma Arauz MD Responsible Provider: JB English CRNA    Anesthesia Type: MAC ASA Status: 2          Anesthesia Type: No value filed.     Seymour Phase I: Seymour Score: 10    Seymour Phase II:        Anesthesia Post Evaluation    Patient location during evaluation: bedside  Patient participation: complete - patient participated  Level of consciousness: awake and awake and alert  Pain score: 0  Airway patency: patent  Nausea & Vomiting: no nausea and no vomiting  Complications: no  Cardiovascular status: blood pressure returned to baseline and hemodynamically stable  Respiratory status: acceptable  Hydration status: euvolemic

## 2023-01-06 NOTE — ANESTHESIA PRE PROCEDURE
Department of Anesthesiology  Preprocedure Note       Name:  Oracio Patino   Age:  46 y.o.  :  1970                                          MRN:  [id number]         Date:  2023      Surgeon: Alfredo George):  Julio Urbina MD    Procedure: Procedure(s):  EGD ESOPHAGOGASTRODUODENOSCOPY    Medications prior to admission:   Prior to Admission medications    Medication Sig Start Date End Date Taking? Authorizing Provider   ALPRAZolam Emre Distance) 0.5 MG tablet Take 1 tablet by mouth 3 times daily as needed for Sleep or Anxiety for up to 7 days.  23  Isauro Saravia MD   escitalopram (LEXAPRO) 20 MG tablet Take 1 tablet by mouth daily 22   Isauro Saravia MD   amLODIPine [facility]) 5 MG tablet Take 1 tab by mouth daily 22   Isauro Saravia MD   omeprazole (PRILOSEC) 20 MG delayed release capsule Take one cap by mouth daily 22   Isauro Saravia MD       Current medications:    Current Facility-Administered Medications   Medication Dose Route Frequency Provider Last Rate Last Admin    0.9 % sodium chloride infusion   IntraVENous Continuous Julio Urbina  mL/hr at 23 1136 New Bag at 23 1136       Allergies:  No Known Allergies    Problem List:    Patient Active Problem List   Diagnosis Code    Chronic back pain M54.9, G89.29    Hypertension I10    GERD (gastroesophageal reflux disease) K21.9    Obesity E66.9    Acute medial meniscus tear, left, initial encounter S83.242A    Arthritis of left knee M17.12       Past Medical History:        Diagnosis Date    Chronic back pain     GERD (gastroesophageal reflux disease)     Heartburn     HTN (hypertension)        Past Surgical History:        Procedure Laterality Date    APPENDECTOMY      BACK SURGERY      COLONOSCOPY N/A 2021    COLORECTAL CANCER SCREENING, NOT HIGH RISK performed by Julio Urbina MD at [address] ARTHROSCOPY Left 2020    ARTHROSCOPY LEFT KNEE, PARTIAL MEDIAL MENISECTOMY performed by Ana Huggins MD at [address] [place] [address] CYST EXCISION  2005    TONSILLECTOMY      UPPER GASTROINTESTINAL ENDOSCOPY  2008    Hiatal hernia, normal stomach, normal duodenum       Social History:    Social History     Tobacco Use    Smoking status: Former     Packs/day: 0.00     Years: 5.00     Pack years: 0.00     Types: Cigarettes     Quit date: 2007     Years since quittin.0    Smokeless tobacco: Never    Tobacco comments:     Vapor   Substance Use Topics    Alcohol use: Not Currently     Comment: occasionally                                Counseling given: Not Answered  Tobacco comments: Vapor      Vital Signs (Current):   Vitals:    23 1133   BP: (!) 144/81   Pulse: 55   Resp: 18   Temp: 36.6 °C (97.9 °F)   TempSrc: Temporal   SpO2: 99%   Weight: 215 lb (97.5 kg)   Height: 6' 2\" (1.88 m)                                              BP Readings from Last 3 Encounters:   23 (!) 144/81   22 124/82   22 128/80       NPO Status: Time of last liquid consumption: 1800                        Time of last solid consumption: 1800                        Date of last liquid consumption: 23                        Date of last solid food consumption: 23    BMI:   Wt Readings from Last 3 Encounters:   23 215 lb (97.5 kg)   23 210 lb (95.3 kg)   12/15/22 222 lb (100.7 kg)     Body mass index is 27.6 kg/m².     CBC:   Lab Results   Component Value Date/Time    WBC 6.9 2022 01:19 PM    RBC 4.73 2022 01:19 PM    HGB 14.6 2022 01:19 PM    HCT 42.9 2022 01:19 PM    MCV 90.6 2022 01:19 PM    RDW 13.3 2022 01:19 PM     2022 01:19 PM       CMP:   Lab Results   Component Value Date/Time     2022 01:19 PM    K 4.7 2022 01:19 PM     2022 01:19 PM    CO2 28 2022 01:19 PM    BUN 15 2022 01:19 PM    CREATININE 0.86 2022 01:19 PM    GFRAA >60.0 2022 01:19 PM    LABGLOM >60.0 09/27/2022 01:19 PM    GLUCOSE 100 09/27/2022 01:19 PM    GLUCOSE 84 04/23/2012 04:23 PM    PROT 8.1 09/27/2022 01:19 PM    CALCIUM 9.9 09/27/2022 01:19 PM    BILITOT 1.6 09/27/2022 01:19 PM    ALKPHOS 61 09/27/2022 01:19 PM    AST 16 09/27/2022 01:19 PM    ALT 15 09/27/2022 01:19 PM       POC Tests: No results for input(s): POCGLU, POCNA, POCK, POCCL, POCBUN, POCHEMO, POCHCT in the last 72 hours. Coags:   Lab Results   Component Value Date/Time    PROTIME 9.4 06/27/2014 11:16 AM    INR 0.9 06/27/2014 11:16 AM    APTT 26.7 06/27/2014 11:16 AM       HCG (If Applicable): No results found for: PREGTESTUR, PREGSERUM, HCG, HCGQUANT     ABGs: No results found for: PHART, PO2ART, AWP4GTX, CWO8BOH, BEART, V0MITITN     Type & Screen (If Applicable):  No results found for: LABABO, LABRH    Drug/Infectious Status (If Applicable):  Lab Results   Component Value Date/Time    HEPCAB NONREACTIVE 09/27/2022 01:19 PM       COVID-19 Screening (If Applicable):   Lab Results   Component Value Date/Time    COVID19 Not Detected 04/09/2021 06:25 PM           Anesthesia Evaluation  Patient summary reviewed and Nursing notes reviewed no history of anesthetic complications:   Airway: Mallampati: II  TM distance: >3 FB   Neck ROM: full  Mouth opening: > = 3 FB   Dental:          Pulmonary:       (-) not a current smoker                           Cardiovascular:  Exercise tolerance: good (>4 METS),   (+) hypertension:,          Beta Blocker:  Not on Beta Blocker         Neuro/Psych:   Negative Neuro/Psych ROS              GI/Hepatic/Renal:   (+) GERD: well controlled,           Endo/Other:              Pt had no PAT visit       Abdominal:             Vascular: negative vascular ROS. Other Findings:           Anesthesia Plan      MAC     ASA 2       Induction: intravenous. Anesthetic plan and risks discussed with patient.                         JB Louis CRNA   1/6/2023 English

## 2023-01-13 ENCOUNTER — PATIENT MESSAGE (OUTPATIENT)
Dept: FAMILY MEDICINE CLINIC | Age: 53
End: 2023-01-13

## 2023-01-13 DIAGNOSIS — F41.9 ANXIETY: ICD-10-CM

## 2023-01-13 RX ORDER — ALPRAZOLAM 0.5 MG/1
0.5 TABLET ORAL 3 TIMES DAILY PRN
Qty: 21 TABLET | Refills: 0 | Status: SHIPPED | OUTPATIENT
Start: 2023-01-13 | End: 2023-01-20

## 2023-01-13 NOTE — TELEPHONE ENCOUNTER
Comments:     Last Office Visit (last PCP visit):   12/6/2022    Next Visit Date:  Future Appointments   Date Time Provider Thelma Sheldon   1/16/2023  9:30 AM [facility] SPECIAL PROCEDURES ROOM 1 [facility] SP PROC [facility] Fac RAD   1/16/2023 10:30 AM [facility] MRI ROOM 1 [facility] MRI [facility] Fac RAD       **If hasn't been seen in over a year OR hasn't followed up according to last diabetes/ADHD visit, make appointment for patient before sending refill to provider.     Rx requested:  Requested Prescriptions      No prescriptions requested or ordered in this encounter

## 2023-01-13 NOTE — TELEPHONE ENCOUNTER
From: Feliberto Dejesus  To: Dr. Samuel Ashby: 1/13/2023 1:41 PM EST  Subject: Xanax refill    Requesting a refill for Xanax please send to [facility]

## 2023-01-16 ENCOUNTER — HOSPITAL ENCOUNTER (OUTPATIENT)
Dept: INTERVENTIONAL RADIOLOGY/VASCULAR | Age: 53
Discharge: HOME | End: 2023-01-18
Payer: COMMERCIAL

## 2023-01-16 ENCOUNTER — TELEPHONE (OUTPATIENT)
Dept: INTERVENTIONAL RADIOLOGY/VASCULAR | Age: 53
End: 2023-01-16

## 2023-01-16 ENCOUNTER — HOSPITAL ENCOUNTER (OUTPATIENT)
Dept: MRI IMAGING | Age: 53
Discharge: HOME | End: 2023-01-18
Payer: COMMERCIAL

## 2023-01-16 ENCOUNTER — APPOINTMENT (OUTPATIENT)
Dept: GENERAL RADIOLOGY | Age: 53
End: 2023-01-16
Payer: COMMERCIAL

## 2023-01-16 VITALS — DIASTOLIC BLOOD PRESSURE: 86 MMHG | SYSTOLIC BLOOD PRESSURE: 133 MMHG

## 2023-01-16 DIAGNOSIS — S46.011D STRAIN OF RIGHT ROTATOR CUFF CAPSULE, SUBSEQUENT ENCOUNTER: ICD-10-CM

## 2023-01-16 DIAGNOSIS — S43.431D LABRAL TEAR OF SHOULDER, RIGHT, SUBSEQUENT ENCOUNTER: ICD-10-CM

## 2023-01-16 PROCEDURE — 73221 MRI JOINT UPR EXTREM W/O DYE: CPT | Performed by: RADIOLOGY

## 2023-01-16 PROCEDURE — A9579 GAD-BASE MR CONTRAST NOS,1ML: HCPCS | Performed by: RADIOLOGY

## 2023-01-16 PROCEDURE — 2580000003 HC RX 258: Performed by: RADIOLOGY

## 2023-01-16 PROCEDURE — 20610 DRAIN/INJ JOINT/BURSA W/O US: CPT | Performed by: RADIOLOGY

## 2023-01-16 PROCEDURE — 77002 NEEDLE LOCALIZATION BY XRAY: CPT

## 2023-01-16 PROCEDURE — 2500000003 HC RX 250 WO HCPCS: Performed by: RADIOLOGY

## 2023-01-16 PROCEDURE — 6360000004 HC RX CONTRAST MEDICATION: Performed by: RADIOLOGY

## 2023-01-16 PROCEDURE — 73222 MRI JOINT UPR EXTREM W/DYE: CPT

## 2023-01-16 PROCEDURE — 20610 DRAIN/INJ JOINT/BURSA W/O US: CPT

## 2023-01-16 PROCEDURE — 2709999900 IR ARTHR/ASP/INJ MAJOR JT/BURSA RIGHT WO US

## 2023-01-16 RX ORDER — SODIUM CHLORIDE 0.9 % (FLUSH) 0.9 %
10 SYRINGE (ML) INJECTION ONCE
Status: DISCONTINUED | OUTPATIENT
Start: 2023-01-16 | End: 2023-01-19 | Stop reason: HOSPADM

## 2023-01-16 RX ORDER — MAGNESIUM HYDROXIDE 1200 MG/15ML
LIQUID ORAL
Status: COMPLETED | OUTPATIENT
Start: 2023-01-16 | End: 2023-01-16

## 2023-01-16 RX ORDER — LIDOCAINE HYDROCHLORIDE 20 MG/ML
13 INJECTION, SOLUTION INFILTRATION; PERINEURAL ONCE
Status: COMPLETED | OUTPATIENT
Start: 2023-01-16 | End: 2023-01-16

## 2023-01-16 RX ADMIN — LIDOCAINE HYDROCHLORIDE 8 ML: 20 INJECTION, SOLUTION INFILTRATION; PERINEURAL at 10:03

## 2023-01-16 RX ADMIN — IOPAMIDOL 7 ML: 408 INJECTION, SOLUTION INTRATHECAL at 10:04

## 2023-01-16 RX ADMIN — GADOTERIDOL 0.1 ML: 279.3 INJECTION, SOLUTION INTRAVENOUS at 10:05

## 2023-01-16 RX ADMIN — WATER 10 ML: 100 IRRIGANT IRRIGATION at 10:05

## 2023-01-16 NOTE — TELEPHONE ENCOUNTER
Patient was given this information via phone conversation - voiced understanding  2. Spoke to [employer] in [address]: 1/16/2023 @ 9:30 am  >  You will need to arrive at 9:00 am from home and check in at the Diagnostic Imaging Check In desk.

## 2023-01-16 NOTE — OR NURSING
Pt brought to specials, procedure explained and consent signed. Assisted pt onto table in supine position. History, allergies and medications verified. Vital signs obtained. 5383 Dr Alyssa Apley here speaking with the patient. Site cleansed, prepped and draped in a sterile fashion. 0959Time out performed. 1003 2% lidocaine used to numb procedure site. 1004Spinal needle inserted and contrast given using fluoro guidance by Dr Alyssa Apley. 1005 Medication instilled through spinal needle under fluoro guidance. Pt tolerating procedure and verbal support given throughout. Needle removed, band aid applied, no bleeding noted. Pt encouraged to take it easy today with movement, due to medication given. Pt verbalized understanding. Pt assisted off table onto cart and taken to MRI for scan.

## 2023-01-24 ENCOUNTER — OFFICE VISIT (OUTPATIENT)
Dept: FAMILY MEDICINE CLINIC | Age: 53
End: 2023-01-24
Payer: COMMERCIAL

## 2023-01-24 VITALS
HEIGHT: 74 IN | TEMPERATURE: 98 F | WEIGHT: 237.6 LBS | DIASTOLIC BLOOD PRESSURE: 84 MMHG | BODY MASS INDEX: 30.49 KG/M2 | OXYGEN SATURATION: 100 % | HEART RATE: 77 BPM | SYSTOLIC BLOOD PRESSURE: 126 MMHG

## 2023-01-24 DIAGNOSIS — S43.401A SPRAIN OF RIGHT SHOULDER, UNSPECIFIED SHOULDER SPRAIN TYPE, INITIAL ENCOUNTER: ICD-10-CM

## 2023-01-24 DIAGNOSIS — F41.9 ANXIETY: Primary | ICD-10-CM

## 2023-01-24 DIAGNOSIS — L98.9 SKIN LESION: ICD-10-CM

## 2023-01-24 PROCEDURE — 3079F DIAST BP 80-89 MM HG: CPT | Performed by: FAMILY MEDICINE

## 2023-01-24 PROCEDURE — 3074F SYST BP LT 130 MM HG: CPT | Performed by: FAMILY MEDICINE

## 2023-01-24 PROCEDURE — 99213 OFFICE O/P EST LOW 20 MIN: CPT | Performed by: FAMILY MEDICINE

## 2023-01-24 RX ORDER — ESCITALOPRAM OXALATE 20 MG/1
20 TABLET ORAL DAILY
Qty: 30 TABLET | Refills: 5 | Status: SHIPPED | OUTPATIENT
Start: 2023-01-24

## 2023-01-24 ASSESSMENT — PATIENT HEALTH QUESTIONNAIRE - PHQ9
1. LITTLE INTEREST OR PLEASURE IN DOING THINGS: 3
SUM OF ALL RESPONSES TO PHQ QUESTIONS 1-9: 10
SUM OF ALL RESPONSES TO PHQ QUESTIONS 1-9: 10
10. IF YOU CHECKED OFF ANY PROBLEMS, HOW DIFFICULT HAVE THESE PROBLEMS MADE IT FOR YOU TO DO YOUR WORK, TAKE CARE OF THINGS AT HOME, OR GET ALONG WITH OTHER PEOPLE: 3
5. POOR APPETITE OR OVEREATING: 0
8. MOVING OR SPEAKING SO SLOWLY THAT OTHER PEOPLE COULD HAVE NOTICED. OR THE OPPOSITE, BEING SO FIGETY OR RESTLESS THAT YOU HAVE BEEN MOVING AROUND A LOT MORE THAN USUAL: 0
3. TROUBLE FALLING OR STAYING ASLEEP: 1
6. FEELING BAD ABOUT YOURSELF - OR THAT YOU ARE A FAILURE OR HAVE LET YOURSELF OR YOUR FAMILY DOWN: 0
9. THOUGHTS THAT YOU WOULD BE BETTER OFF DEAD, OR OF HURTING YOURSELF: 0
SUM OF ALL RESPONSES TO PHQ QUESTIONS 1-9: 10
SUM OF ALL RESPONSES TO PHQ QUESTIONS 1-9: 10
4. FEELING TIRED OR HAVING LITTLE ENERGY: 0
SUM OF ALL RESPONSES TO PHQ9 QUESTIONS 1 & 2: 6
7. TROUBLE CONCENTRATING ON THINGS, SUCH AS READING THE NEWSPAPER OR WATCHING TELEVISION: 3
2. FEELING DOWN, DEPRESSED OR HOPELESS: 3

## 2023-01-24 NOTE — PROGRESS NOTES
Subjective:      Chief Complaint   Patient presents with    Anxiety     Update short term disability, scope result and MRI of shoulder results     Other     Still has spot on leg, has done otc meds and still coming back, not getting bigger         Kalie Ramirez is a 46 y.o. male     Anxiety/insomnia    Upsetting that still having anxiety   Seems to have social anxiety   Agoraphobia    Relying on xanax to get through things still    Has appt with psychiatry    Doesn't want to continue counseling    Continues to suggest he change jobs    Wt Readings from Last 3 Encounters:   01/24/23 237 lb 9.6 oz (107.8 kg)   01/06/23 215 lb (97.5 kg)   01/06/23 210 lb (95.3 kg)         Past Medical History:   Diagnosis Date    Chronic back pain     GERD (gastroesophageal reflux disease)     Heartburn     HTN (hypertension)      Past Surgical History:   Procedure Laterality Date    APPENDECTOMY      BACK SURGERY      COLONOSCOPY N/A 4/16/2021    COLORECTAL CANCER SCREENING, NOT HIGH RISK performed by Samantha Roa MD at [place] [address] ARTHROSCOPY Left 9/23/2020    ARTHROSCOPY LEFT KNEE, PARTIAL MEDIAL MENISECTOMY performed by Iqra Ramos MD at [facility] 25  2005    TONSILLECTOMY      UPPER GASTROINTESTINAL ENDOSCOPY  2008    Hiatal hernia, normal stomach, normal duodenum    UPPER GASTROINTESTINAL ENDOSCOPY N/A 1/6/2023    EGD ESOPHAGOGASTRODUODENOSCOPY performed by Sam[facility]a MD at [facility]     Family History   Problem Relation Age of Onset    High Blood Pressure Father     Diabetes Father     Crohn's Disease Brother     Coronary Art Dis Neg Hx     Colon Cancer Neg Hx      Social History     Socioeconomic History    Marital status:      Spouse name: None    Number of children: None    Years of education: None    Highest education level: None   Tobacco Use    Smoking status: Former     Packs/day: 0.00     Years: 5.00     Pack years: 0.00     Types: Cigarettes     Quit date: 2007     Years since quittin.0    Smokeless tobacco: Never    Tobacco comments:     Vapor   Vaping Use    Vaping Use: Former    Substances: Nicotine, Flavoring   Substance and Sexual Activity    Alcohol use: Not Currently     Comment: occasionally    Drug use: No     Social Determinants of Health     Financial Resource Strain: Low Risk     Difficulty of Paying Living Expenses: Not hard at all   Food Insecurity: No Food Insecurity    Worried About [facility] in the Last Year: Never true    [facility] in the Last Year: Never true   Transportation Needs: No Transportation Needs    Lack of Transportation (Medical): No    Lack of Transportation (Non-Medical): No     Current Outpatient Medications   Medication Sig Dispense Refill    escitalopram (LEXAPRO) 20 MG tablet Take 1 tablet by mouth daily 30 tablet 5    amLODIPine (NORVASC) 5 MG tablet Take 1 tab by mouth daily 90 tablet 3    omeprazole (PRILOSEC) 20 MG delayed release capsule Take one cap by mouth daily 90 capsule 3     No current facility-administered medications for this visit. No Known Allergies      Review of Systems:   General ROS: weight loss   Respiratory ROS: no cough, shortness of breath, or wheezing  Cardiovascular ROS: no chest pain or dyspnea on exertion  Gastrointestinal ROS: no abdominal pain, change in bowel habits, or black or bloody stools  Genito-Urinary ROS: no dysuria, trouble voiding, or hematuria  Musculoskeletal ROS: chronic low back pain  Neurological ROS: negative for - behavioral changes, memory loss, numbness/tingling, tremors or weakness    Exercise: walking at work, not really with extra exercise    In general patient otherwise reports feeling well.        Objective:     Physical Exam:  /84 (Site: Right Upper Arm)   Pulse 77   Temp 98 °F (36.7 °C)   Ht 6' 2\" (1.88 m)   Wt 237 lb 9.6 oz (107.8 kg)   SpO2 100%   BMI 30.51 kg/m²       Gen: Well, NAD, Alert, Oriented x 3 HEENT: EOMI, eyes clear, MMM  Skin: without rash or jaundice  Neck: no significant lymphadenopathy or thyromegaly  Lungs: CTA B w/out Rales/Wheezes/Rhonchi, Good respiratory effort   Heart: RRR, S1S2, w/out M/R/G, non-displaced PMI   Neuro: Neurovascularly intact w/ Sensory/Motor intact UE/LE Bilaterally. Psych: some anxiety     Assessment:      Diagnosis Orders   1. Anxiety  escitalopram (LEXAPRO) 20 MG tablet      2. Skin lesion  Rio Tavarez MD, Dermatology, [place]      3. Sprain of right shoulder, unspecified shoulder sprain type, initial encounter                 Plan:        Will need to see psychiatry    Seeing ortho for shoulder     Xanax prn, call for refill   Lexapro  20mg    Hydroxyzine PRN    Psychiatry referral    Has been off work since 11/16    Update medical disability     For now will remain out until can see psychiatry and hopefully optimize medication regimen to help anxiety             Nik Montes MD

## 2023-01-27 ENCOUNTER — OFFICE VISIT (OUTPATIENT)
Dept: ORTHOPEDIC SURGERY | Age: 53
End: 2023-01-27
Payer: COMMERCIAL

## 2023-01-27 VITALS
HEART RATE: 86 BPM | TEMPERATURE: 98.3 F | WEIGHT: 237 LBS | BODY MASS INDEX: 30.42 KG/M2 | HEIGHT: 74 IN | OXYGEN SATURATION: 98 %

## 2023-01-27 DIAGNOSIS — S46.011D STRAIN OF RIGHT ROTATOR CUFF CAPSULE, SUBSEQUENT ENCOUNTER: Primary | ICD-10-CM

## 2023-01-27 PROCEDURE — 99214 OFFICE O/P EST MOD 30 MIN: CPT | Performed by: PHYSICIAN ASSISTANT

## 2023-01-27 RX ORDER — MELOXICAM 15 MG/1
15 TABLET ORAL DAILY PRN
Qty: 30 TABLET | Refills: 0 | Status: SHIPPED | OUTPATIENT
Start: 2023-01-27

## 2023-01-27 RX ORDER — CLONAZEPAM 1 MG/1
TABLET ORAL
COMMUNITY
Start: 2023-01-26

## 2023-01-27 NOTE — PROGRESS NOTES
Feliberto Dejesus (:  1970) is a 46 y.o. male,Established patient, here for evaluation of the following chief complaint(s):  Follow-up (Pt is follow up for MRI Results)         ASSESSMENT/PLAN:  1. Strain of right rotator cuff capsule, subsequent encounter  -     Ambulatory referral to Orthopedic Surgery      No follow-ups on file. Subjective   SUBJECTIVE/OBJECTIVE:  Is a 59-year-old male following up for complaints of right shoulder pain. He was working out doing push-ups about 3 months ago and developed right shoulder pain. I injected the shoulder with cortisone which improved his pain however he continues to have weakness in the right shoulder. He states he has to do push-ups and pull-ups for work and is not capable to perform those tasks. He has had an MRI scan and is here for results. Review of Systems   Constitutional: Negative. HENT: Negative. Respiratory: Negative. Skin: Negative. Neurological: Negative. Objective   EXAM: MRI SHOULDER RIGHT W CONTRAST       HISTORY:  S46.011D Strain of right rotator cuff capsule, subsequent encounter ICD10       TECHNIQUE: Multiplanar multisequence MRI of the right shoulder was performed without contrast.       COMPARISON:  None available. FINDINGS:       There is mild to moderate degenerative change of the acromioclavicular joint. An osteophyte is noted traversing inferiorly from the acromion clavicular joint with mild mass effect on the supraspinatus tendon and supraspinatus muscle. Acromion is flat. Coracoclavicular ligament intact. No subacromial/subdeltoid bursal fluid or edema. There is tearing of the subscapularis tendon close to the attachment into the humerus. There is mild to moderate intrasubstance tearing of the supraspinatus tendon near the attachment to the humerus. No atrophy or fatty infiltration of the rotator cuff    musculature.        The intra-articular and extra-articular long head biceps tendon is intact, though there is slight increased signal within the long head tendon itself which may represent tendinitis versus partial mild tearing. The biceps tendon resides within the    bicipital groove. There is subtle inferior labral degenerative tearing. No well-defined or measurable cartilage defect identified. No glenohumeral joint effusion. Impression           1. Tearing of the subscapularis tendon at the attachment into the humerus without complete detachment. 2. Intrasubstance tearing of the supraspinatus tendon near the attachment to the humerus without complete detachment. 3. Moderate degenerative change of the acromioclavicular joint with osteophyte inferiorly having mild mass effect on the supraspinatus tendon and supraspinatus muscle. Physical Exam  Musculoskeletal:      Comments: Right shoulder-no acromioclavicular, clavicle, SC joint tenderness with palpation. Abduction and abduction strength is symmetrical.  Internal rotation is 0 degrees, external rotation is 120 degrees about 10 degrees less than the left. Supraspinatus test elicits pain but no specific weakness. Apprehension sign is negative. Biceps contour is normal.  Barry's test does elicit some thumbs down pain. Sensation is intact distally to light touch. Explained the MRI to the patient. Explained that he has tried conservative treatment including cortisone injection as well as physical therapy. His MRI does show a acromioclavicular joint osteophyte putting pressure on the supraspinatus tendon and supraspinatus muscle. He does show intersubstance tearing of the supraspinatus tendon done. I believe he is a candidate for surgery. He will meet with Dr. Eddy Orellana to discuss surgical options.        On this date 1/27/2023 I have spent 35 minutes reviewing previous notes, test results and face to face with the patient discussing the diagnosis and importance of compliance with the treatment plan as well as documenting on the day of the visit. An electronic signature was used to authenticate this note.     --ZHANG Ochoa

## 2023-01-30 ENCOUNTER — PREP FOR PROCEDURE (OUTPATIENT)
Dept: ORTHOPEDIC SURGERY | Age: 53
End: 2023-01-30

## 2023-01-30 ENCOUNTER — OFFICE VISIT (OUTPATIENT)
Dept: ORTHOPEDIC SURGERY | Age: 53
End: 2023-01-30
Payer: COMMERCIAL

## 2023-01-30 ENCOUNTER — TELEPHONE (OUTPATIENT)
Dept: ORTHOPEDIC SURGERY | Age: 53
End: 2023-01-30

## 2023-01-30 VITALS — OXYGEN SATURATION: 98 % | WEIGHT: 235.5 LBS | HEIGHT: 74 IN | BODY MASS INDEX: 30.22 KG/M2

## 2023-01-30 DIAGNOSIS — M75.121 COMPLETE TEAR OF RIGHT ROTATOR CUFF, UNSPECIFIED WHETHER TRAUMATIC: Primary | ICD-10-CM

## 2023-01-30 DIAGNOSIS — M75.41 IMPINGEMENT SYNDROME OF RIGHT SHOULDER: ICD-10-CM

## 2023-01-30 PROCEDURE — 99214 OFFICE O/P EST MOD 30 MIN: CPT | Performed by: STUDENT IN AN ORGANIZED HEALTH CARE EDUCATION/TRAINING PROGRAM

## 2023-01-30 ASSESSMENT — ENCOUNTER SYMPTOMS
EYES NEGATIVE: 1
ALLERGIC/IMMUNOLOGIC NEGATIVE: 1
RESPIRATORY NEGATIVE: 1
RESPIRATORY NEGATIVE: 1
GASTROINTESTINAL NEGATIVE: 1

## 2023-01-30 NOTE — TELEPHONE ENCOUNTER
Surgery Scheduling and Authorization Information    Surgery Date:2/8/2023  CPT Code(s) 59784, 50987  Procedure(s) Right Arthroscopic rotator cuff repair, subacromial decompression      Approved [] Not Required [x] Denied []  Call Reference # [id number]    How authorization obtained:  [] web portal             [X] via phone       [] Via fax    Provider  [] Gerry Dasilva  [] David Lisa  [] [facility]  [] Tona Ann  [] Brayan Cole  [] Sarah Rowe  [] [facility]  [x] Kieran Bowers  [] Cisco Campuzano  [] Estrada Heard  [] Caleb Jain        Surgery Sheet Faxed to Scheduling [x]  Surgery and Prior Authorization Information Sheet Scanned [x]

## 2023-01-30 NOTE — PROGRESS NOTES
Orthopedic Surgery and Sports Medicine    Subjective:      Patient ID: Edwin Reese is a 46 y.o. male who presents today for:  Chief Complaint   Patient presents with    Establish Care     Here for exam for Strain of right rotator cuff capsule and consult for surgery       HPI  Farida Alvares is a 60-year-old male who presents today for evaluation of his right shoulder pain. He has been previously following with Jeremy Chávez PA-C. He reports pain in the right shoulder since September 2022. He states that he was training for work with a lot of pull-ups, push-ups, lifting and he felt immediate pain in the shoulder. He states that he was not able to lift the arm for period of time. He began physical therapy and had some improvement. He also tried anti-inflammatory medication and a cortisone injection. These did help his symptoms. However he continues to have significant pain in the anterolateral portion of the shoulder, worse with certain activities such as reaching overhead. 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 He has not been able to do this because of his shoulder. He must also carry a firearm. He does have pain at night if he sleeps on the right shoulder. He does have a history of neck and lower back surgery. He denies any neck pain and denies any radicular pain or numbness in the right upper extremity. Previous treatment: Formal physical therapy, home exercise program, activity modification, anti-inflammatory medication, cortisone injection by Jeremy Chávez on 11/11/22. NSAIDs: Yes meloxicam  Physical therapy: Yes    Hand Dominance: right  Occupation: Executive protection  Workers Compensation:   Have you missed work for this issue? No  Is this issue being addressed under a worker's compensation claim?  No    Past Medical History:   Diagnosis Date    Chronic back pain     GERD (gastroesophageal reflux disease)     Heartburn     HTN (hypertension)       Past Surgical History:   Procedure Laterality Date    APPENDECTOMY      BACK SURGERY      COLONOSCOPY N/A 2021    COLORECTAL CANCER SCREENING, NOT HIGH RISK performed by Mundo Jorgensen MD at [facility] ARTHROSCOPY Left 2020    ARTHROSCOPY LEFT KNEE, PARTIAL MEDIAL MENISECTOMY performed by Cyndi Jj MD at [place] [address] CYST EXCISION  2005    TONSILLECTOMY      UPPER GASTROINTESTINAL ENDOSCOPY  2008    Hiatal hernia, normal stomach, normal duodenum    UPPER GASTROINTESTINAL ENDOSCOPY N/A 2023    EGD ESOPHAGOGASTRODUODENOSCOPY performed by Mundo Jorgensen MD at [address] [place] [address] History    Marital status:      Spouse name: Not on file    Number of children: Not on file    Years of education: Not on file    Highest education level: Not on file   Occupational History    Not on file   Tobacco Use    Smoking status: Former     Packs/day: 0.00     Years: 5.00     Pack years: 0.00     Types: Cigarettes     Quit date: 2007     Years since quittin.0    Smokeless tobacco: Never    Tobacco comments:     Vapor   Vaping Use    Vaping Use: Former    Substances: Nicotine, Flavoring   Substance and Sexual Activity    Alcohol use: Not Currently     Comment: occasionally    Drug use: No    Sexual activity: Not on file   Other Topics Concern    Not on file   Social History Narrative    Not on file     Social Determinants of Health     Financial Resource Strain: Low Risk     Difficulty of Paying Living Expenses: Not hard at all   Food Insecurity: No Food Insecurity    Worried About [address] in the Last Year: Never true    [address] [facility] [address] in the Last Year: Never true   Transportation Needs: No Transportation Needs    Lack of Transportation (Medical): No    Lack of Transportation (Non-Medical):  No   Physical Activity: Not on file   Stress: Not on file   Social Connections: Not on file   Intimate Partner Violence: Not on file   Housing Stability: Not on file     Family History   Problem Relation Age of Onset    High Blood Pressure Father     Diabetes Father     Crohn's Disease Brother     Coronary Art Dis Neg Hx     Colon Cancer Neg Hx      No Known Allergies  Current Outpatient Medications on File Prior to Visit   Medication Sig Dispense Refill    clonazePAM (KLONOPIN) 1 MG tablet       meloxicam (MOBIC) 15 MG tablet Take 1 tablet by mouth daily as needed for Pain 30 tablet 0    escitalopram (LEXAPRO) 20 MG tablet Take 1 tablet by mouth daily 30 tablet 5    amLODIPine (NORVASC) 5 MG tablet Take 1 tab by mouth daily 90 tablet 3    omeprazole (PRILOSEC) 20 MG delayed release capsule Take one cap by mouth daily 90 capsule 3     No current facility-administered medications on file prior to visit. Review of Systems   Constitutional: Negative. HENT: Negative. Eyes: Negative. Respiratory: Negative. Cardiovascular: Negative. Gastrointestinal: Negative. Endocrine: Negative. Genitourinary: Negative. Musculoskeletal:  Positive for arthralgias and myalgias. Skin: Negative. Allergic/Immunologic: Negative. Neurological: Negative. Hematological: Negative. Psychiatric/Behavioral: Negative. Objective:   Ht 6' 2\" (1.88 m)   Wt 235 lb 8 oz (106.8 kg)   SpO2 98%   BMI 30.24 kg/m²     Physical Exam:  Ortho Exam    Physical exam of the cervical spine:  decreased ROM and no palpable tenderness  Negative radiculopathy. Physical exam of the right shoulder: There is not swelling and deformity of the shoulder girdle. There is not a peter sign. There is not muscular atrophy present. There is not scapular winging. Negative sulcus sign. No tenderness to palpation over the [facility] joint. Active and passive range of motion of the shoulder is equal.    Active range of motion of the shoulder is normal. Forward flexion: 160. External rotation at side: 40.  Internal rotation: L1. There is pain with end ranges of motion. There is not crepitus. Strength: 5/5 deltoid. 5/5 biceps. 5/5 triceps. 5/5 supraspinatus. 5/5 infraspinatus. 5/5 teres minor. Negative Hornblower's sign. Negative external rotation lag sign. 5/5 subscapularis. Negative internal rotation lag sign. Negative belly press. Negative lift off test.   Special/provocative tests: Positive impingement signs. Neer impingement sign positive. Fisher test positive. Negative Karen's test. There is not pain with cross body adduction. Negative Dayton's test. Negative Speed's test. Negative Yergason's sign. Instability: N/a  Sensation intact to light touch along the C4-T1 distribution: normal.   Radial pulse is normal and symmetric. Exam of the contralateral left shoulder: within normal limits. Radiographs and Laboratory Studies:     Diagnostic Imaging Studies:    MRI of the right shoulder dated 1/16/2023 was reviewed by me and demonstrates a full-thickness tear of the supraspinatus tendon at the anterior portion of the footprint without retraction. The infraspinatus is intact. There is tearing and tendinosis of the subscapularis without retraction. There is good muscle bulk in the rotator cuff. Moderate degenerative changes of the AC joint. There is an osteophyte inferior to the [facility] joint and inferior to the acromion causing impingement and mass-effect on the supraspinatus. Laboratory Studies:   Lab Results   Component Value Date    WBC 6.9 09/27/2022    HGB 14.6 09/27/2022    HCT 42.9 09/27/2022    MCV 90.6 09/27/2022     09/27/2022     No results found for: Deatra Flow  No results found for: CRP    Assessment:      Diagnosis Orders   1. Complete tear of right rotator cuff, unspecified whether traumatic        2.  Impingement syndrome of right shoulder          Feliberto Dejesus is a 46 y.o. male with a history and exam consistent with right shoulder rotator cuff tear and subacromial impingement that has failed 4 months of nonoperative treatment. This is an acute exacerbation of a chronic condition . Plan:     I had a discussion with the patient regarding his exam, MRI findings, and diagnosis. He continues to report significant pain in the shoulder and limitations with his shoulder. He needs to be able to do pull-ups and push-ups for his job and he has not been able to. He has exhausted conservative treatments including activity modification, formal physical therapy, home exercise program, anti-inflammatory medication, and a cortisone injection. His MRI shows a full-thickness tear of the supraspinatus at the anterior portion of the footprint without retraction. There is some tearing and tendinosis of the subscapularis. He also has positive impingement signs and his MRI shows an osteophyte inferior to the [facility] joint and acromion. We discussed surgical intervention including right shoulder arthroscopic rotator cuff repair with subacromial decompression. We discussed the operation in detail as well as the postoperative course. This would be an outpatient surgery done under general anesthesia and with a regional block. It will be done in the beachchair position. He will be in a sling for 6 weeks postoperatively. He will participate in a graduated physical rehabilitation protocol. We discussed that it may take up to 12 months for full recovery from his surgery. We discussed restrictions regarding his job postoperatively. The patient is in agreement and would like to proceed with surgery. Risks, benefits and alternatives of surgery were discussed including but not limited to infection, bleeding, neurovascular injury, persistent pain or dysfunction, and long-term disability. Cardiovascular pulmonary complications from anesthesia may include but are not limited to death and DVT. Patient accepts these risks and would like to proceed with surgical intervention.     Plan for outpatient surgery on 2023 at [facility].    Return for post-op visit. Gui Butt MD         [address] [place] [address] and Sports Medicine  Ph: [phone]       Ph: [phone]   Fx: [phone]       Fx: [phone]    Surgery Scheduling, Pre-Op and Post-Op Information Form  Call to [facility] at [phone] at least 24 hours Prior to Procedure Date     Surgery Location: [facility] Surgery: [address] [place] [address]    OFFICE   Surgeon: Dana Byrd MD  Surgery Date: 23  Time: 3rd or 4th case   Patient: Mariel Dejesus   : 1970   #: [id number]  Gender: male  Home Phone: [phone] Mobile Phone: [phone]  Emergency Contact: Otto Dyer Phone: [phone]  Insurance Co: Payor: [place] /  /  /  ID No:  [id number]        PROVIDER  Diagnosis: Right shoulder rotator cuff tear, subacromial impingement  Procedure/Consent: Arthroscopic rotator cuff repair, subacromial decompression  CPT CODES: 00081, 10319  Case Comments/Implants: [facility] chair, arthrex speed bride, tino   Surgery Schedule Type: Outpatient  Anesthesia Requested: General and Regional Block  Referring Family Doctor: Tami Woods MD     PAT  [x] [facility] PAT Date/Time: _________________________________________________________  History & Physical: [] Physician will Provide [] Attached [] Dictated [] Other  [x] Follow Anesthesia Pre-Op Orders for X-rays, [facility]     [x] SN & PT to evaluate and treat/educate disease management, medications, home safety & equipment needs for total joint patients  [] Other: ____________________________________________________  Consults: Medical/Cardiac Clearance done by  ____________________    PRE-OP ORDERS:  Allergies: Patient has no known allergies.  Latex Allergies: _____  Diabetic: _____  [] IV ________________________  [x] IV Start with J-loop     Preprinted Orders: Attached [] Yes [] No   Antibiotic Pre-Op: [x] ANCEF 2 gram IVPB if > 120 kg 3 grams IVPB within 1 hr. of Incision, if Allergic, use Vancomycin 1 gram IV, 2 hrs.  Pre-op  [] TXA Protocol [] Other:   [x] NPO   [] Betablocker (if needed) _____________________________________   [] Knee high anti-embolic hose [] Thigh high anti-embolic hose   Other: ______________________________________________________    Physician Signature Required:  Date/Time: 1/30/2023

## 2023-02-01 ENCOUNTER — OFFICE VISIT (OUTPATIENT)
Dept: ORTHOPEDIC SURGERY | Age: 53
End: 2023-02-01
Payer: COMMERCIAL

## 2023-02-01 ENCOUNTER — OFFICE VISIT (OUTPATIENT)
Dept: FAMILY MEDICINE CLINIC | Age: 53
End: 2023-02-01

## 2023-02-01 VITALS — WEIGHT: 244 LBS | TEMPERATURE: 97.8 F | BODY MASS INDEX: 31.32 KG/M2 | HEIGHT: 74 IN

## 2023-02-01 VITALS
HEIGHT: 74 IN | TEMPERATURE: 97.4 F | WEIGHT: 244 LBS | SYSTOLIC BLOOD PRESSURE: 126 MMHG | DIASTOLIC BLOOD PRESSURE: 72 MMHG | HEART RATE: 76 BPM | OXYGEN SATURATION: 98 % | BODY MASS INDEX: 31.32 KG/M2

## 2023-02-01 DIAGNOSIS — Z01.818 PRE-OP EXAM: Primary | ICD-10-CM

## 2023-02-01 DIAGNOSIS — M75.121 COMPLETE TEAR OF RIGHT ROTATOR CUFF, UNSPECIFIED WHETHER TRAUMATIC: ICD-10-CM

## 2023-02-01 DIAGNOSIS — D49.2 ABNORMAL SKIN GROWTH: Primary | ICD-10-CM

## 2023-02-01 DIAGNOSIS — Z01.818 PRE-OP EXAM: ICD-10-CM

## 2023-02-01 LAB
ANION GAP SERPL CALCULATED.3IONS-SCNC: 10 MEQ/L (ref 9–15)
BASOPHILS ABSOLUTE: 0 K/UL (ref 0–0.2)
BASOPHILS RELATIVE PERCENT: 0.6 %
BUN BLDV-MCNC: 13 MG/DL (ref 6–20)
CALCIUM SERPL-MCNC: 8.9 MG/DL (ref 8.5–9.9)
CHLORIDE BLD-SCNC: 102 MEQ/L (ref 95–107)
CO2: 27 MEQ/L (ref 20–31)
CREAT SERPL-MCNC: 0.67 MG/DL (ref 0.7–1.2)
EOSINOPHILS ABSOLUTE: 0.1 K/UL (ref 0–0.7)
EOSINOPHILS RELATIVE PERCENT: 1.3 %
GFR SERPL CREATININE-BSD FRML MDRD: >60 ML/MIN/{1.73_M2}
GLUCOSE BLD-MCNC: 95 MG/DL (ref 70–99)
HCT VFR BLD CALC: 42.8 % (ref 42–52)
HEMOGLOBIN: 14.2 G/DL (ref 14–18)
LYMPHOCYTES ABSOLUTE: 1.2 K/UL (ref 1–4.8)
LYMPHOCYTES RELATIVE PERCENT: 20.1 %
MCH RBC QN AUTO: 30.6 PG (ref 27–31.3)
MCHC RBC AUTO-ENTMCNC: 33.2 % (ref 33–37)
MCV RBC AUTO: 92.2 FL (ref 79–92.2)
MONOCYTES ABSOLUTE: 0.4 K/UL (ref 0.2–0.8)
MONOCYTES RELATIVE PERCENT: 6.6 %
NEUTROPHILS ABSOLUTE: 4.4 K/UL (ref 1.4–6.5)
NEUTROPHILS RELATIVE PERCENT: 71.4 %
PDW BLD-RTO: 14.2 % (ref 11.5–14.5)
PLATELET # BLD: 269 K/UL (ref 130–400)
POTASSIUM SERPL-SCNC: 4.9 MEQ/L (ref 3.4–4.9)
RBC # BLD: 4.65 M/UL (ref 4.7–6.1)
SODIUM BLD-SCNC: 139 MEQ/L (ref 135–144)
WBC # BLD: 6.1 K/UL (ref 4.8–10.8)

## 2023-02-01 PROCEDURE — 3078F DIAST BP <80 MM HG: CPT | Performed by: PHYSICIAN ASSISTANT

## 2023-02-01 PROCEDURE — 3074F SYST BP LT 130 MM HG: CPT | Performed by: PHYSICIAN ASSISTANT

## 2023-02-01 PROCEDURE — 99213 OFFICE O/P EST LOW 20 MIN: CPT | Performed by: PHYSICIAN ASSISTANT

## 2023-02-01 PROCEDURE — 93010 ELECTROCARDIOGRAM REPORT: CPT | Performed by: PHYSICIAN ASSISTANT

## 2023-02-01 PROCEDURE — 93000 ELECTROCARDIOGRAM COMPLETE: CPT | Performed by: PHYSICIAN ASSISTANT

## 2023-02-01 RX ORDER — SODIUM CHLORIDE 9 MG/ML
INJECTION, SOLUTION INTRAVENOUS PRN
OUTPATIENT
Start: 2023-02-01

## 2023-02-01 RX ORDER — SODIUM CHLORIDE 0.9 % (FLUSH) 0.9 %
5-40 SYRINGE (ML) INJECTION PRN
OUTPATIENT
Start: 2023-02-01

## 2023-02-01 RX ORDER — SODIUM CHLORIDE 0.9 % (FLUSH) 0.9 %
5-40 SYRINGE (ML) INJECTION EVERY 12 HOURS SCHEDULED
OUTPATIENT
Start: 2023-02-01

## 2023-02-01 RX ORDER — CHLORHEXIDINE GLUCONATE 213 G/1000ML
SOLUTION TOPICAL
Qty: 118 ML | Refills: 0 | Status: SHIPPED | OUTPATIENT
Start: 2023-02-01

## 2023-02-01 SDOH — ECONOMIC STABILITY: INCOME INSECURITY: HOW HARD IS IT FOR YOU TO PAY FOR THE VERY BASICS LIKE FOOD, HOUSING, MEDICAL CARE, AND HEATING?: NOT HARD AT ALL

## 2023-02-01 SDOH — ECONOMIC STABILITY: FOOD INSECURITY: WITHIN THE PAST 12 MONTHS, THE FOOD YOU BOUGHT JUST DIDN'T LAST AND YOU DIDN'T HAVE MONEY TO GET MORE.: NEVER TRUE

## 2023-02-01 SDOH — ECONOMIC STABILITY: HOUSING INSECURITY
IN THE LAST 12 MONTHS, WAS THERE A TIME WHEN YOU DID NOT HAVE A STEADY PLACE TO SLEEP OR SLEPT IN A SHELTER (INCLUDING NOW)?: NO

## 2023-02-01 SDOH — ECONOMIC STABILITY: FOOD INSECURITY: WITHIN THE PAST 12 MONTHS, YOU WORRIED THAT YOUR FOOD WOULD RUN OUT BEFORE YOU GOT MONEY TO BUY MORE.: NEVER TRUE

## 2023-02-01 ASSESSMENT — ENCOUNTER SYMPTOMS: COLOR CHANGE: 1

## 2023-02-01 NOTE — H&P
Subjective:     Patient is a 46 y.o.  male presented with a history of rotator cuff tear of the right shoulder. Onset of symptoms was abrupt 4 months ago with gradually worsening course since that time. He is being admitted for surgical management of this condition. The indications for the procedure include MRI scan showing rotator cuff tear. Decreased strength in the right shoulder. Patient is having right arthroscopic rotator cuff repair with subacromial decompression to be performed by Dr. Denise Philip February 8, 2023 at [facility]. Patient Active Problem List    Diagnosis Date Noted    Complete tear of right rotator cuff 01/30/2023    Subacromial impingement, right 01/30/2023    Acute medial meniscus tear, left, initial encounter 09/09/2020    Arthritis of left knee 09/09/2020    Obesity 06/07/2016    Hypertension 08/01/2013    GERD (gastroesophageal reflux disease) 08/01/2013    Chronic back pain      Past Medical History:   Diagnosis Date    Chronic back pain     GERD (gastroesophageal reflux disease)     Heartburn     HTN (hypertension)       Past Surgical History:   Procedure Laterality Date    APPENDECTOMY      BACK SURGERY      COLONOSCOPY N/A 4/16/2021    COLORECTAL CANCER SCREENING, NOT HIGH RISK performed by Armand Steiner MD at [facility] 23 ARTHROSCOPY Left 9/23/2020    ARTHROSCOPY LEFT KNEE, PARTIAL MEDIAL MENISECTOMY performed by Carmella Son MD at [facility] 25 2005    TONSILLECTOMY      UPPER GASTROINTESTINAL ENDOSCOPY  2008    Hiatal hernia, normal stomach, normal duodenum    UPPER GASTROINTESTINAL ENDOSCOPY N/A 1/6/2023    EGD ESOPHAGOGASTRODUODENOSCOPY performed by Armand Steiner MD at [facility]      Not in a hospital admission.   No Known Allergies   Social History     Tobacco Use    Smoking status: Former     Packs/day: 0.00     Years: 5.00     Pack years: 0.00     Types: Cigarettes     Quit date: 1/1/2007 Years since quittin.0    Smokeless tobacco: Never    Tobacco comments:     Vapor   Substance Use Topics    Alcohol use: Not Currently     Comment: occasionally      Family History   Problem Relation Age of Onset    High Blood Pressure Father     Diabetes Father     Crohn's Disease Brother     Coronary Art Dis Neg Hx     Colon Cancer Neg Hx       Review of Systems  A comprehensive review of systems was negative except for: Musculoskeletal: positive for right shoulder pain    Objective:     @IPVITALS[8@  /72 (Site: Left Upper Arm, Position: Sitting)   Pulse 76   Temp 97.4 °F (36.3 °C) (Temporal)   Ht 6' 2\" (1.88 m)   Wt 244 lb (110.7 kg)   SpO2 98%   BMI 31.33 kg/m²   General appearance: alert, appears stated age, and cooperative  Head: Normocephalic, without obvious abnormality, atraumatic  Eyes: conjunctivae/corneas clear. PERRL, EOM's intact. Fundi benign. Ears: normal TM's and external ear canals both ears  Nose: Nares normal. Septum midline. Mucosa normal. No drainage or sinus tenderness. Throat: lips, mucosa, and tongue normal; teeth and gums normal  Neck: no adenopathy, no carotid bruit, no JVD, supple, symmetrical, trachea midline, and thyroid not enlarged, symmetric, no tenderness/mass/nodules  Back: symmetric, no curvature. ROM normal. No CVA tenderness.   Lungs: clear to auscultation bilaterally  Chest wall: no tenderness  Heart: regular rate and rhythm, S1, S2 normal, no murmur, click, rub or gallop  Abdomen: soft, non-tender; bowel sounds normal; no masses,  no organomegaly  Extremities: extremities normal, atraumatic, no cyanosis or edema  Pulses: 2+ and symmetric  Skin: Skin color, texture, turgor normal. No rashes or lesions  Lymph nodes: Cervical, supraclavicular, and axillary nodes normal.  Neurologic: Grossly normal    Imaging Review  EXAM: MRI SHOULDER RIGHT W CONTRAST       HISTORY:  S46.011D Strain of right rotator cuff capsule, subsequent encounter ICD10       TECHNIQUE: Multiplanar multisequence MRI of the right shoulder was performed without contrast.       COMPARISON:  None available. FINDINGS:       There is mild to moderate degenerative change of the acromioclavicular joint. An osteophyte is noted traversing inferiorly from the acromion clavicular joint with mild mass effect on the supraspinatus tendon and supraspinatus muscle. Acromion is flat. Coracoclavicular ligament intact. No subacromial/subdeltoid bursal fluid or edema. There is tearing of the subscapularis tendon close to the attachment into the humerus. There is mild to moderate intrasubstance tearing of the supraspinatus tendon near the attachment to the humerus. No atrophy or fatty infiltration of the rotator cuff    musculature. The intra-articular and extra-articular long head biceps tendon is intact, though there is slight increased signal within the long head tendon itself which may represent tendinitis versus partial mild tearing. The biceps tendon resides within the    bicipital groove. There is subtle inferior labral degenerative tearing. No well-defined or measurable cartilage defect identified. No glenohumeral joint effusion. Impression           1. Tearing of the subscapularis tendon at the attachment into the humerus without complete detachment. 2. Intrasubstance tearing of the supraspinatus tendon near the attachment to the humerus without complete detachment. 3. Moderate degenerative change of the acromioclavicular joint with osteophyte inferiorly having mild mass effect on the supraspinatus tendon and supraspinatus muscle. EKG performed today reviewed by me shows sinus rhythm rate of 70. No acute changes    Assessment:     Active Problems:    * No active hospital problems. *  Resolved Problems:    * No resolved hospital problems. *      Plan:     The various methods of treatment have been discussed with the patient and family.    After consideration of risks, benefits and other options for treatment, the patient has consented to surgical interventions (arthroscopy right shoulder with rotator cuff repair and subacromial decompression). Questions were answered and Pre-op teaching was done by myself. Explained to the patient he has to be n.p.o. midnight tonight before surgery. He may take his amlodipine and omeprazole the morning of surgery with sips of water. He will bathe daily with Hibiclens soap 3 days prior to surgery.

## 2023-02-01 NOTE — PATIENT INSTRUCTIONS
Lesion was excised due to failing chemical treatment recommended by PCP    Incision/Laceration repair    -Clean surgical area with antibacterial soap and water once daily. --You may be instructed to soak the wound with Hydrogen Peroxide to loosen scabbing around sutures, this is not to be done more often that every 3 days, should be for 30 seconds-1 min and then rinsed off with water.     -Keep surgical site moist with vaseline or antibiotic ointment (single- Bacitracin, not triple antibiotic or Neosporin) and apply a fresh bandage daily until a solid scab forms or if the wound is at risk for trauma or dirt. It is important to leave the wound uncovered part of the day to allow the skin to dry and avoid breakdown from excessive moisture. There may be exceptions to this, the staff will provide information if your wound requires a variation in this, that will often involve a prescription ointment or cream.     -Follow up immediately if any growing redness (minimal redness or pale pink is normal along wound edges) surrounds the surgical site or if dripping drainage occurs at surgical site. Once a solid scab forms no more bandage needed. A wet scab can look yellow. This is not infection, just moisture.    -Once the lesion is healed be sure to apply sunscreen to the area to prevent burn of the newer and more delicate skin especially during the first 6 months of healing.        -If the scar begins to be raised you may massage the area firmly twice a day to help break down scar tissue and help the area become a flat scar. There is some evidence that Mederma applied to a scar daily for the first few months can help shrink and fade it more quickly then without intervention.

## 2023-02-01 NOTE — H&P (VIEW-ONLY)
Subjective:     Patient is a 46 y.o.  male presented with a history of rotator cuff tear of the right shoulder. Onset of symptoms was abrupt 4 months ago with gradually worsening course since that time. He is being admitted for surgical management of this condition. The indications for the procedure include MRI scan showing rotator cuff tear. Decreased strength in the right shoulder. Patient is having right arthroscopic rotator cuff repair with subacromial decompression to be performed by Dr. Joe Cedeno February 8, 2023 at [facility]. Patient Active Problem List    Diagnosis Date Noted    Complete tear of right rotator cuff 01/30/2023    Subacromial impingement, right 01/30/2023    Acute medial meniscus tear, left, initial encounter 09/09/2020    Arthritis of left knee 09/09/2020    Obesity 06/07/2016    Hypertension 08/01/2013    GERD (gastroesophageal reflux disease) 08/01/2013    Chronic back pain      Past Medical History:   Diagnosis Date    Chronic back pain     GERD (gastroesophageal reflux disease)     Heartburn     HTN (hypertension)       Past Surgical History:   Procedure Laterality Date    APPENDECTOMY      BACK SURGERY      COLONOSCOPY N/A 4/16/2021    COLORECTAL CANCER SCREENING, NOT HIGH RISK performed by Lauren Hernandez MD at [facility] 23 ARTHROSCOPY Left 9/23/2020    ARTHROSCOPY LEFT KNEE, PARTIAL MEDIAL MENISECTOMY performed by Melvi Young MD at [facility] 25 2005    TONSILLECTOMY      UPPER GASTROINTESTINAL ENDOSCOPY  2008    Hiatal hernia, normal stomach, normal duodenum    UPPER GASTROINTESTINAL ENDOSCOPY N/A 1/6/2023    EGD ESOPHAGOGASTRODUODENOSCOPY performed by Lauren Hernandez MD at [facility]      Not in a hospital admission.   No Known Allergies   Social History     Tobacco Use    Smoking status: Former     Packs/day: 0.00     Years: 5.00     Pack years: 0.00     Types: Cigarettes     Quit date: 1/1/2007 Years since quittin.0    Smokeless tobacco: Never    Tobacco comments:     Vapor   Substance Use Topics    Alcohol use: Not Currently     Comment: occasionally      Family History   Problem Relation Age of Onset    High Blood Pressure Father     Diabetes Father     Crohn's Disease Brother     Coronary Art Dis Neg Hx     Colon Cancer Neg Hx       Review of Systems  A comprehensive review of systems was negative except for: Musculoskeletal: positive for right shoulder pain    Objective:     @IPVITALS[8@  /72 (Site: Left Upper Arm, Position: Sitting)   Pulse 76   Temp 97.4 °F (36.3 °C) (Temporal)   Ht 6' 2\" (1.88 m)   Wt 244 lb (110.7 kg)   SpO2 98%   BMI 31.33 kg/m²   General appearance: alert, appears stated age, and cooperative  Head: Normocephalic, without obvious abnormality, atraumatic  Eyes: conjunctivae/corneas clear. PERRL, EOM's intact. Fundi benign. Ears: normal TM's and external ear canals both ears  Nose: Nares normal. Septum midline. Mucosa normal. No drainage or sinus tenderness. Throat: lips, mucosa, and tongue normal; teeth and gums normal  Neck: no adenopathy, no carotid bruit, no JVD, supple, symmetrical, trachea midline, and thyroid not enlarged, symmetric, no tenderness/mass/nodules  Back: symmetric, no curvature. ROM normal. No CVA tenderness.   Lungs: clear to auscultation bilaterally  Chest wall: no tenderness  Heart: regular rate and rhythm, S1, S2 normal, no murmur, click, rub or gallop  Abdomen: soft, non-tender; bowel sounds normal; no masses,  no organomegaly  Extremities: extremities normal, atraumatic, no cyanosis or edema  Pulses: 2+ and symmetric  Skin: Skin color, texture, turgor normal. No rashes or lesions  Lymph nodes: Cervical, supraclavicular, and axillary nodes normal.  Neurologic: Grossly normal    Imaging Review  EXAM: MRI SHOULDER RIGHT W CONTRAST       HISTORY:  S46.011D Strain of right rotator cuff capsule, subsequent encounter ICD10       TECHNIQUE: Multiplanar multisequence MRI of the right shoulder was performed without contrast.       COMPARISON:  None available. FINDINGS:       There is mild to moderate degenerative change of the acromioclavicular joint. An osteophyte is noted traversing inferiorly from the acromion clavicular joint with mild mass effect on the supraspinatus tendon and supraspinatus muscle. Acromion is flat. Coracoclavicular ligament intact. No subacromial/subdeltoid bursal fluid or edema. There is tearing of the subscapularis tendon close to the attachment into the humerus. There is mild to moderate intrasubstance tearing of the supraspinatus tendon near the attachment to the humerus. No atrophy or fatty infiltration of the rotator cuff    musculature. The intra-articular and extra-articular long head biceps tendon is intact, though there is slight increased signal within the long head tendon itself which may represent tendinitis versus partial mild tearing. The biceps tendon resides within the    bicipital groove. There is subtle inferior labral degenerative tearing. No well-defined or measurable cartilage defect identified. No glenohumeral joint effusion. Impression           1. Tearing of the subscapularis tendon at the attachment into the humerus without complete detachment. 2. Intrasubstance tearing of the supraspinatus tendon near the attachment to the humerus without complete detachment. 3. Moderate degenerative change of the acromioclavicular joint with osteophyte inferiorly having mild mass effect on the supraspinatus tendon and supraspinatus muscle. EKG performed today reviewed by me shows sinus rhythm rate of 70. No acute changes    Assessment:     Active Problems:    * No active hospital problems. *  Resolved Problems:    * No resolved hospital problems. *      Plan:     The various methods of treatment have been discussed with the patient and family.    After consideration of risks, benefits and other options for treatment, the patient has consented to surgical interventions (arthroscopy right shoulder with rotator cuff repair and subacromial decompression). Questions were answered and Pre-op teaching was done by myself. Explained to the patient he has to be n.p.o. midnight tonight before surgery. He may take his amlodipine and omeprazole the morning of surgery with sips of water. He will bathe daily with Hibiclens soap 3 days prior to surgery.

## 2023-02-01 NOTE — PROGRESS NOTES
Diagnosis Orders   1. Abnormal skin growth  OH SHAVING SKIN LESION 1 S/N/H/F/G DIAM 1.1-2.0 CM    Specimen to Pathology Outpatient            Orders Placed This Encounter   Procedures    Specimen to Pathology Outpatient     Margins requested on all specimens  R/O verrucous lesion versus squamous cell cancer  Location right shin medial     Standing Status:   Future     Standing Expiration Date:   2/1/2024     Order Specific Question:   PREVIOUS BIOPSY     Answer:   No     Order Specific Question:   PREOP DIAGNOSIS     Answer:   Abnormal skin growth     Order Specific Question:   FROZEN SECTION - NO OR YES/SPECIMEN     Answer:   No    OH SHAVING SKIN LESION 1 S/N/H/F/G DIAM 1.1-2.0 CM     Right shin medial       Josiephine Manger was seen today for skin exam.    Diagnoses and all orders for this visit:    Abnormal skin growth  -     OH SHAVING SKIN LESION 1 S/N/H/F/G DIAM 1.1-2.0 CM  -     Specimen to Pathology Outpatient; Future      Return if symptoms worsen or fail to improve. Patient Instructions   Lesion was excised due to failing chemical treatment recommended by PCP    Incision/Laceration repair    -Clean surgical area with antibacterial soap and water once daily. --You may be instructed to soak the wound with Hydrogen Peroxide to loosen scabbing around sutures, this is not to be done more often that every 3 days, should be for 30 seconds-1 min and then rinsed off with water.     -Keep surgical site moist with vaseline or antibiotic ointment (single- Bacitracin, not triple antibiotic or Neosporin) and apply a fresh bandage daily until a solid scab forms or if the wound is at risk for trauma or dirt. It is important to leave the wound uncovered part of the day to allow the skin to dry and avoid breakdown from excessive moisture.  There may be exceptions to this, the staff will provide information if your wound requires a variation in this, that will often involve a prescription ointment or cream.     -Follow up immediately if any growing redness (minimal redness or pale pink is normal along wound edges) surrounds the surgical site or if dripping drainage occurs at surgical site. Once a solid scab forms no more bandage needed. A wet scab can look yellow. This is not infection, just moisture.    -Once the lesion is healed be sure to apply sunscreen to the area to prevent burn of the newer and more delicate skin especially during the first 6 months of healing.        -If the scar begins to be raised you may massage the area firmly twice a day to help break down scar tissue and help the area become a flat scar. There is some evidence that Mederma applied to a scar daily for the first few months can help shrink and fade it more quickly then without intervention. Pt has lesion on R shin that grows out from the body becomes rough and scaly. Sometimes he is able to cut part of it off. His PCP told him to use wart remover cream on it and that has not helped. It did damage the skin around it but did not encourage the lesion to peel off. He has bumped it multiple times and ends up with recurrent bleeding. Current Outpatient Medications on File Prior to Visit   Medication Sig Dispense Refill    clonazePAM (KLONOPIN) 1 MG tablet       meloxicam (MOBIC) 15 MG tablet Take 1 tablet by mouth daily as needed for Pain 30 tablet 0    escitalopram (LEXAPRO) 20 MG tablet Take 1 tablet by mouth daily 30 tablet 5    amLODIPine (NORVASC) 5 MG tablet Take 1 tab by mouth daily 90 tablet 3    omeprazole (PRILOSEC) 20 MG delayed release capsule Take one cap by mouth daily 90 capsule 3     No current facility-administered medications on file prior to visit. Patient has no known allergies. Review of Systems   Constitutional:  Negative for chills and fever. Skin:  Positive for color change. Allergic/Immunologic: Negative for environmental allergies, food allergies and immunocompromised state.    Hematological: Negative for adenopathy. Does not bruise/bleed easily. Psychiatric/Behavioral:  Negative for behavioral problems and sleep disturbance. Temp 97.8 °F (36.6 °C)   Ht 6' 2\" (1.88 m)   Wt 244 lb (110.7 kg)   BMI 31.33 kg/m²   Physical Exam  Constitutional:       General: He is not in acute distress. Appearance: Normal appearance. He is well-developed. He is not toxic-appearing. HENT:      Head: Normocephalic and atraumatic. Right Ear: Hearing and tympanic membrane normal.      Left Ear: Hearing and tympanic membrane normal.      Nose: Nose normal. No nasal deformity. Eyes:      General: Lids are normal.         Right eye: No discharge. Left eye: No discharge. Conjunctiva/sclera: Conjunctivae normal.      Pupils: Pupils are equal, round, and reactive to light. Neck:      Thyroid: No thyroid mass or thyromegaly. Vascular: No JVD. Trachea: Trachea and phonation normal.   Cardiovascular:      Rate and Rhythm: Normal rate and regular rhythm. Pulmonary:      Effort: No accessory muscle usage or respiratory distress. Musculoskeletal:      Cervical back: Full passive range of motion without pain. Comments: FROM all large muscle groups and joints as witnessed when walking to exam table, getting on, and getting off the exam table. Skin:     General: Skin is warm and dry. Findings: No rash. Comments: Right medial shin 9 mm raised hyperkeratotic lesion   Neurological:      Mental Status: He is alert. Motor: No tremor or atrophy. Gait: Gait normal.   Psychiatric:         Speech: Speech normal.         Behavior: Behavior normal.         Thought Content: Thought content normal.           PROCEDURE: Right shin final excision size 1.2 cm  Informed consent was given by the patient. Risks including infection, bleeding and scarring were discussed. No guarantee how the scar will look. Patient skin was prepped with alcohol.   Wound was anesthetized using 1.0 cc of 1% lidocaine with epinephrine for anesthesia. A Dermablade was used to obtain the complete removal of the visible lesion. drysol was used at the base of site. Bacitracin ointment and bandage were placed on the wound. Estimated blood loss less than 1 cc    Post op wound care instructions were given to the patient. He/She will f/u in 2 weeks or sooner if needed for problems. Elvira Gonsalves was seen today for skin exam.    Diagnoses and all orders for this visit:    Abnormal skin growth  -     TX SHAVING SKIN LESION 1 S/N/H/F/G DIAM 1.1-2.0 CM  -     Specimen to Pathology Outpatient; Future      Return if symptoms worsen or fail to improve. Patient Instructions   Lesion was excised due to failing chemical treatment recommended by PCP    Incision/Laceration repair    -Clean surgical area with antibacterial soap and water once daily. --You may be instructed to soak the wound with Hydrogen Peroxide to loosen scabbing around sutures, this is not to be done more often that every 3 days, should be for 30 seconds-1 min and then rinsed off with water.     -Keep surgical site moist with vaseline or antibiotic ointment (single- Bacitracin, not triple antibiotic or Neosporin) and apply a fresh bandage daily until a solid scab forms or if the wound is at risk for trauma or dirt. It is important to leave the wound uncovered part of the day to allow the skin to dry and avoid breakdown from excessive moisture. There may be exceptions to this, the staff will provide information if your wound requires a variation in this, that will often involve a prescription ointment or cream.     -Follow up immediately if any growing redness (minimal redness or pale pink is normal along wound edges) surrounds the surgical site or if dripping drainage occurs at surgical site. Once a solid scab forms no more bandage needed. A wet scab can look yellow.   This is not infection, just moisture.    -Once the lesion is healed be sure to apply sunscreen to the area to prevent burn of the newer and more delicate skin especially during the first 6 months of healing.        -If the scar begins to be raised you may massage the area firmly twice a day to help break down scar tissue and help the area become a flat scar. There is some evidence that Mederma applied to a scar daily for the first few months can help shrink and fade it more quickly then without intervention. Kavitha Morin M.D. This note was partially created with the assistance of dictation. This may lead to grammatical or spelling errors.

## 2023-02-02 ENCOUNTER — TELEPHONE (OUTPATIENT)
Dept: FAMILY MEDICINE CLINIC | Age: 53
End: 2023-02-02

## 2023-02-02 NOTE — TELEPHONE ENCOUNTER
Wife [facility] is asking for you to call her back at [phone]. In regards to the pt's short term disability paperwork.

## 2023-02-03 DIAGNOSIS — D49.2 ABNORMAL SKIN GROWTH: ICD-10-CM

## 2023-02-08 ENCOUNTER — ANESTHESIA EVENT (OUTPATIENT)
Dept: OPERATING ROOM | Age: 53
End: 2023-02-08
Payer: COMMERCIAL

## 2023-02-08 ENCOUNTER — HOSPITAL ENCOUNTER (OUTPATIENT)
Age: 53
Setting detail: OUTPATIENT SURGERY
Discharge: HOME | End: 2023-02-08
Attending: STUDENT IN AN ORGANIZED HEALTH CARE EDUCATION/TRAINING PROGRAM | Admitting: STUDENT IN AN ORGANIZED HEALTH CARE EDUCATION/TRAINING PROGRAM
Payer: COMMERCIAL

## 2023-02-08 ENCOUNTER — ANESTHESIA (OUTPATIENT)
Dept: OPERATING ROOM | Age: 53
End: 2023-02-08
Payer: COMMERCIAL

## 2023-02-08 VITALS
HEIGHT: 74 IN | SYSTOLIC BLOOD PRESSURE: 124 MMHG | OXYGEN SATURATION: 98 % | RESPIRATION RATE: 16 BRPM | HEART RATE: 66 BPM | WEIGHT: 222 LBS | BODY MASS INDEX: 28.49 KG/M2 | TEMPERATURE: 97.2 F | DIASTOLIC BLOOD PRESSURE: 75 MMHG

## 2023-02-08 DIAGNOSIS — M75.41 SUBACROMIAL IMPINGEMENT, RIGHT: ICD-10-CM

## 2023-02-08 DIAGNOSIS — M75.121 NONTRAUMATIC COMPLETE TEAR OF RIGHT ROTATOR CUFF: Primary | ICD-10-CM

## 2023-02-08 PROCEDURE — 6360000002 HC RX W HCPCS: Performed by: STUDENT IN AN ORGANIZED HEALTH CARE EDUCATION/TRAINING PROGRAM

## 2023-02-08 PROCEDURE — 6360000002 HC RX W HCPCS: Performed by: ANESTHESIOLOGY

## 2023-02-08 PROCEDURE — 6360000002 HC RX W HCPCS

## 2023-02-08 PROCEDURE — 2580000003 HC RX 258: Performed by: STUDENT IN AN ORGANIZED HEALTH CARE EDUCATION/TRAINING PROGRAM

## 2023-02-08 PROCEDURE — 7100000000 HC PACU RECOVERY - FIRST 15 MIN: Performed by: STUDENT IN AN ORGANIZED HEALTH CARE EDUCATION/TRAINING PROGRAM

## 2023-02-08 PROCEDURE — C1713 ANCHOR/SCREW BN/BN,TIS/BN: HCPCS | Performed by: STUDENT IN AN ORGANIZED HEALTH CARE EDUCATION/TRAINING PROGRAM

## 2023-02-08 PROCEDURE — 7100000011 HC PHASE II RECOVERY - ADDTL 15 MIN: Performed by: STUDENT IN AN ORGANIZED HEALTH CARE EDUCATION/TRAINING PROGRAM

## 2023-02-08 PROCEDURE — 2709999900 HC NON-CHARGEABLE SUPPLY: Performed by: STUDENT IN AN ORGANIZED HEALTH CARE EDUCATION/TRAINING PROGRAM

## 2023-02-08 PROCEDURE — 64415 NJX AA&/STRD BRCH PLXS IMG: CPT | Performed by: ANESTHESIOLOGY

## 2023-02-08 PROCEDURE — 3600000004 HC SURGERY LEVEL 4 BASE: Performed by: STUDENT IN AN ORGANIZED HEALTH CARE EDUCATION/TRAINING PROGRAM

## 2023-02-08 PROCEDURE — 7100000010 HC PHASE II RECOVERY - FIRST 15 MIN: Performed by: STUDENT IN AN ORGANIZED HEALTH CARE EDUCATION/TRAINING PROGRAM

## 2023-02-08 PROCEDURE — 3700000001 HC ADD 15 MINUTES (ANESTHESIA): Performed by: STUDENT IN AN ORGANIZED HEALTH CARE EDUCATION/TRAINING PROGRAM

## 2023-02-08 PROCEDURE — 2500000003 HC RX 250 WO HCPCS: Performed by: NURSE ANESTHETIST, CERTIFIED REGISTERED

## 2023-02-08 PROCEDURE — 3600000014 HC SURGERY LEVEL 4 ADDTL 15MIN: Performed by: STUDENT IN AN ORGANIZED HEALTH CARE EDUCATION/TRAINING PROGRAM

## 2023-02-08 PROCEDURE — 6360000002 HC RX W HCPCS: Performed by: NURSE ANESTHETIST, CERTIFIED REGISTERED

## 2023-02-08 PROCEDURE — 2720000010 HC SURG SUPPLY STERILE: Performed by: STUDENT IN AN ORGANIZED HEALTH CARE EDUCATION/TRAINING PROGRAM

## 2023-02-08 PROCEDURE — 3700000000 HC ANESTHESIA ATTENDED CARE: Performed by: STUDENT IN AN ORGANIZED HEALTH CARE EDUCATION/TRAINING PROGRAM

## 2023-02-08 PROCEDURE — 7100000001 HC PACU RECOVERY - ADDTL 15 MIN: Performed by: STUDENT IN AN ORGANIZED HEALTH CARE EDUCATION/TRAINING PROGRAM

## 2023-02-08 DEVICE — ANCHOR BONE SP FBRTAK RC TGRTPE BLU: Type: IMPLANTABLE DEVICE | Site: SHOULDER | Status: FUNCTIONAL

## 2023-02-08 DEVICE — ANCHOR SUTURE BIOCOMP 4.75X19.1 MM SWIVELOCK C: Type: IMPLANTABLE DEVICE | Site: SHOULDER | Status: FUNCTIONAL

## 2023-02-08 DEVICE — ANCHOR BONE SP FBRTAK RC TGRTPE WH/BLK: Type: IMPLANTABLE DEVICE | Site: SHOULDER | Status: FUNCTIONAL

## 2023-02-08 RX ORDER — MIDAZOLAM HYDROCHLORIDE 1 MG/ML
INJECTION INTRAMUSCULAR; INTRAVENOUS
Status: COMPLETED
Start: 2023-02-08 | End: 2023-02-08

## 2023-02-08 RX ORDER — ROCURONIUM BROMIDE 10 MG/ML
INJECTION, SOLUTION INTRAVENOUS PRN
Status: DISCONTINUED | OUTPATIENT
Start: 2023-02-08 | End: 2023-02-08 | Stop reason: SDUPTHER

## 2023-02-08 RX ORDER — DIPHENHYDRAMINE HYDROCHLORIDE 50 MG/ML
12.5 INJECTION INTRAMUSCULAR; INTRAVENOUS
Status: DISCONTINUED | OUTPATIENT
Start: 2023-02-08 | End: 2023-02-08 | Stop reason: HOSPADM

## 2023-02-08 RX ORDER — ONDANSETRON 2 MG/ML
4 INJECTION INTRAMUSCULAR; INTRAVENOUS
Status: DISCONTINUED | OUTPATIENT
Start: 2023-02-08 | End: 2023-02-08 | Stop reason: HOSPADM

## 2023-02-08 RX ORDER — SODIUM CHLORIDE, SODIUM LACTATE, POTASSIUM CHLORIDE, CALCIUM CHLORIDE 600; 310; 30; 20 MG/100ML; MG/100ML; MG/100ML; MG/100ML
INJECTION, SOLUTION INTRAVENOUS CONTINUOUS
Status: DISCONTINUED | OUTPATIENT
Start: 2023-02-08 | End: 2023-02-08 | Stop reason: HOSPADM

## 2023-02-08 RX ORDER — MEPERIDINE HYDROCHLORIDE 25 MG/ML
12.5 INJECTION INTRAMUSCULAR; INTRAVENOUS; SUBCUTANEOUS
Status: DISCONTINUED | OUTPATIENT
Start: 2023-02-08 | End: 2023-02-08 | Stop reason: HOSPADM

## 2023-02-08 RX ORDER — FENTANYL CITRATE 50 UG/ML
INJECTION, SOLUTION INTRAMUSCULAR; INTRAVENOUS PRN
Status: DISCONTINUED | OUTPATIENT
Start: 2023-02-08 | End: 2023-02-08 | Stop reason: SDUPTHER

## 2023-02-08 RX ORDER — OXYCODONE HYDROCHLORIDE 5 MG/1
5 TABLET ORAL
Status: DISCONTINUED | OUTPATIENT
Start: 2023-02-08 | End: 2023-02-08 | Stop reason: HOSPADM

## 2023-02-08 RX ORDER — SODIUM CHLORIDE 0.9 % (FLUSH) 0.9 %
5-40 SYRINGE (ML) INJECTION PRN
Status: DISCONTINUED | OUTPATIENT
Start: 2023-02-08 | End: 2023-02-08 | Stop reason: HOSPADM

## 2023-02-08 RX ORDER — EPHEDRINE SULFATE/0.9% NACL/PF 50 MG/5 ML
SYRINGE (ML) INTRAVENOUS PRN
Status: DISCONTINUED | OUTPATIENT
Start: 2023-02-08 | End: 2023-02-08 | Stop reason: SDUPTHER

## 2023-02-08 RX ORDER — ONDANSETRON 2 MG/ML
INJECTION INTRAMUSCULAR; INTRAVENOUS PRN
Status: DISCONTINUED | OUTPATIENT
Start: 2023-02-08 | End: 2023-02-08 | Stop reason: SDUPTHER

## 2023-02-08 RX ORDER — CEFAZOLIN SODIUM IN 0.9 % NACL 2 G/100 ML
2000 PLASTIC BAG, INJECTION (ML) INTRAVENOUS
Status: DISCONTINUED | OUTPATIENT
Start: 2023-02-08 | End: 2023-02-08 | Stop reason: HOSPADM

## 2023-02-08 RX ORDER — LIDOCAINE HYDROCHLORIDE 20 MG/ML
INJECTION, SOLUTION INTRAVENOUS PRN
Status: DISCONTINUED | OUTPATIENT
Start: 2023-02-08 | End: 2023-02-08 | Stop reason: SDUPTHER

## 2023-02-08 RX ORDER — SODIUM CHLORIDE 0.9 % (FLUSH) 0.9 %
5-40 SYRINGE (ML) INJECTION EVERY 12 HOURS SCHEDULED
Status: DISCONTINUED | OUTPATIENT
Start: 2023-02-08 | End: 2023-02-08 | Stop reason: HOSPADM

## 2023-02-08 RX ORDER — ROPIVACAINE HYDROCHLORIDE 5 MG/ML
INJECTION, SOLUTION EPIDURAL; INFILTRATION; PERINEURAL PRN
Status: DISCONTINUED | OUTPATIENT
Start: 2023-02-08 | End: 2023-02-08 | Stop reason: SDUPTHER

## 2023-02-08 RX ORDER — MIDAZOLAM HYDROCHLORIDE 2 MG/2ML
1 INJECTION, SOLUTION INTRAMUSCULAR; INTRAVENOUS ONCE
Status: COMPLETED | OUTPATIENT
Start: 2023-02-08 | End: 2023-02-08

## 2023-02-08 RX ORDER — FENTANYL CITRATE 0.05 MG/ML
50 INJECTION, SOLUTION INTRAMUSCULAR; INTRAVENOUS EVERY 10 MIN PRN
Status: DISCONTINUED | OUTPATIENT
Start: 2023-02-08 | End: 2023-02-08 | Stop reason: HOSPADM

## 2023-02-08 RX ORDER — MIDAZOLAM HYDROCHLORIDE 1 MG/ML
INJECTION INTRAMUSCULAR; INTRAVENOUS PRN
Status: DISCONTINUED | OUTPATIENT
Start: 2023-02-08 | End: 2023-02-08 | Stop reason: SDUPTHER

## 2023-02-08 RX ORDER — SODIUM CHLORIDE 9 MG/ML
INJECTION, SOLUTION INTRAVENOUS PRN
Status: DISCONTINUED | OUTPATIENT
Start: 2023-02-08 | End: 2023-02-08 | Stop reason: HOSPADM

## 2023-02-08 RX ORDER — SODIUM CHLORIDE 9 MG/ML
25 INJECTION, SOLUTION INTRAVENOUS PRN
Status: DISCONTINUED | OUTPATIENT
Start: 2023-02-08 | End: 2023-02-08 | Stop reason: HOSPADM

## 2023-02-08 RX ORDER — OXYCODONE HYDROCHLORIDE AND ACETAMINOPHEN 5; 325 MG/1; MG/1
1 TABLET ORAL EVERY 6 HOURS PRN
Qty: 20 TABLET | Refills: 0 | Status: SHIPPED | OUTPATIENT
Start: 2023-02-08 | End: 2023-02-10

## 2023-02-08 RX ORDER — PROPOFOL 10 MG/ML
INJECTION, EMULSION INTRAVENOUS PRN
Status: DISCONTINUED | OUTPATIENT
Start: 2023-02-08 | End: 2023-02-08 | Stop reason: SDUPTHER

## 2023-02-08 RX ORDER — GLYCOPYRROLATE 0.2 MG/ML
INJECTION INTRAMUSCULAR; INTRAVENOUS PRN
Status: DISCONTINUED | OUTPATIENT
Start: 2023-02-08 | End: 2023-02-08 | Stop reason: SDUPTHER

## 2023-02-08 RX ORDER — METOCLOPRAMIDE HYDROCHLORIDE 5 MG/ML
10 INJECTION INTRAMUSCULAR; INTRAVENOUS
Status: DISCONTINUED | OUTPATIENT
Start: 2023-02-08 | End: 2023-02-08 | Stop reason: HOSPADM

## 2023-02-08 RX ADMIN — MIDAZOLAM HYDROCHLORIDE 2 MG: 1 INJECTION, SOLUTION INTRAMUSCULAR; INTRAVENOUS at 09:10

## 2023-02-08 RX ADMIN — MIDAZOLAM HYDROCHLORIDE 1 MG: 2 INJECTION, SOLUTION INTRAMUSCULAR; INTRAVENOUS at 10:54

## 2023-02-08 RX ADMIN — LIDOCAINE HYDROCHLORIDE 80 MG: 20 INJECTION, SOLUTION INTRAVENOUS at 11:17

## 2023-02-08 RX ADMIN — FENTANYL CITRATE 50 MCG: 50 INJECTION, SOLUTION INTRAMUSCULAR; INTRAVENOUS at 12:07

## 2023-02-08 RX ADMIN — MIDAZOLAM 1 MG: 1 INJECTION INTRAMUSCULAR; INTRAVENOUS at 10:54

## 2023-02-08 RX ADMIN — FENTANYL CITRATE 50 MCG: 50 INJECTION, SOLUTION INTRAMUSCULAR; INTRAVENOUS at 13:29

## 2023-02-08 RX ADMIN — ROCURONIUM BROMIDE 50 MG: 10 INJECTION, SOLUTION INTRAVENOUS at 11:17

## 2023-02-08 RX ADMIN — SODIUM CHLORIDE, POTASSIUM CHLORIDE, SODIUM LACTATE AND CALCIUM CHLORIDE 1000 ML: 600; 310; 30; 20 INJECTION, SOLUTION INTRAVENOUS at 08:38

## 2023-02-08 RX ADMIN — ROCURONIUM BROMIDE 20 MG: 10 INJECTION, SOLUTION INTRAVENOUS at 12:38

## 2023-02-08 RX ADMIN — ROCURONIUM BROMIDE 10 MG: 10 INJECTION, SOLUTION INTRAVENOUS at 13:14

## 2023-02-08 RX ADMIN — Medication 2000 MG: at 11:11

## 2023-02-08 RX ADMIN — GLYCOPYRROLATE 0.2 MG: 0.2 INJECTION, SOLUTION INTRAMUSCULAR; INTRAVENOUS at 12:34

## 2023-02-08 RX ADMIN — ROPIVACAINE HYDROCHLORIDE 40 ML: 5 INJECTION, SOLUTION EPIDURAL; INFILTRATION; PERINEURAL at 09:10

## 2023-02-08 RX ADMIN — FENTANYL CITRATE 50 MCG: 50 INJECTION, SOLUTION INTRAMUSCULAR; INTRAVENOUS at 11:17

## 2023-02-08 RX ADMIN — FENTANYL CITRATE 50 MCG: 50 INJECTION, SOLUTION INTRAMUSCULAR; INTRAVENOUS at 13:56

## 2023-02-08 RX ADMIN — Medication 10 MG: at 12:36

## 2023-02-08 RX ADMIN — PROPOFOL 200 MG: 10 INJECTION, EMULSION INTRAVENOUS at 11:17

## 2023-02-08 RX ADMIN — SUGAMMADEX 200 MG: 100 INJECTION, SOLUTION INTRAVENOUS at 14:01

## 2023-02-08 RX ADMIN — SODIUM CHLORIDE, POTASSIUM CHLORIDE, SODIUM LACTATE AND CALCIUM CHLORIDE: 600; 310; 30; 20 INJECTION, SOLUTION INTRAVENOUS at 13:57

## 2023-02-08 RX ADMIN — ONDANSETRON 4 MG: 2 INJECTION INTRAMUSCULAR; INTRAVENOUS at 13:15

## 2023-02-08 ASSESSMENT — PAIN SCALES - GENERAL
PAINLEVEL_OUTOF10: 0
PAINLEVEL_OUTOF10: 2

## 2023-02-08 ASSESSMENT — PAIN - FUNCTIONAL ASSESSMENT: PAIN_FUNCTIONAL_ASSESSMENT: 0-10

## 2023-02-08 NOTE — ANESTHESIA POSTPROCEDURE EVALUATION
Department of Anesthesiology  Postprocedure Note    Patient: Lilly Oliveros  MRN: [id number]  YOB: 1970  Date of evaluation: 2/8/2023      Procedure Summary     Date: 02/08/23 Room / Location: [address]    Anesthesia Start: 0356 Anesthesia Stop: 6121    Procedure: Right Arthroscopic rotator cuff repair, subacromial decompression (Right: Shoulder) Diagnosis:       Complete tear of right rotator cuff      Subacromial impingement, right      (Complete tear of right rotator cuff [M75.121])      (Subacromial impingement, right [M75.41])    Surgeons: Poncho Russell MD Responsible Provider: Syed Hernandez MD    Anesthesia Type: General ASA Status: 3          Anesthesia Type: General    Seymour Phase I: Seymour Score: 10    Seymour Phase II:        Anesthesia Post Evaluation    Patient location during evaluation: bedside  Patient participation: complete - patient participated  Level of consciousness: awake and awake and alert  Pain score: 0  Airway patency: patent  Nausea & Vomiting: no nausea and no vomiting  Complications: no  Cardiovascular status: blood pressure returned to baseline and hemodynamically stable  Respiratory status: acceptable  Hydration status: euvolemic

## 2023-02-08 NOTE — INTERVAL H&P NOTE
Update History & Physical    The patient's History and Physical of February 1, 2023 was reviewed with the patient and I examined the patient. There was no change. The surgical site was confirmed by the patient and me. Plan: The risks, benefits, expected outcome, and alternative to the recommended procedure have been discussed with the patient. Patient understands and wants to proceed with the procedure.      Electronically signed by Kacey Alcala MD on 2/8/2023 at 10:41 AM

## 2023-02-08 NOTE — ANESTHESIA PRE PROCEDURE
Department of Anesthesiology  Preprocedure Note       Name:  Rehan Ried   Age:  46 y.o.  :  1970                                          MRN:  [id number]         Date:  2023      Surgeon: Mag Urena):  Ni Lin MD    Procedure: Procedure(s):  Right Arthroscopic rotator cuff repair, subacromial decompression,Beach chair, arthrex speed bride, tino ,General and Regional Block  PAT WITH VALDEZ HAGER    Medications prior to admission:   Prior to Admission medications    Medication Sig Start Date End Date Taking? Authorizing Provider   chlorhexidine (HIBICLENS) 4 % external liquid Apply topically daily for 3 days prior to surgery.  23   ZHANG Saucedo   clonazePAM Miles Asters) 1 MG tablet  23   Historical Provider, MD   meloxicam (MOBIC) 15 MG tablet Take 1 tablet by mouth daily as needed for Pain 23   ZHANG Christianson   amLODIPine (NORVASC) 5 MG tablet Take 1 tab by mouth daily 22   Jenny Monreal MD   omeprazole (PRILOSEC) 20 MG delayed release capsule Take one cap by mouth daily 22   Jenny Monreal MD       Current medications:    Current Facility-Administered Medications   Medication Dose Route Frequency Provider Last Rate Last Admin    ceFAZolin (ANCEF) 2000 mg in 0.9% sodium chloride 100 mL IVPB  2,000 mg IntraVENous 30 Min Pre-Op Ni Lin MD        lactated ringers IV soln infusion   IntraVENous Continuous Ni Lin MD        sodium chloride flush 0.9 % injection 5-40 mL  5-40 mL IntraVENous 2 times per day ZHANG Saucedo        sodium chloride flush 0.9 % injection 5-40 mL  5-40 mL IntraVENous PRN Ozella ClonZHANG Wen        0.9 % sodium chloride infusion   IntraVENous PRN Ozella ZHANG Renae           Allergies:  No Known Allergies    Problem List:    Patient Active Problem List   Diagnosis Code    Chronic back pain M54.9, G89.29    Hypertension I10    GERD (gastroesophageal reflux disease) K21.9    Obesity E66.9    Acute medial meniscus tear, left, initial encounter S83.242A    Arthritis of left knee M17.12    Complete tear of right rotator cuff M75.121    Subacromial impingement, right M75.41       Past Medical History:        Diagnosis Date    Chronic back pain     GERD (gastroesophageal reflux disease)     Heartburn     HTN (hypertension)        Past Surgical History:        Procedure Laterality Date    APPENDECTOMY      BACK SURGERY      COLONOSCOPY N/A 2021    COLORECTAL CANCER SCREENING, NOT HIGH RISK performed by Arian Higuera MD at [address] [facility] ARTHROSCOPY Left 2020    ARTHROSCOPY LEFT KNEE, PARTIAL MEDIAL MENISECTOMY performed by Hansa Armenta MD at [facility] CYST EXCISION  2005    TONSILLECTOMY      UPPER GASTROINTESTINAL ENDOSCOPY  2008    Hiatal hernia, normal stomach, normal duodenum    UPPER GASTROINTESTINAL ENDOSCOPY N/A 2023    EGD ESOPHAGOGASTRODUODENOSCOPY performed by Arian Higuera MD at [facility] History:    Social History     Tobacco Use    Smoking status: Former     Packs/day: 0.00     Years: 5.00     Pack years: 0.00     Types: Cigarettes     Quit date: 2007     Years since quittin.1    Smokeless tobacco: Never    Tobacco comments:     Vapor   Substance Use Topics    Alcohol use: Not Currently     Comment: occasionally                                Counseling given: Not Answered  Tobacco comments: Vapor      Vital Signs (Current): There were no vitals filed for this visit.                                            BP Readings from Last 3 Encounters:   23 126/72   23 126/84   23 133/86       NPO Status:                                                                                 BMI:   Wt Readings from Last 3 Encounters:   23 244 lb (110.7 kg)   23 244 lb (110.7 kg)   23 235 lb 8 oz (106.8 kg)     There is no height or weight on file to calculate BMI.    CBC: Lab Results   Component Value Date/Time    WBC 6.1 02/01/2023 12:24 PM    RBC 4.65 02/01/2023 12:24 PM    HGB 14.2 02/01/2023 12:24 PM    HCT 42.8 02/01/2023 12:24 PM    MCV 92.2 02/01/2023 12:24 PM    RDW 14.2 02/01/2023 12:24 PM     02/01/2023 12:24 PM       CMP:   Lab Results   Component Value Date/Time     02/01/2023 12:24 PM    K 4.9 02/01/2023 12:24 PM     02/01/2023 12:24 PM    CO2 27 02/01/2023 12:24 PM    BUN 13 02/01/2023 12:24 PM    CREATININE 0.67 02/01/2023 12:24 PM    GFRAA >60.0 09/27/2022 01:19 PM    LABGLOM >60.0 02/01/2023 12:24 PM    GLUCOSE 95 02/01/2023 12:24 PM    GLUCOSE 84 04/23/2012 04:23 PM    PROT 8.1 09/27/2022 01:19 PM    CALCIUM 8.9 02/01/2023 12:24 PM    BILITOT 1.6 09/27/2022 01:19 PM    ALKPHOS 61 09/27/2022 01:19 PM    AST 16 09/27/2022 01:19 PM    ALT 15 09/27/2022 01:19 PM       POC Tests: No results for input(s): POCGLU, POCNA, POCK, POCCL, POCBUN, POCHEMO, POCHCT in the last 72 hours.     Coags:   Lab Results   Component Value Date/Time    PROTIME 9.4 06/27/2014 11:16 AM    INR 0.9 06/27/2014 11:16 AM    APTT 26.7 06/27/2014 11:16 AM       HCG (If Applicable): No results found for: PREGTESTUR, PREGSERUM, HCG, HCGQUANT     ABGs: No results found for: PHART, PO2ART, ZZU8THS, XNT4KPD, BEART, T0MPCQJO     Type & Screen (If Applicable):  No results found for: LABABO, LABRH    Drug/Infectious Status (If Applicable):  Lab Results   Component Value Date/Time    HEPCAB NONREACTIVE 09/27/2022 01:19 PM       COVID-19 Screening (If Applicable):   Lab Results   Component Value Date/Time    COVID19 Not Detected 04/09/2021 06:25 PM           Anesthesia Evaluation  Patient summary reviewed and Nursing notes reviewed no history of anesthetic complications:   Airway: Mallampati: II  TM distance: >3 FB   Neck ROM: full  Mouth opening: > = 3 FB   Dental: normal exam         Pulmonary:Negative Pulmonary ROS and normal exam                               Cardiovascular:    (+) hypertension:,       ECG reviewed                        Neuro/Psych:   Negative Neuro/Psych ROS              GI/Hepatic/Renal:   (+) GERD:, morbid obesity          Endo/Other: Negative Endo/Other ROS                    Abdominal:   (+) obese,           Vascular: negative vascular ROS. Other Findings:           Anesthesia Plan      general     ASA 3       Induction: intravenous. MIPS: Prophylactic antiemetics administered. Anesthetic plan and risks discussed with patient. Plan discussed with CRNA.     Attending anesthesiologist reviewed and agrees with Preprocedure content      Post-op pain plan if not by surgeon: single peripheral nerve block            Maria Luz Perez MD   2/8/2023

## 2023-02-08 NOTE — BRIEF OP NOTE
Brief Postoperative Note      Patient: Graham Méndez  YOB: 1970  MRN: [id number]    Date of Procedure: 2/8/2023    Pre-Op Diagnosis: Complete tear of right rotator cuff,  Subacromial impingement    Post-Op Diagnosis: Same       Procedure(s):  Right shoulder arthroscopic rotator cuff repair, subacromial decompression    Surgeon(s):  Eunice Oreilly MD    Assistant:  First Assistant: [address]  Physician Assistant: Vanessa Cheung PA-C    Anesthesia: General    Estimated Blood Loss (mL): less than 50     Complications: None    Specimens:   * No specimens in log *    Implants:  Implant Name Type Inv.  Item Serial No.  Lot No. LRB No. Used Action   ANCHOR BONE SP FBRTAK RC TGRTPE BERYL  - [id number]  ANCHOR BONE SP FBRTAK RC TGRTPE BERYL  [id number] [employer] [id number] Right 1 Implanted   ANCHOR BONE SP FBRTAK RC TGRTPE WH/BLK  - [id number]  ANCHOR BONE SP FBRTAK RC TGRTPE WH/BLK  [id number] [employer] [id number] Right 1 Implanted   [facility] SUTURE BIOCOMP 4.75X19.1 MM Deneise Kleine [id number]  [facility] SUTURE BIOCOMP 4.75X19.1 MM [employer] [id number] Right 1 Implanted   [facility] SUTURE BIOCOMP 4.75X19.1 MM Deneise Kleine [id number]  [facility] SUTURE BIOCOMP 4.75X19.1 MM [employer] [id number] Right 1 Implanted         Drains: * No LDAs found *    Findings: Full thickness supraspinatus tear, intact subscapularis, well appearing  long head of the biceps tendon    Electronically signed by Eunice Oreilly MD on 2/8/2023 at 2:11 PM

## 2023-02-08 NOTE — ANESTHESIA PROCEDURE NOTES
Peripheral Block    Patient location during procedure: pre-op  Reason for block: post-op pain management  Start time: 2/8/2023 9:10 AM  Staffing  Performed: anesthesiologist   Anesthesiologist: Chaparro Ortiz MD  Preanesthetic Checklist  Completed: patient identified, IV checked, site marked, risks and benefits discussed, surgical/procedural consents, equipment checked, pre-op evaluation, timeout performed, anesthesia consent given, oxygen available, monitors applied/VS acknowledged, fire risk safety assessment completed and verbalized and blood product R/B/A discussed and consented  Peripheral Block   Patient position: supine  Prep: ChloraPrep  Provider prep: mask and sterile gloves  Patient monitoring: cardiac monitor, continuous pulse ox, frequent blood pressure checks and IV access  Block type: Brachial plexus  Supraclavicular  Laterality: right  Injection technique: single-shot  Guidance: ultrasound guided  Local infiltration: ropivacaine  Infiltration strength: 0.5 %  Local infiltration: ropivacaine  Dose: 40 mL    Needle   Needle type: insulated echogenic nerve stimulator needle   Needle gauge: 21 G  Needle localization: ultrasound guidance  Test dose: negative  Needle length: 10 cm  Assessment   Injection assessment: negative aspiration for heme, no paresthesia on injection, local visualized surrounding nerve on ultrasound and no intravascular symptoms  Paresthesia pain: none  Slow fractionated injection: yes  Hemodynamics: stable  Real-time US image taken/store: yes  Outcomes: uncomplicated

## 2023-02-08 NOTE — DISCHARGE INSTRUCTIONS
ROTATOR CUFF REPAIR POST-OP INSTRUCTIONS   DR. Antoni Waterman    YOUR PROCEDURE WAS: Right shoulder arthroscopic rotator cuff repair with subacromial decompression     Please follow these instructions carefully. If you have any questions, please contact a member of Dr. Samuel Pedro team at [phone]. FOLLOW-UP APPOINTMENTS:  We would like to see you for a post-operative visit at: 2 weeks, 6 weeks, and 12 weeks (3 months) after your procedure. If you have not made your post-operative appointments, please call Dr. Samuel Pedro office to schedule your appointment. [address] [place] [address]   [phone]    INCISIONS AND DRESSINGS:  Your shoulder was dressed in the sterile environment in the operating room. The incisions were closed with absorbable sutures that do NOT need removed. These were then covered with steri-strips (thin white strips of bandage), gauze, a large pad, and tape. You may remove the gauze, large pad and tape within 3 days of surgery - leave the steri-strips in place. These will fall off on their own in about 7 days. On a daily basis, evaluate the incision for drainage, redness surrounding the incision or red streaks. These combined with increasing pain and fever (Temp greater than 101 degrees) can be signs of infection - please notify our office right away                                                         Showering/Bathing: You may take a shower 72 hours after surgery, after removing the dressings as above, leaving the steri-strips in place. Clean, soapy water may run over the area, but do not attempt to scrub or wash the area vigorously. Pat the area dry after the shower and apply a new bandaid if desired. Soaking the incisions in a tub, pool or hot tub is NOT permitted until instructed by your physician, generally 3 weeks post-operatively. Avoid creams, salves or ointments unless instructed to do so by your physician.       PAIN MANAGEMENT AND OTHER MEDICATIONS  Your shoulder block should keep you comfortable 12-24 hours after surgery. Common things you may experience with blocks are: The inability to move your extremity  Numbness in your hand and shoulder  Hoarseness of your voice  Slight droop of your eyelid  These symptoms are normal and should wear off in the first few days after surgery. You may feel like you cannot take a full deep breath, which is normal but if you have significant labored breathing call the emergency physician on call or 911   When you start feeling the block wear off (tingling sensations into your arm and hand) MAKE SURE you have started your oral pain medications. If you wait until it is completely worn off you will fall behind the pain, instead of staying ahead of it. You may resume your regular medicines after surgery. We often prescribe narcotic pain medications to aid in controlling post-operative pain - such as Oxycodone or Norco. These medications may not alleviate ALL of your discomfort, but should help manage your pain along with elevation of your extremity and icing. Please take medications as follows: Take 1 tablet every 4-6 hours as needed for pain. Do not mix with alcohol or drive while you are taking narcotics. Pain medications will only be refilled in the post-operative period. The [address] [place] [address] Dr does not allow us to manage chronic pain, so no prescriptions will be filled past your 6 week post operative visit. It is crucial to keep this in mind as you wean off these medications. Unfortunately, some of these medications may not be covered by your insurance. If this is the case, we will not be able to obtain authorization for coverage. Narcotics and other pain medications will NOT be refilled on weekends or after hours. Please note: narcotics are highly addictive pain medications that can create side effects such as constipation and lethargy.  Many narcotics, such as Percocet, Norco and Vicodin, also contain acetaminophen (Tylenol), which if taken in increasing doses can cause liver failure and even death. All narcotic pain medications are highly addictive and must be used with caution because they cause tolerance whereby the body adapts to them and, in order to achieve pain relief, the body requires increasing doses. Constipation may occur when taking oral pain medications. Please increase your water intake while taking these medications. If you are experiencing discomfort due to constipation, you may take an over the counter stool softener (Colace, Miralax, Milk of Magnesia, etc). SHOULDER SLING:  You will wear a sling with a pillow at your side for 6 weeks after surgery. This is necessary to protect your shoulder while it heals properly. You may loosen your sling to move your hand, wrist, and elbow, as well as pendulums several times per day after surgery, but otherwise, you MUST keep your sling on at ALL times. Support your surgical arm, even while showering, such as with a commercially available basic mesh or canvas sling. PHYSICAL THERAPY OVERVIEW  You are NON-weight bearing in this surgical arm - this means you cannot lift anything over 2 pounds. You will start therapy after your first 2 week post operative visit. Until that time you will remain in the sling, only coming out for light wrist and elbow ROM as well as pendulums (see below). At home you may work on pendulum exercises to keep your shoulder from freezing up. You may start pendulum exercises 3-4 times a day as soon as the second day postop, and it is encouraged! DIET  You may eat a regular diet following your surgery. Please drink plenty of non-alcoholic, non-caffeinated beverages. Please do not consume alcohol with your pain medications. ICE MANAGEMENT  Ice your operative site 5-6 times a day 20 minutes at a time. An ice machine will be provided to you post operatively.  This will help decrease swelling and pain after your surgery. Use the ice machine as much as possible when you get home at intervals of 20 minutes. You should perform this consistently for a minimum of two weeks after surgery           Discharge Instructions for Peripheral Nerve Block Patients     Your nerve block is expected to last between about 16-32 hours after surgery. This is an estimation as to how long your nerve block will last. Your nerve block may wear off earlier or may last longer. Taking Your Pain Medication:     If needed, your surgeon will give you a prescription for pain medication. Start taking this medication before the nerve block first begins to wear off or when you first begin to feel discomfort. The idea is to have pain medication in your body before the nerve block wears off. It takes about 60 minutes for the oral pain medication to become fully effective. Keep in mind that nerve blocks often wear off in the middle of the night. If you are going to bed and the block has not started to wear off or you have not had any discomfort, consider setting an alarm to go off in 2-3 hours so you can assess your block. If you notice the block is wearing off or you are starting to have discomfort you can then take your medication. You need to take your pain medication as prescribed. Pain medications can cause sedation and decrease your breathing if you take more than you need for the level of pain you are having. This effect can be exponentially worse if you have sleep apnea. Nausea is a common side effect of many pain medications. You may want to eat something before taking your pain medicine to help prevent nausea. What to expect after a nerve block  Nerve blocks affect many types of nerves, including nerves that control movement, pain, and normal sensation. Nerve blocks cause feelings such as:  numbness  tingling  heaviness  weakness or inability to move your arm or leg  a feeling that your arm or leg has fallen asleep.   A nerve block can last for 2-36 hours or more depending on the medications used. Usually the weakness wears off first. The tingling and heaviness usually wear off next. Finally you may start to notice pain. Keep in mind that this may occur in any order. Once a nerve block starts to wear off it is usually completely gone within 60 minutes. Certain nerve blocks may cause other symptoms. If you have had a shoulder block or a block near your collar bone, you may have symptoms such as:  mild shortness of breath  a hoarse voice  blurry vision  unequal pupils  drooping of your face on the same side as the nerve block  Swelling at site of neck where block was placed  These are common side effects of this type of nerve block. These symptoms usually go away within 12-24 hours. If you have severe or prolonged shortness of breath, please go to the nearest Emergency Room  If you continue to feel the effects of the nerve block for longer than 48 hours, please call [facility] [phone] and ask to speak with the Anesthetist on call. Protection of a Numb Arm or Leg  After a nerve block, you cannot feel pain, pressure, or extremes in temperature in the effected limb. Because your arm or leg is numb it is at risk for injury. For example, it is possible to burn your numb arm or leg on a hot stove without knowing it. Here are some helpful tips to protect your arm or leg while it is numb: While you are awake change position of your arm or leg often. This helps to avoid putting too much pressure on the limb for long periods of time. While sleeping, pad the limb with pillows to avoid rolling onto it while you sleep. If you have had a shoulder or arm block, it is a good idea to sleep in a recliner with pillows under your arm to avoid rolling onto your numb arm as you sleep. If you have a cast or tight dressing, check the color of your fingers/toes every couple of hours. Call your surgeon if any look discolored.   If you have had a shoulder, arm, or hand block, you may go home with a sling. The sling will help to keep your arm in a safe position. Wear the sling at all times until the nerve block completely wears off. If you have had a leg block, you may have difficulty bearing weight on that leg. You may be sent home with crutches to use until the nerve block wears ff. Have someone assist you with walking until the nerve block completely wears off. Use caution in cold weather. Your numb leg or arm will not be able to feel extremes in temperature. Be sure to cover your limb appropriately before going outside in order to prevent frostbite.

## 2023-02-10 ENCOUNTER — TELEPHONE (OUTPATIENT)
Dept: ORTHOPEDIC SURGERY | Age: 53
End: 2023-02-10

## 2023-02-10 DIAGNOSIS — M75.121 COMPLETE TEAR OF RIGHT ROTATOR CUFF, UNSPECIFIED WHETHER TRAUMATIC: Primary | ICD-10-CM

## 2023-02-10 RX ORDER — OXYCODONE AND ACETAMINOPHEN 10; 325 MG/1; MG/1
1 TABLET ORAL EVERY 6 HOURS PRN
Qty: 12 TABLET | Refills: 0 | Status: SHIPPED | OUTPATIENT
Start: 2023-02-10 | End: 2023-02-13

## 2023-02-10 RX ORDER — IBUPROFEN 600 MG/1
600 TABLET ORAL EVERY 8 HOURS PRN
Qty: 30 TABLET | Refills: 0 | Status: SHIPPED | OUTPATIENT
Start: 2023-02-10 | End: 2023-02-20

## 2023-02-10 NOTE — TELEPHONE ENCOUNTER
FMLA Paperwork     Date Recived:02/09/2023  Date completed and awaiting provider signature:   Date signed and faxed: 02/10/2023  Date scanned into chart: 02/10/2023  Faxed to:  [phone]     Provider  [] Marika Poole  [] Nicci Steiner  [] Steve Matias  [] Shira Poe  [] Barrera Shaffer  [] Kamila [place]  [] Puneet Wick  [] Kylah Naidu  [] Mila hudson PA-C  [x] Marla Matamoros PA-C

## 2023-02-10 NOTE — TELEPHONE ENCOUNTER
I called the patient. After discussion with Dr. Estella Deluna we decided to treat the patient with Percocet 10 every 6 hours as well as adding ibuprofen. I contacted the patient.

## 2023-02-10 NOTE — PROGRESS NOTES
FMLA Paperwork     Date Recived:02/09/2023  Date completed and awaiting provider signature: Dr. Galindo Antunez  Date signed and faxed: 02/10/2023  Date scanned into chart: 02/10/2023  Faxed to:  [phone]     Provider  [] Zuly Frederick  [] Dandre Hernandez  [] Leonetta Gaucher  [] Rob Rose  [] Pablito Farnsworth  [] Amna Bolanos  [] Tammi Gonzalez  [] Anjum Carrillo  [] Car hudson PA-C  [x] Edgardo Keane PA-C

## 2023-02-10 NOTE — TELEPHONE ENCOUNTER
Patient called office asking if he can get a higher dose of the oxycodone. Patient says he takes it every 4 hours to help with the pain. He states the pain has been worse. He also would like to know is there anything else he can take.

## 2023-02-13 DIAGNOSIS — M75.121 COMPLETE TEAR OF RIGHT ROTATOR CUFF, UNSPECIFIED WHETHER TRAUMATIC: Primary | ICD-10-CM

## 2023-02-13 RX ORDER — OXYCODONE HYDROCHLORIDE 10 MG/1
10 TABLET ORAL EVERY 6 HOURS PRN
Qty: 12 TABLET | Refills: 0 | Status: SHIPPED | OUTPATIENT
Start: 2023-02-13 | End: 2023-02-16

## 2023-02-20 ENCOUNTER — TELEPHONE (OUTPATIENT)
Dept: ORTHOPEDIC SURGERY | Age: 53
End: 2023-02-20

## 2023-02-20 NOTE — TELEPHONE ENCOUNTER
Patient called office stating he's out of his pain medication. Patient would like to know if he can get a refill on the oxycodone. Patient says the ibuprofen makes him nauseous. Patient would like to know if there is anything else he can take instead of the ibuprofen.

## 2023-02-21 ENCOUNTER — OFFICE VISIT (OUTPATIENT)
Dept: ORTHOPEDIC SURGERY | Age: 53
End: 2023-02-21

## 2023-02-21 DIAGNOSIS — M75.121 COMPLETE TEAR OF RIGHT ROTATOR CUFF, UNSPECIFIED WHETHER TRAUMATIC: Primary | ICD-10-CM

## 2023-02-21 DIAGNOSIS — M75.41 IMPINGEMENT SYNDROME OF RIGHT SHOULDER: ICD-10-CM

## 2023-02-21 DIAGNOSIS — Z98.890 S/P ARTHROSCOPY OF RIGHT SHOULDER: ICD-10-CM

## 2023-02-21 PROCEDURE — 99024 POSTOP FOLLOW-UP VISIT: CPT | Performed by: STUDENT IN AN ORGANIZED HEALTH CARE EDUCATION/TRAINING PROGRAM

## 2023-02-21 RX ORDER — OXYCODONE HYDROCHLORIDE 5 MG/1
5 TABLET ORAL EVERY 8 HOURS PRN
Qty: 18 TABLET | Refills: 0 | Status: SHIPPED | OUTPATIENT
Start: 2023-02-21 | End: 2023-02-27

## 2023-02-21 ASSESSMENT — ENCOUNTER SYMPTOMS
GASTROINTESTINAL NEGATIVE: 1
ALLERGIC/IMMUNOLOGIC NEGATIVE: 1
EYES NEGATIVE: 1
RESPIRATORY NEGATIVE: 1

## 2023-02-21 NOTE — TELEPHONE ENCOUNTER
Patient seen in the office by Dr. Shiloh Robertson this morning. Pain medication issues were addressed.

## 2023-02-21 NOTE — PROGRESS NOTES
Orthopedic Surgery and Sports Medicine    Subjective:      Patient ID: Dm Henson is a 46 y.o. male who presents today for:  Chief Complaint   Patient presents with    Post-Op Check     Post op visit for Complete tear of right rotator cuff, 2 weeks out       HPI  Madelin Skiff is a 49-year-old male who presents today for postoperative evaluation. He is 2 weeks status post right shoulder arthroscopic rotator cuff repair and subacromial decompression. Date of surgery was 2/8/2023. He has struggled with pain control postoperatively. He was taking Roxicodone 10 mg for a short period of time. He states that he is not currently taking any narcotic pain medication. He states that he is currently taking ibuprofen and Zofran. He does report an incident approximately 1 week after surgery when a potted plant was falling and he went to go reach for the plant. He was wearing his sling at the time. However he reports an immense pain in his shoulder as well as an \"unzipping\" feeling. The pain has since somewhat improved. He has been compliant with wearing his sling. He denies any numbness or tingling in the right upper extremity. Denies any fever or chills. Past Medical History:   Diagnosis Date    Chronic back pain     GERD (gastroesophageal reflux disease)     Heartburn     HTN (hypertension)       Past Surgical History:   Procedure Laterality Date    APPENDECTOMY      BACK SURGERY  1997    L 4-5 microdisc.     COLONOSCOPY N/A 04/16/2021    COLORECTAL CANCER SCREENING, NOT HIGH RISK performed by Mami Hayes MD at [place] [address] ARTHROSCOPY Left 09/23/2020    ARTHROSCOPY LEFT KNEE, PARTIAL MEDIAL MENISECTOMY performed by Daphney Abreu MD at [address]  2012    C 3/4 ACDF    PILONIDAL CYST EXCISION  2005    SHOULDER ARTHROSCOPY Right 2/8/2023    Right Arthroscopic rotator cuff repair, subacromial decompression performed by Marvin Caballero MD at [facility] GASTROINTESTINAL ENDOSCOPY      Hiatal hernia, normal stomach, normal duodenum    UPPER GASTROINTESTINAL ENDOSCOPY N/A 2023    EGD ESOPHAGOGASTRODUODENOSCOPY performed by Mami Hayes MD at [facility] History    Marital status:      Spouse name: Not on file    Number of children: Not on file    Years of education: Not on file    Highest education level: Not on file   Occupational History    Not on file   Tobacco Use    Smoking status: Former     Packs/day: 0.00     Years: 5.00     Pack years: 0.00     Types: Cigarettes     Quit date: 2007     Years since quittin.1    Smokeless tobacco: Never    Tobacco comments:     Vapor   Vaping Use    Vaping Use: Former    Substances: Nicotine, Flavoring   Substance and Sexual Activity    Alcohol use: Not Currently     Comment: occasionally    Drug use: No    Sexual activity: Not on file   Other Topics Concern    Not on file   Social History Narrative    Not on file     Social Determinants of Health     Financial Resource Strain: Low Risk     Difficulty of Paying Living Expenses: Not hard at all   Food Insecurity: No Food Insecurity    Worried About [address] in the Last Year: Never true    [facility] in the Last Year: Never true   Transportation Needs: No Transportation Needs    Lack of Transportation (Medical): No    Lack of Transportation (Non-Medical):  No   Physical Activity: Not on file   Stress: Not on file   Social Connections: Not on file   Intimate Partner Violence: Not on file   Housing Stability: Unknown    Unable to Pay for Housing in the Last Year: Not on file    Number of Places Lived in the Last Year: Not on file    Unstable Housing in the Last Year: No     Family History   Problem Relation Age of Onset    High Blood Pressure Father     Diabetes Father     Crohn's Disease Brother     Coronary Art Dis Neg Hx     Colon Cancer Neg Hx      No Known Allergies  Current Outpatient Medications on File Prior to Visit   Medication Sig Dispense Refill    chlorhexidine (HIBICLENS) 4 % external liquid Apply topically daily for 3 days prior to surgery. 118 mL 0    clonazePAM (KLONOPIN) 1 MG tablet       meloxicam (MOBIC) 15 MG tablet Take 1 tablet by mouth daily as needed for Pain 30 tablet 0    amLODIPine (NORVASC) 5 MG tablet Take 1 tab by mouth daily 90 tablet 3    omeprazole (PRILOSEC) 20 MG delayed release capsule Take one cap by mouth daily 90 capsule 3    ibuprofen (ADVIL;MOTRIN) 600 MG tablet Take 1 tablet by mouth every 8 hours as needed for Pain 30 tablet 0     No current facility-administered medications on file prior to visit. Review of Systems   Constitutional: Negative. HENT: Negative. Eyes: Negative. Respiratory: Negative. Cardiovascular: Negative. Gastrointestinal: Negative. Endocrine: Negative. Genitourinary: Negative. Musculoskeletal:  Positive for arthralgias and myalgias. Skin: Negative. Allergic/Immunologic: Negative. Neurological: Negative. Hematological: Negative. Psychiatric/Behavioral: Negative. Objective: There were no vitals taken for this visit. Physical Exam:  Ortho Exam    Right shoulder: Incisions healing well without any erythema, warmth, or drainage. No signs of infection. There is mild bruising along the biceps region. He has full flexion and extension of the wrist and fingers. Range of motion of the shoulder deferred secondary to early postop period. Sensation intact to light touch throughout the median, radial, ulnar nerve distributions as well as the axillary nerve. 2+ radial pulse.       Radiographs and Laboratory Studies:     Diagnostic Imaging Studies:    None    Laboratory Studies:   Lab Results   Component Value Date    WBC 6.1 02/01/2023    HGB 14.2 02/01/2023    HCT 42.8 02/01/2023    MCV 92.2 02/01/2023     02/01/2023     No results found for: SEDRATE  No results found for: CRP    Assessment:      Diagnosis Orders   1. Complete tear of right rotator cuff, unspecified whether traumatic  Ambulatory referral to Physical Therapy    oxyCODONE (ROXICODONE) 5 MG immediate release tablet      2. Impingement syndrome of right shoulder  Ambulatory referral to Physical Therapy    oxyCODONE (ROXICODONE) 5 MG immediate release tablet      3. S/P arthroscopy of right shoulder  Ambulatory referral to Physical Therapy        Feliberto Dejesus is a 46 y.o. male who is 2 weeks status post right shoulder arthroscopic rotator cuff repair and subacromial decompression. Date of surgery was 2/8/2023. Plan:     Elvira Gonsalves is now 2 weeks postop. He is doing okay. He struggled with pain control for short period of time, but is now improving. I sent him a refill on his Roxicodone 5 mg. He did have an incident when he reached for a potted plant that was falling and felt pain and a sensation in his shoulder. We discussed strict use of the sling until 6 weeks postoperatively. He should not be using the shoulder actively for any reason. I gave him a printout of the postoperative rotator cuff repair rehabilitation protocol. He should bring this with him to his physical therapy sessions. Physical therapy will begin in another 2-3 weeks. We discussed gentle range of motion exercises of the hand, wrist, elbow in the meantime. However no motion at the shoulder. We reviewed the intraoperative arthroscopy photos. I would like to see him back in clinic in 4 weeks for repeat clinical evaluation.     Orders Placed This Encounter   Procedures    Ambulatory referral to Physical Therapy     Referral Priority:   Routine     Referral Type:   Eval and Treat     Referral Reason:   Specialty Services Required     Requested Specialty:   Physical Therapist     Number of Visits Requested:   1       Orders Placed This Encounter   Medications    oxyCODONE (ROXICODONE) 5 MG immediate release tablet     Sig: Take 1 tablet by mouth every 8 hours as needed for Pain for up to 6 days. Intended supply: 5 days. Take lowest dose possible to manage pain Max Daily Amount: 15 mg     Dispense:  18 tablet     Refill:  0     Reduce doses taken as pain becomes manageable       Return in about 4 weeks (around 3/21/2023).       Raúl Foss MD

## 2023-03-10 ENCOUNTER — HOSPITAL ENCOUNTER (EMERGENCY)
Age: 53
Discharge: HOME | End: 2023-03-10
Attending: EMERGENCY MEDICINE
Payer: COMMERCIAL

## 2023-03-10 VITALS
WEIGHT: 225 LBS | SYSTOLIC BLOOD PRESSURE: 148 MMHG | TEMPERATURE: 97.9 F | HEART RATE: 63 BPM | HEIGHT: 74 IN | DIASTOLIC BLOOD PRESSURE: 91 MMHG | BODY MASS INDEX: 28.88 KG/M2 | OXYGEN SATURATION: 100 % | RESPIRATION RATE: 15 BRPM

## 2023-03-10 DIAGNOSIS — K29.00 ACUTE SUPERFICIAL GASTRITIS WITHOUT HEMORRHAGE: ICD-10-CM

## 2023-03-10 DIAGNOSIS — F41.0 ANXIETY ATTACK: Primary | ICD-10-CM

## 2023-03-10 LAB
ALBUMIN SERPL-MCNC: 5 G/DL (ref 3.5–4.6)
ALP BLD-CCNC: 82 U/L (ref 35–104)
ALT SERPL-CCNC: 15 U/L (ref 0–41)
ANION GAP SERPL CALCULATED.3IONS-SCNC: 16 MEQ/L (ref 9–15)
AST SERPL-CCNC: 15 U/L (ref 0–40)
BASOPHILS ABSOLUTE: 0 K/UL (ref 0–0.2)
BASOPHILS RELATIVE PERCENT: 0.3 %
BILIRUB SERPL-MCNC: 1.3 MG/DL (ref 0.2–0.7)
BUN BLDV-MCNC: 16 MG/DL (ref 6–20)
CALCIUM SERPL-MCNC: 10 MG/DL (ref 8.5–9.9)
CHLORIDE BLD-SCNC: 99 MEQ/L (ref 95–107)
CO2: 23 MEQ/L (ref 20–31)
CREAT SERPL-MCNC: 0.79 MG/DL (ref 0.7–1.2)
EOSINOPHILS ABSOLUTE: 0 K/UL (ref 0–0.7)
EOSINOPHILS RELATIVE PERCENT: 0.2 %
GFR SERPL CREATININE-BSD FRML MDRD: >60 ML/MIN/{1.73_M2}
GLOBULIN: 3.3 G/DL (ref 2.3–3.5)
GLUCOSE BLD-MCNC: 159 MG/DL (ref 70–99)
HCT VFR BLD CALC: 47.1 % (ref 42–52)
HEMOGLOBIN: 16.2 G/DL (ref 14–18)
LIPASE: 32 U/L (ref 12–95)
LYMPHOCYTES ABSOLUTE: 1.1 K/UL (ref 1–4.8)
LYMPHOCYTES RELATIVE PERCENT: 9.2 %
MAGNESIUM: 1.7 MG/DL (ref 1.7–2.4)
MCH RBC QN AUTO: 30.3 PG (ref 27–31.3)
MCHC RBC AUTO-ENTMCNC: 34.3 % (ref 33–37)
MCV RBC AUTO: 88.5 FL (ref 79–92.2)
MONOCYTES ABSOLUTE: 0.4 K/UL (ref 0.2–0.8)
MONOCYTES RELATIVE PERCENT: 3.4 %
NEUTROPHILS ABSOLUTE: 10.2 K/UL (ref 1.4–6.5)
NEUTROPHILS RELATIVE PERCENT: 86.9 %
PDW BLD-RTO: 12.8 % (ref 11.5–14.5)
PLATELET # BLD: 308 K/UL (ref 130–400)
POTASSIUM SERPL-SCNC: 3.9 MEQ/L (ref 3.4–4.9)
RBC # BLD: 5.33 M/UL (ref 4.7–6.1)
SODIUM BLD-SCNC: 138 MEQ/L (ref 135–144)
TOTAL PROTEIN: 8.3 G/DL (ref 6.3–8)
WBC # BLD: 11.7 K/UL (ref 4.8–10.8)

## 2023-03-10 PROCEDURE — 99284 EMERGENCY DEPT VISIT MOD MDM: CPT

## 2023-03-10 PROCEDURE — 80053 COMPREHEN METABOLIC PANEL: CPT

## 2023-03-10 PROCEDURE — 96376 TX/PRO/DX INJ SAME DRUG ADON: CPT

## 2023-03-10 PROCEDURE — 83735 ASSAY OF MAGNESIUM: CPT

## 2023-03-10 PROCEDURE — 96372 THER/PROPH/DIAG INJ SC/IM: CPT

## 2023-03-10 PROCEDURE — 96375 TX/PRO/DX INJ NEW DRUG ADDON: CPT

## 2023-03-10 PROCEDURE — A4216 STERILE WATER/SALINE, 10 ML: HCPCS | Performed by: EMERGENCY MEDICINE

## 2023-03-10 PROCEDURE — 6360000002 HC RX W HCPCS: Performed by: EMERGENCY MEDICINE

## 2023-03-10 PROCEDURE — 83690 ASSAY OF LIPASE: CPT

## 2023-03-10 PROCEDURE — 2580000003 HC RX 258: Performed by: EMERGENCY MEDICINE

## 2023-03-10 PROCEDURE — 96374 THER/PROPH/DIAG INJ IV PUSH: CPT

## 2023-03-10 PROCEDURE — 85025 COMPLETE CBC W/AUTO DIFF WBC: CPT

## 2023-03-10 PROCEDURE — 6370000000 HC RX 637 (ALT 250 FOR IP): Performed by: EMERGENCY MEDICINE

## 2023-03-10 PROCEDURE — 2500000003 HC RX 250 WO HCPCS: Performed by: EMERGENCY MEDICINE

## 2023-03-10 RX ORDER — LORAZEPAM 2 MG/ML
2 INJECTION INTRAMUSCULAR ONCE
Status: COMPLETED | OUTPATIENT
Start: 2023-03-10 | End: 2023-03-10

## 2023-03-10 RX ORDER — SUCRALFATE 1 G/1
1 TABLET ORAL ONCE
Status: COMPLETED | OUTPATIENT
Start: 2023-03-10 | End: 2023-03-10

## 2023-03-10 RX ORDER — MAGNESIUM HYDROXIDE/ALUMINUM HYDROXICE/SIMETHICONE 120; 1200; 1200 MG/30ML; MG/30ML; MG/30ML
30 SUSPENSION ORAL ONCE
Status: COMPLETED | OUTPATIENT
Start: 2023-03-10 | End: 2023-03-10

## 2023-03-10 RX ORDER — LORAZEPAM 2 MG/ML
1 INJECTION INTRAMUSCULAR ONCE
Status: COMPLETED | OUTPATIENT
Start: 2023-03-10 | End: 2023-03-10

## 2023-03-10 RX ORDER — HALOPERIDOL 5 MG/ML
5 INJECTION INTRAMUSCULAR ONCE
Status: COMPLETED | OUTPATIENT
Start: 2023-03-10 | End: 2023-03-10

## 2023-03-10 RX ORDER — ONDANSETRON 2 MG/ML
4 INJECTION INTRAMUSCULAR; INTRAVENOUS ONCE
Status: COMPLETED | OUTPATIENT
Start: 2023-03-10 | End: 2023-03-10

## 2023-03-10 RX ORDER — SUCRALFATE 1 G/1
1 TABLET ORAL 4 TIMES DAILY
Qty: 30 TABLET | Refills: 3 | Status: SHIPPED | OUTPATIENT
Start: 2023-03-10

## 2023-03-10 RX ADMIN — FAMOTIDINE 20 MG: 10 INJECTION, SOLUTION INTRAVENOUS at 15:46

## 2023-03-10 RX ADMIN — SUCRALFATE 1 G: 1 TABLET ORAL at 15:46

## 2023-03-10 RX ADMIN — ONDANSETRON 4 MG: 2 INJECTION INTRAMUSCULAR; INTRAVENOUS at 11:39

## 2023-03-10 RX ADMIN — ALUMINUM HYDROXIDE, MAGNESIUM HYDROXIDE, AND SIMETHICONE 30 ML: 200; 200; 20 SUSPENSION ORAL at 14:59

## 2023-03-10 RX ADMIN — LORAZEPAM 2 MG: 2 INJECTION INTRAMUSCULAR; INTRAVENOUS at 14:24

## 2023-03-10 RX ADMIN — HALOPERIDOL LACTATE 5 MG: 5 INJECTION, SOLUTION INTRAMUSCULAR at 11:59

## 2023-03-10 RX ADMIN — LORAZEPAM 1 MG: 2 INJECTION INTRAMUSCULAR; INTRAVENOUS at 11:28

## 2023-03-10 ASSESSMENT — LIFESTYLE VARIABLES
HOW OFTEN DO YOU HAVE A DRINK CONTAINING ALCOHOL: NEVER
HOW MANY STANDARD DRINKS CONTAINING ALCOHOL DO YOU HAVE ON A TYPICAL DAY: PATIENT DOES NOT DRINK

## 2023-03-10 ASSESSMENT — PAIN - FUNCTIONAL ASSESSMENT: PAIN_FUNCTIONAL_ASSESSMENT: NONE - DENIES PAIN

## 2023-03-10 ASSESSMENT — PAIN DESCRIPTION - LOCATION: LOCATION: ABDOMEN

## 2023-03-10 ASSESSMENT — PAIN SCALES - GENERAL: PAINLEVEL_OUTOF10: 7

## 2023-03-10 ASSESSMENT — ENCOUNTER SYMPTOMS
EYE PAIN: 0
VOMITING: 0
NAUSEA: 0
CHEST TIGHTNESS: 0
SORE THROAT: 0
SHORTNESS OF BREATH: 0
ABDOMINAL PAIN: 0

## 2023-03-10 NOTE — ED TRIAGE NOTES
Pt arrives by ems with c/o numbness in his TANNER arms and legs     Pt states he was vomiting and nauseated this AM     Pt denies pain     Pt states he feels like he is going to faint     Pt had recent shoulder surgery and was taking oxycodone at home for pain management, pt stated he took the last one yesterday     Denies alcohol or drug use     Pt lethargic but responds to speech

## 2023-03-10 NOTE — ED PROVIDER NOTES
[facility] ED  EMERGENCY DEPARTMENT ENCOUNTER      Pt Name: Axel Villagran  MRN: [id number]  [place] 1970  Date of evaluation: 3/10/2023  Provider: Timmy Ramirez DO    CHIEF COMPLAINT       Chief Complaint   Patient presents with    Numbness     TANNER ARMS and LEGS starting this morning          HISTORY OF PRESENT ILLNESS   (Location/Symptom, Timing/Onset, Context/Setting, Quality, Duration, Modifying Factors, Severity)  Note limiting factors. Axel Villagran is a 46 y.o. male who presents to the emergency department . Patient comes in stating that he cannot feel any of his body. His whole body is numb. History of anxiety. On Klonopin. States he did take it this morning. Recent shoulder surgery last month. Nauseated. No chest pain. HPI    Nursing Notes were reviewed. REVIEW OF SYSTEMS    (2-9 systems for level 4, 10 or more for level 5)     Review of Systems   Constitutional:  Negative for activity change, appetite change and fatigue. HENT:  Negative for congestion and sore throat. Eyes:  Negative for pain and visual disturbance. Respiratory:  Negative for chest tightness and shortness of breath. Cardiovascular:  Negative for chest pain. Gastrointestinal:  Negative for abdominal pain, nausea and vomiting. Endocrine: Negative for polydipsia. Genitourinary:  Negative for flank pain and urgency. Musculoskeletal:  Negative for gait problem and neck stiffness. Skin:  Negative for rash. Neurological:  Positive for numbness. Negative for weakness, light-headedness and headaches. Psychiatric/Behavioral:  Negative for confusion and sleep disturbance. Except as noted above the remainder of the review of systems was reviewed and negative.        PAST MEDICAL HISTORY     Past Medical History:   Diagnosis Date    Chronic back pain     GERD (gastroesophageal reflux disease)     Heartburn     HTN (hypertension)          SURGICAL HISTORY       Past Surgical History:   Procedure Laterality Date    APPENDECTOMY      BACK SURGERY  1997    L 4-5 microdisc. COLONOSCOPY N/A 04/16/2021    COLORECTAL CANCER SCREENING, NOT HIGH RISK performed by Beverley Delatorre MD at [place] [address] ARTHROSCOPY Left 09/23/2020    ARTHROSCOPY LEFT KNEE, PARTIAL MEDIAL MENISECTOMY performed by Zabrina Culver MD at [address] [place] [address]  2012    C 3/4 ACDF    PILONIDAL CYST EXCISION  2005    SHOULDER ARTHROSCOPY Right 2/8/2023    Right Arthroscopic rotator cuff repair, subacromial decompression performed by Kelsey Orellana MD at [address] [place] [address]  2008    Hiatal hernia, normal stomach, normal duodenum    UPPER GASTROINTESTINAL ENDOSCOPY N/A 01/06/2023    EGD ESOPHAGOGASTRODUODENOSCOPY performed by Beverley Delatorre MD at [address] [place] [address]       Previous Medications    AMLODIPINE (NORVASC) 5 MG TABLET    Take 1 tab by mouth daily    CHLORHEXIDINE (HIBICLENS) 4 % EXTERNAL LIQUID    Apply topically daily for 3 days prior to surgery. CLONAZEPAM (KLONOPIN) 1 MG TABLET        IBUPROFEN (ADVIL;MOTRIN) 600 MG TABLET    Take 1 tablet by mouth every 8 hours as needed for Pain    MELOXICAM (MOBIC) 15 MG TABLET    Take 1 tablet by mouth daily as needed for Pain    OMEPRAZOLE (PRILOSEC) 20 MG DELAYED RELEASE CAPSULE    Take one cap by mouth daily       ALLERGIES     Patient has no known allergies.     FAMILY HISTORY       Family History   Problem Relation Age of Onset    High Blood Pressure Father     Diabetes Father     Crohn's Disease Brother     Coronary Art Dis Neg Hx     Colon Cancer Neg Hx           SOCIAL HISTORY       Social History     Socioeconomic History    Marital status:      Spouse name: None    Number of children: None    Years of education: None    Highest education level: None   Tobacco Use    Smoking status: Former     Packs/day: 0.00     Years: 5.00     Pack years: 0.00     Types: Cigarettes Quit date: 2007     Years since quittin.1    Smokeless tobacco: Never    Tobacco comments:     Vapor   Vaping Use    Vaping Use: Former    Substances: Nicotine, Flavoring   Substance and Sexual Activity    Alcohol use: Not Currently     Comment: occasionally    Drug use: No     Social Determinants of Health     Financial Resource Strain: Low Risk     Difficulty of Paying Living Expenses: Not hard at all   Food Insecurity: No Food Insecurity    Worried About [address] [employer] in the Last Year: Never true    [address] in the Last Year: Never true   Transportation Needs: No Transportation Needs    Lack of Transportation (Medical): No    Lack of Transportation (Non-Medical): No   Housing Stability: Unknown    Unstable Housing in the Last Year: No       SCREENINGS        [place] Coma Scale  Eye Opening: To speech  Best Verbal Response: Oriented  Best Motor Response: Obeys commands  [place] Coma Scale Score: 14               PHYSICAL EXAM    (up to 7 for level 4, 8 or more for level 5)     ED Triage Vitals [03/10/23 1113]   BP Temp Temp Source Heart Rate Resp SpO2 Height Weight   (!) 107/93 97.9 °F (36.6 °C) Oral 50 16 100 % 6' 2\" (1.88 m) 225 lb (102.1 kg)       Physical Exam  Vitals and nursing note reviewed. Constitutional:       General: He is not in acute distress. Appearance: He is well-developed. He is not diaphoretic. HENT:      Head: Normocephalic and atraumatic. Right Ear: External ear normal.      Left Ear: External ear normal.      Nose: Nose normal.      Mouth/Throat:      Mouth: Mucous membranes are moist.      Pharynx: No oropharyngeal exudate. Eyes:      Extraocular Movements: Extraocular movements intact. Conjunctiva/sclera: Conjunctivae normal.      Pupils: Pupils are equal, round, and reactive to light. Neck:      Thyroid: No thyromegaly. Vascular: No JVD. Trachea: No tracheal deviation. Cardiovascular:      Rate and Rhythm: Normal rate.       Heart sounds: Normal heart sounds. No murmur heard. Pulmonary:      Effort: Pulmonary effort is normal. No respiratory distress. Breath sounds: Normal breath sounds. No wheezing. Abdominal:      General: Bowel sounds are normal.      Palpations: Abdomen is soft. Tenderness: There is no abdominal tenderness. There is no guarding. Musculoskeletal:         General: Normal range of motion. Cervical back: Normal range of motion and neck supple. Right lower leg: No edema. Left lower leg: No edema. Skin:     General: Skin is warm and dry. Findings: No rash. Neurological:      Mental Status: He is alert and oriented to person, place, and time. Cranial Nerves: No cranial nerve deficit. Psychiatric:      Comments: Panic attack       DIAGNOSTIC RESULTS     EKG: All EKG's are interpreted by the Emergency Department Physician who either signs or Co-signs this chart in the absence of a cardiologist.    ***    RADIOLOGY:   Non-plain film images such as CT, Ultrasound and MRI are read by the radiologist. Plain radiographic images are visualized and preliminarily interpreted by the emergency physician with the below findings:    ***    Interpretation per the Radiologist below, if available at the time of this note:    No orders to display         ED BEDSIDE ULTRASOUND:   Performed by ED Physician - none    LABS:  Labs Reviewed   MAGNESIUM   COMPREHENSIVE METABOLIC PANEL   CBC WITH AUTO DIFFERENTIAL       All other labs were within normal range or not returned as of this dictation. EMERGENCY DEPARTMENT COURSE and DIFFERENTIAL DIAGNOSIS/MDM:   Vitals:    Vitals:    03/10/23 1113 03/10/23 1130   BP: (!) 107/93 (!) 148/91   Pulse: 50 50   Resp: 16 18   Temp: 97.9 °F (36.6 °C)    TempSrc: Oral    SpO2: 100% 100%   Weight: 225 lb (102.1 kg)    Height: 6' 2\" (1.88 m)        ***    Medical Decision Making  Amount and/or Complexity of Data Reviewed  Labs: ordered.     Risk  Prescription drug management. REASSESSMENT          CRITICAL CARE TIME   Total Critical Care time was *** minutes, excluding separately reportable procedures. There was a high probability of clinically significant/life threatening deterioration in the patient's condition which required my urgent intervention. ***    CONSULTS:  None    PROCEDURES:  Unless otherwise noted below, none     Procedures    No LOS Charge filed ***    FINAL IMPRESSION    No diagnosis found. DISPOSITION/PLAN   DISPOSITION        PATIENT REFERRED TO:  No follow-up provider specified. DISCHARGE MEDICATIONS:  New Prescriptions    No medications on file     Controlled Substances Monitoring:     RX Monitoring 2/22/2016   Attestation The Prescription Monitoring Report for this patient was reviewed today. Periodic Controlled Substance Monitoring No signs of potential drug abuse or diversion identified.        (Please note that portions of this note were completed with a voice recognition program.  Efforts were made to edit the dictations but occasionally words are mis-transcribed.)    Dillon Tate DO (electronically signed)  Attending Emergency Physician potential drug abuse or diversion identified.        (Please note that portions of this note were completed with a voice recognition program.  Efforts were made to edit the dictations but occasionally words are mis-transcribed.)    Vee Jackson DO (electronically signed)  Attending Emergency Physician            Vee Jackson DO  03/13/23 [id number]

## 2023-03-21 ENCOUNTER — OFFICE VISIT (OUTPATIENT)
Dept: ORTHOPEDIC SURGERY | Age: 53
End: 2023-03-21

## 2023-03-21 ENCOUNTER — TELEPHONE (OUTPATIENT)
Dept: ORTHOPEDIC SURGERY | Age: 53
End: 2023-03-21

## 2023-03-21 VITALS
HEIGHT: 74 IN | SYSTOLIC BLOOD PRESSURE: 112 MMHG | WEIGHT: 222 LBS | DIASTOLIC BLOOD PRESSURE: 72 MMHG | HEART RATE: 76 BPM | BODY MASS INDEX: 28.49 KG/M2

## 2023-03-21 DIAGNOSIS — Z98.890 S/P ARTHROSCOPY OF RIGHT SHOULDER: Primary | ICD-10-CM

## 2023-03-21 DIAGNOSIS — M75.121 COMPLETE TEAR OF RIGHT ROTATOR CUFF, UNSPECIFIED WHETHER TRAUMATIC: ICD-10-CM

## 2023-03-21 DIAGNOSIS — M75.41 IMPINGEMENT SYNDROME OF RIGHT SHOULDER: ICD-10-CM

## 2023-03-21 PROCEDURE — 99024 POSTOP FOLLOW-UP VISIT: CPT | Performed by: STUDENT IN AN ORGANIZED HEALTH CARE EDUCATION/TRAINING PROGRAM

## 2023-03-21 RX ORDER — ESCITALOPRAM OXALATE 20 MG/1
20 TABLET ORAL DAILY
COMMUNITY
Start: 2023-03-05

## 2023-03-21 ASSESSMENT — ENCOUNTER SYMPTOMS
EYES NEGATIVE: 1
GASTROINTESTINAL NEGATIVE: 1
ALLERGIC/IMMUNOLOGIC NEGATIVE: 1
RESPIRATORY NEGATIVE: 1

## 2023-03-21 NOTE — PROGRESS NOTES
Orthopedic Surgery and Sports Medicine    Subjective:      Patient ID: Ammon Dodson is a 46 y.o. male who presents today for:  Chief Complaint   Patient presents with    Post-Op Check     Patient is following up right arthroscopic rotator cuff repair, subacromial decompression       HPI  Marychuy Myers is a 69-year-old male who presents today for postoperative evaluation. He is 6 weeks status post right shoulder arthroscopic rotator cuff repair and subacromial decompression. Date of surgery was 2/8/2023. Marychuy Myers is doing overall well today. He reports mild throbbing in the right shoulder. He has been wearing his sling. He has not started physical therapy yet. He denies any recent fever or chills. He denies any wound complications or drainage. 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 He states that he can return to work as soon as he is able to shoot a gun in all directions. Past Medical History:   Diagnosis Date    Chronic back pain     GERD (gastroesophageal reflux disease)     Heartburn     HTN (hypertension)       Past Surgical History:   Procedure Laterality Date    APPENDECTOMY      BACK SURGERY  1997    L 4-5 microdisc.     COLONOSCOPY N/A 04/16/2021    COLORECTAL CANCER SCREENING, NOT HIGH RISK performed by Mary Jo Wall MD at [place] [address] ARTHROSCOPY Left 09/23/2020    ARTHROSCOPY LEFT KNEE, PARTIAL MEDIAL MENISECTOMY performed by Александр Wilkes MD at [address] [place] [address]  2012    C 3/4 ACDF    PILONIDAL CYST EXCISION  2005    SHOULDER ARTHROSCOPY Right 2/8/2023    Right Arthroscopic rotator cuff repair, subacromial decompression performed by Camille Conroy MD at [address] [place] [address]  2008    Hiatal hernia, normal stomach, normal duodenum    UPPER GASTROINTESTINAL ENDOSCOPY N/A 01/06/2023    EGD ESOPHAGOGASTRODUODENOSCOPY performed by Mary Jo Wall MD at [facility]

## 2023-03-21 NOTE — TELEPHONE ENCOUNTER
FMLA Paperwork     Date Recived: 03/21/2023  Date completed and awaiting provider signature: 03/21/2023  Date signed and faxed: 03/21/2023  Date scanned into chart: 03/21/2023  Faxed to:  [phone]     Provider  [] Joey Tobin  [] Aniceto Romeo  [] Jose Lopez  [] Carissa Burgos  [] Isa Dacosta  [] Shruthi Donnelly  [] Gregory Morton  [] Delia Castaneda  [x] Susan Thurston  [] Jero Ackerman   [] Sunita hudson PA-C  [] Lizbet Jules PA-C

## 2023-03-22 ENCOUNTER — HOSPITAL ENCOUNTER (OUTPATIENT)
Dept: PHYSICAL THERAPY | Age: 53
Setting detail: THERAPIES SERIES
Discharge: HOME | End: 2023-03-22
Payer: COMMERCIAL

## 2023-03-22 DIAGNOSIS — R11.0 NAUSEA: ICD-10-CM

## 2023-03-22 PROCEDURE — 97162 PT EVAL MOD COMPLEX 30 MIN: CPT

## 2023-03-22 PROCEDURE — 97110 THERAPEUTIC EXERCISES: CPT

## 2023-03-22 ASSESSMENT — PAIN DESCRIPTION - PAIN TYPE: TYPE: SURGICAL PAIN

## 2023-03-22 ASSESSMENT — PAIN SCALES - GENERAL: PAINLEVEL_OUTOF10: 0

## 2023-03-22 ASSESSMENT — PAIN DESCRIPTION - LOCATION: LOCATION: SHOULDER

## 2023-03-22 ASSESSMENT — PAIN DESCRIPTION - ORIENTATION: ORIENTATION: RIGHT

## 2023-03-22 NOTE — PROGRESS NOTES
UPPER GASTROINTESTINAL ENDOSCOPY N/A 01/06/2023    EGD ESOPHAGOGASTRODUODENOSCOPY performed by Bam Arnett MD at [facility]       Medications:   Current Outpatient Medications:     escitalopram (LEXAPRO) 20 MG tablet, Take 20 mg by mouth daily, Disp: , Rfl:     sucralfate (CARAFATE) 1 GM tablet, Take 1 tablet by mouth 4 times daily, Disp: 30 tablet, Rfl: 3    ibuprofen (ADVIL;MOTRIN) 600 MG tablet, Take 1 tablet by mouth every 8 hours as needed for Pain, Disp: 30 tablet, Rfl: 0    chlorhexidine (HIBICLENS) 4 % external liquid, Apply topically daily for 3 days prior to surgery., Disp: 118 mL, Rfl: 0    clonazePAM (KLONOPIN) 1 MG tablet, , Disp: , Rfl:     meloxicam (MOBIC) 15 MG tablet, Take 1 tablet by mouth daily as needed for Pain, Disp: 30 tablet, Rfl: 0    amLODIPine (NORVASC) 5 MG tablet, Take 1 tab by mouth daily, Disp: 90 tablet, Rfl: 3    omeprazole (PRILOSEC) 20 MG delayed release capsule, Take one cap by mouth daily, Disp: 90 capsule, Rfl: 3  Allergies: Patient has no known allergies.      Imaging (if applicable): No results found.  SUBJECTIVE EXAMINATION     History obtained from:: Patient, Chart Review,      Family/Caregiver Present: No    Subjective History: Onset Date: 02/08/23  Subjective: Pt s/p R RC Repair by Dr. Thurston on 2/8/23. Pt 6 weeks post-op today. Pt reports he has been dealing with post-op pain that makes sleeping difficult. 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 Pt drives sports cars as a hobby.   Additional Pertinent Hx (if applicable): cervical fusion, lumbar fusion   Previous treatments prior to current episode?: Surgery      Learning/Language: Learning  Does the patient/guardian have any barriers to learning?: No barriers  Will there be a co-learner?: No  What is the preferred language of the patient/guardian?: English  Is an  required?: No  How

## 2023-03-22 NOTE — PROGRESS NOTES
work activity  Assessment: Pt is a 45 yo male 6 weeks post-op right RC repair on 2/8/23. Pt presents with ongoing impaired functional use of his dominant RUE secondary to pain and post-op restriction. PROM assessed today with pt able to relax well achieving pain free 90 flex, 90 abd, and 30 ER; further limits not tested given acuity of surgery and required healing. Pt educated on post surgical precautions. Baseline HEP initiated per protocol with exercises reviewed in clinic for accuracy and intensity education. Plan to continue therapy care, advancing per surgical protocol. Pt has high functional demands he is hoping to achieve for both work and recreational hobbies. Therapy Prognosis: Good      PT Education: PT Role;Plan of Care;Goals; Evaluative findings; Anatomy of condition;Precautions (Post-Op Protocol from surgeon)    PLAN: [x] Evaluate and Treat  Frequency/Duration:  Plan Frequency: 1-2  Plan weeks: 10  Current Treatment Recommendations: Modalities, Dry needling, Home exercise program, Patient/Caregiver education & training, ROM, Strengthening, Neuromuscular re-education, Manual, Pain management, Return to work related activity  Modalities: Heat/Cold, Ultrasound, E-stim - unattended     Precautions: Other position/activity restrictions: Advance per surgical protocol; R RC Repair                  Patient Status:[x] Continue/ Initiate plan of Care     [] Discharge PT. Recommend pt continue with HEP. [] Additional visits requested, Please re-certify for additional visits:     [] Hold        Signature: Electronically signed by Yamila Singh PT on 3/22/23 at 3:00 PM EDT      If you have any questions or concerns, please don't hesitate to call. Thank you for your referral.    I have reviewed this plan of care and certify a need for medically necessary rehabilitation services.     Physician Signature:__________________________________________________________  Date:  Please sign and return

## 2023-03-23 RX ORDER — PROMETHAZINE HYDROCHLORIDE 25 MG/1
TABLET ORAL
Qty: 20 TABLET | Refills: 0 | Status: SHIPPED | OUTPATIENT
Start: 2023-03-23

## 2023-03-23 NOTE — TELEPHONE ENCOUNTER
Comments:     Last Office Visit (last PCP visit):   1/24/2023    Next Visit Date:  Future Appointments   Date Time Provider Thelma Sheldon   3/29/2023  1:45 PM Vertell Devoid, [address]   4/5/2023  1:45 PM Vertell Devoid, [address]   4/12/2023  1:45 PM Vertell Devoid, [address]   4/19/2023  1:45 PM Vertell Devoid, [address]   5/2/2023  9:30 AM Eunice Oreilly MD [facility] OR [facility] AT [place]       **If hasn't been seen in over a year OR hasn't followed up according to last diabetes/ADHD visit, make appointment for patient before sending refill to provider.     Rx requested:  Requested Prescriptions     Pending Prescriptions Disp Refills    promethazine (PHENERGAN) 25 MG tablet [Pharmacy Med Name: Promethazine HCl Oral Tablet 25 MG] 20 tablet 0     Sig: TAKE 1 TABLET BY MOUTH FOUR TIMES A DAY AS NEEDED FOR NAUSEA

## 2023-03-29 ENCOUNTER — HOSPITAL ENCOUNTER (OUTPATIENT)
Dept: PHYSICAL THERAPY | Age: 53
Setting detail: THERAPIES SERIES
Discharge: HOME | End: 2023-03-29
Payer: COMMERCIAL

## 2023-03-29 PROCEDURE — 97110 THERAPEUTIC EXERCISES: CPT

## 2023-03-29 ASSESSMENT — PAIN DESCRIPTION - DESCRIPTORS: DESCRIPTORS: SORE

## 2023-03-29 ASSESSMENT — PAIN DESCRIPTION - LOCATION: LOCATION: SHOULDER

## 2023-03-29 ASSESSMENT — PAIN DESCRIPTION - ORIENTATION: ORIENTATION: RIGHT

## 2023-03-29 ASSESSMENT — PAIN SCALES - GENERAL: PAINLEVEL_OUTOF10: 3

## 2023-03-29 NOTE — PROGRESS NOTES
See objective section for any therapeutic exercise changes, additions or modifications this date.     Therapy Time:      PT Individual Minutes  Time In: 3597  Time Out: 1420  Minutes: 33  Timed Code Treatment Minutes: 30 Minutes  Procedure Minutes: 0  Timed Activity Minutes Units   Ther Ex 30 2     Electronically signed by Mary Carmen Gipson on 3/29/23 at 2:31 PM EDT

## 2023-04-05 ENCOUNTER — HOSPITAL ENCOUNTER (OUTPATIENT)
Dept: PHYSICAL THERAPY | Age: 53
Setting detail: THERAPIES SERIES
Discharge: HOME | End: 2023-04-05
Payer: COMMERCIAL

## 2023-04-05 NOTE — PROGRESS NOTES
Therapy                            Cancellation/No-show Note      Date: 2023  Patient: Arcadio Baker (72 y.o. male)  : 1970  MRN:  [id number]  Referring Physician: Haresh Baker MD    Medical Diagnosis: Complete tear of right rotator cuff, unspecified whether traumatic [M75.121]  Impingement syndrome of right shoulder [M75.41]  S/P arthroscopy of right shoulder [Z98.890]      Visit Information:  Visits to Date 2   No Show/Cancelled Appts: 0       For today's appointment patient:  [x]  Cancelled  [x]  Rescheduled appointment  []  No-show   []  Called pt to remind of next appointment     Reason given by patient:  []  Patient ill  [x]  Conflicting appointment  []  No transportation    []  Conflict with work  []  No reason given  []  Other:      [x] Pt has future appointments scheduled, no follow up needed  [] Pt requests to be on hold.     Reason:   If > 2 weeks please discuss with therapist.  [] Therapist to call pt for follow up       Signature: Electronically signed by Julia Amador PT on 23 at 2:02 PM EDT

## 2023-04-06 ENCOUNTER — HOSPITAL ENCOUNTER (OUTPATIENT)
Dept: PHYSICAL THERAPY | Age: 53
Setting detail: THERAPIES SERIES
Discharge: HOME | End: 2023-04-06
Payer: COMMERCIAL

## 2023-04-06 PROCEDURE — 97110 THERAPEUTIC EXERCISES: CPT

## 2023-04-06 PROCEDURE — G0283 ELEC STIM OTHER THAN WOUND: HCPCS

## 2023-04-06 ASSESSMENT — PAIN DESCRIPTION - ORIENTATION: ORIENTATION: RIGHT

## 2023-04-06 ASSESSMENT — PAIN DESCRIPTION - LOCATION: LOCATION: SHOULDER

## 2023-04-06 ASSESSMENT — PAIN SCALES - GENERAL: PAINLEVEL_OUTOF10: 5

## 2023-04-06 NOTE — PROGRESS NOTES
(IFC)  E-stim via: 4 Electrode Pads  E-stim Parameters: 10 min application  Post treatment skin assessment: Intact       *Indicates exercise, modality, or manual techniques to be initiated when appropriate    Objective Measures:   R shoulder PROM  90 deg ABD  90 deg Flex  30 deg ER in neutral    Assessment: Body Structures, Functions, Activity Limitations Requiring Skilled Therapeutic Intervention: Increased pain, Decreased ROM, Decreased strength, Decreased ADL status, Decreased functional mobility , Decreased high-level IADLs, Decreased tolerance to work activity  Assessment: Pt tolerating PROM to 90/90/30 well. Mild pain with verbalized stretch at end ranges still noted with slow and controlled oscillations. Continue to remind pt of post op precautions to prevent active use of R arm. E-stim used post activity today to aide in ongoing pain mgmt. No pain at end of session. Treatment Diagnosis: R shoulder pain, decreased R shoulder P/AROM, decreased RUE strength, impaired reaching and lifting activity tolerance  Therapy Prognosis: Good       Post-Pain Assessment:       Pain Rating (0-10 pain scale):  0 /10   Location and pain description same as pre-treatment unless indicated. Action: [x] NA   [] Perform HEP  [] Meds as prescribed  [x] Modalities as prescribed   [] Call Physician     GOALS   Patient Goal(s): Patient Goals : \"be able to use right arm; shoot a fiream; be able to pass a physical fitness test for work\"    Long Term Goals Completed by 10 weeks Goal Status   LTG 1 Full R shoulder AROM w/o pain to perform all reaching tasks In progress   LTG 2 R shoulder strength 5/5 w/o pain to resistance for improved lift, push, and pull activity tolerance In progress   LTG 3 Decreased R shoulder pain to < 2/10 with all ADLS, work, and recreational activity.  In progress   LTG 4 UEFI score > 75/80 In progress   LTG 5 [place] with HEP and therapeutic pain mgmt techniques In progress

## 2023-04-19 ENCOUNTER — APPOINTMENT (OUTPATIENT)
Dept: PHYSICAL THERAPY | Age: 53
End: 2023-04-19
Payer: COMMERCIAL

## 2023-04-21 ENCOUNTER — HOSPITAL ENCOUNTER (OUTPATIENT)
Dept: PHYSICAL THERAPY | Age: 53
Setting detail: THERAPIES SERIES
Discharge: HOME | End: 2023-04-21
Payer: COMMERCIAL

## 2023-04-21 PROCEDURE — 97110 THERAPEUTIC EXERCISES: CPT

## 2023-04-21 ASSESSMENT — PAIN SCALES - GENERAL: PAINLEVEL_OUTOF10: 1

## 2023-04-21 ASSESSMENT — PAIN DESCRIPTION - ORIENTATION: ORIENTATION: RIGHT

## 2023-04-21 ASSESSMENT — PAIN DESCRIPTION - LOCATION: LOCATION: SHOULDER

## 2023-04-21 NOTE — PROGRESS NOTES
pain to resistance for improved lift, push, and pull activity tolerance In progress   LTG 3 Decreased R shoulder pain to < 2/10 with all ADLS, work, and recreational activity. In progress   LTG 4 UEFI score > 75/80 In progress   LTG 5 [place] with HEP and therapeutic pain mgmt techniques In progress         Plan:  Frequency/Duration:  Plan  Plan Frequency: 1-2  Plan weeks: 10  Current Treatment Recommendations: Modalities, Dry needling, Home exercise program, Patient/Caregiver education & training, ROM, Strengthening, Neuromuscular re-education, Manual, Pain management, Return to work related activity  Modalities: Heat/Cold, Ultrasound, E-stim - unattended  Additional Comments: starting 1x/wk throughout phase II activities  Pt to continue current HEP. See objective section for any therapeutic exercise changes, additions or modifications this date.     Therapy Time:      PT Individual Minutes  Time In: 1100  Time Out: 1130  Minutes: 30  Timed Code Treatment Minutes: 30 Minutes  Procedure Minutes: 0  Timed Activity Minutes Units   Ther Ex 30 2     Electronically signed by Elin Correa PT on 4/21/23 at 11:53 AM EDT

## 2023-04-28 ENCOUNTER — HOSPITAL ENCOUNTER (OUTPATIENT)
Dept: PHYSICAL THERAPY | Age: 53
Setting detail: THERAPIES SERIES
Discharge: HOME | End: 2023-04-28
Payer: COMMERCIAL

## 2023-04-28 PROCEDURE — 97110 THERAPEUTIC EXERCISES: CPT

## 2023-04-28 NOTE — PROGRESS NOTES
Frequency: 1-2  Plan weeks: 10  Current Treatment Recommendations: Modalities, Dry needling, Home exercise program, Patient/Caregiver education & training, ROM, Strengthening, Neuromuscular re-education, Manual, Pain management, Return to work related activity  Modalities: Heat/Cold, Ultrasound, E-stim - unattended  Additional Comments: starting 1x/wk throughout phase II activities  Pt to continue current HEP. See objective section for any therapeutic exercise changes, additions or modifications this date.     Therapy Time:      PT Individual Minutes  Time In: 6369  Time Out: 3861  Minutes: 33  Timed Code Treatment Minutes: 33 Minutes  Procedure Minutes:0  Timed Activity Minutes Units   Ther Ex 33 2     Electronically signed by Nathaly Stockton PTA on 4/28/23 at 12:20 PM EDT

## 2023-05-02 ENCOUNTER — OFFICE VISIT (OUTPATIENT)
Dept: ORTHOPEDIC SURGERY | Age: 53
End: 2023-05-02

## 2023-05-02 DIAGNOSIS — M75.121 COMPLETE TEAR OF RIGHT ROTATOR CUFF, UNSPECIFIED WHETHER TRAUMATIC: Primary | ICD-10-CM

## 2023-05-02 DIAGNOSIS — M75.41 IMPINGEMENT SYNDROME OF RIGHT SHOULDER: ICD-10-CM

## 2023-05-02 DIAGNOSIS — Z98.890 S/P ARTHROSCOPY OF RIGHT SHOULDER: ICD-10-CM

## 2023-05-02 PROCEDURE — 99024 POSTOP FOLLOW-UP VISIT: CPT | Performed by: STUDENT IN AN ORGANIZED HEALTH CARE EDUCATION/TRAINING PROGRAM

## 2023-05-02 RX ORDER — BUPROPION HYDROCHLORIDE 150 MG/1
150 TABLET ORAL DAILY
COMMUNITY
Start: 2023-04-16

## 2023-05-02 ASSESSMENT — ENCOUNTER SYMPTOMS
EYES NEGATIVE: 1
GASTROINTESTINAL NEGATIVE: 1
RESPIRATORY NEGATIVE: 1
ALLERGIC/IMMUNOLOGIC NEGATIVE: 1

## 2023-05-02 NOTE — PROGRESS NOTES
Orthopedic Surgery and Sports Medicine    Subjective:      Patient ID: Richy Fontenot is a 46 y.o. male who presents today for:  Chief Complaint   Patient presents with    Follow-up     Follow up visit for RT shoulder pain, 3 months out       HPI  Madhuri Rodriguez is a 51-year-old male who presents today for postoperative evaluation. He is 3 months status post right shoulder arthroscopic rotator cuff repair and subacromial decompression. Date of surgery was 2/8/2023. Overall doing well. He reports some mild intermittent pain, mostly with physical therapy. He also reports an intermittent clicking sensation in the shoulder. He has had a few episodes where he reactively went to go use and reach with his right arm and felt immediate pain in the right shoulder. Otherwise progressing appropriately with physical therapy. He denies any new onset numbness or tingling in the operative extremity. He denies any recent fever or chills. Past Medical History:   Diagnosis Date    Chronic back pain     GERD (gastroesophageal reflux disease)     Heartburn     HTN (hypertension)       Past Surgical History:   Procedure Laterality Date    APPENDECTOMY      BACK SURGERY  1997    L 4-5 microdisc.     COLONOSCOPY N/A 04/16/2021    COLORECTAL CANCER SCREENING, NOT HIGH RISK performed by Ivan Otto MD at [place] [address] ARTHROSCOPY Left 09/23/2020    ARTHROSCOPY LEFT KNEE, PARTIAL MEDIAL MENISECTOMY performed by Cristina Corona MD at [address] [place] [address]  2012    C 3/4 ACDF    PILONIDAL CYST EXCISION  2005    SHOULDER ARTHROSCOPY Right 2/8/2023    Right Arthroscopic rotator cuff repair, subacromial decompression performed by Sander Wang MD at [address] [place] [address]  2008    Hiatal hernia, normal stomach, normal duodenum    UPPER GASTROINTESTINAL ENDOSCOPY N/A 01/06/2023    EGD ESOPHAGOGASTRODUODENOSCOPY performed by Ivan Otto MD at [address] [place] [address]

## 2023-05-05 ENCOUNTER — HOSPITAL ENCOUNTER (OUTPATIENT)
Dept: PHYSICAL THERAPY | Age: 53
Setting detail: THERAPIES SERIES
Discharge: HOME | End: 2023-05-05
Payer: COMMERCIAL

## 2023-05-12 ENCOUNTER — HOSPITAL ENCOUNTER (OUTPATIENT)
Dept: PHYSICAL THERAPY | Age: 53
Setting detail: THERAPIES SERIES
Discharge: HOME | End: 2023-05-12
Payer: COMMERCIAL

## 2023-05-12 PROCEDURE — 97110 THERAPEUTIC EXERCISES: CPT

## 2023-05-12 ASSESSMENT — PAIN DESCRIPTION - LOCATION: LOCATION: SHOULDER

## 2023-05-12 ASSESSMENT — PAIN DESCRIPTION - DESCRIPTORS: DESCRIPTORS: DULL

## 2023-05-12 ASSESSMENT — PAIN SCALES - GENERAL: PAINLEVEL_OUTOF10: 1

## 2023-05-12 ASSESSMENT — PAIN DESCRIPTION - PAIN TYPE: TYPE: SURGICAL PAIN

## 2023-05-12 ASSESSMENT — PAIN DESCRIPTION - ORIENTATION: ORIENTATION: RIGHT

## 2023-05-12 NOTE — PROGRESS NOTES
strength 5/5 w/o pain to resistance for improved lift, push, and pull activity tolerance In progress   LTG 3 Decreased R shoulder pain to < 2/10 with all ADLS, work, and recreational activity. In progress   LTG 4 UEFI score > 75/80 In progress   LTG 5 [place] with HEP and therapeutic pain mgmt techniques In progress               Plan:  Frequency/Duration:  Plan  Plan Frequency: 1-2  Plan weeks: 10  Current Treatment Recommendations: Modalities, Dry needling, Home exercise program, Patient/Caregiver education & training, ROM, Strengthening, Neuromuscular re-education, Manual, Pain management, Return to work related activity  Modalities: Heat/Cold, Ultrasound, E-stim - unattended  Additional Comments: starting 1x/wk throughout phase II activities  Pt to continue current HEP. See objective section for any therapeutic exercise changes, additions or modifications this date.     Therapy Time:      PT Individual Minutes  Time In: 1013  Time Out: 3208  Minutes: 39  Timed Code Treatment Minutes: 39 Minutes  Procedure Minutes: 0  Timed Activity Minutes Units   Ther Ex 39 3     Electronically signed by Aaron Celis PTA on 5/12/23 at 11:53 AM EDT

## 2023-05-19 ENCOUNTER — HOSPITAL ENCOUNTER (OUTPATIENT)
Dept: PHYSICAL THERAPY | Age: 53
Setting detail: THERAPIES SERIES
Discharge: HOME | End: 2023-05-19
Payer: COMMERCIAL

## 2023-05-19 PROCEDURE — 97110 THERAPEUTIC EXERCISES: CPT

## 2023-05-19 NOTE — PROGRESS NOTES
[address] [facility]  Outpatient Physical Therapy    Treatment Note        Date: 2023  Patient: Andreia Eng Angle  : 1970   Confirmed: Yes  MRN: [id number]  Referring Provider: Timi Pop MD    Medical Diagnosis: Complete tear of right rotator cuff, unspecified whether traumatic [M75.121]  Impingement syndrome of right shoulder [M75.41]  S/P arthroscopy of right shoulder [Z98.890]       Treatment Diagnosis: R shoulder pain, decreased R shoulder P/AROM, decreased RUE strength, impaired reaching and lifting activity tolerance    Visit Information:  Insurance: Payor: René Simpson 150 / Plan: Agueda Linea / Product Type: *No Product type* /   PT Visit Information  Onset Date: 23  PT Insurance Information: BCBS  Total # of Visits Approved: 30  Total # of Visits to Date: 8  No Show: 0  Canceled Appointment: 3  Progress Note Counter:     Subjective Information:\"I am feeling better. \" Patient denies recent \"popping/clicking. \"    HEP Compliance:  [x] Good [] Fair [] Poor [] Reports not doing due to:    Pain Screening  Patient Currently in Pain: Denies    Treatment:  Exercises:  Exercises  Exercise 1: **phase III exercises to begin 23, pay attention to days of new exercise implementation throughout phase III  Exercise 2: PROM right shoulder 15 minutes  Exercise 8: wall slides flexion, scaption 5\"x10  Exercise 9: S/L shoulder ER x 15  Exercise 10: shoulder flexion to 90 x10  Exercise 12: sub-max shoulder isometrics 4 way 5\"x10  Exercise 20: HEP: continue with current   Assessment: Body Structures, Functions, Activity Limitations Requiring Skilled Therapeutic Intervention: Increased pain, Decreased ROM, Decreased strength, Decreased ADL status, Decreased functional mobility , Decreased high-level IADLs, Decreased tolerance to work activity  Assessment: Continued exercise progressions per protocol. Patient demonstrates mild muscle guarding during PROM.  C/o 1 episode of shoulder

## 2023-05-24 ENCOUNTER — HOSPITAL ENCOUNTER (OUTPATIENT)
Dept: PHYSICAL THERAPY | Age: 53
Setting detail: THERAPIES SERIES
Discharge: HOME | End: 2023-05-24
Payer: COMMERCIAL

## 2023-05-26 ENCOUNTER — APPOINTMENT (OUTPATIENT)
Dept: PHYSICAL THERAPY | Age: 53
End: 2023-05-26
Payer: COMMERCIAL

## 2023-05-31 ENCOUNTER — APPOINTMENT (OUTPATIENT)
Dept: PHYSICAL THERAPY | Age: 53
End: 2023-05-31
Payer: COMMERCIAL

## 2023-07-17 ENCOUNTER — OFFICE VISIT (OUTPATIENT)
Dept: ORTHOPEDIC SURGERY | Age: 53
End: 2023-07-17

## 2023-07-17 VITALS
SYSTOLIC BLOOD PRESSURE: 124 MMHG | HEART RATE: 70 BPM | BODY MASS INDEX: 29.13 KG/M2 | RESPIRATION RATE: 16 BRPM | HEIGHT: 74 IN | OXYGEN SATURATION: 99 % | DIASTOLIC BLOOD PRESSURE: 74 MMHG | WEIGHT: 227 LBS

## 2023-07-17 DIAGNOSIS — M75.121 COMPLETE TEAR OF RIGHT ROTATOR CUFF, UNSPECIFIED WHETHER TRAUMATIC: ICD-10-CM

## 2023-07-17 DIAGNOSIS — Z98.890 S/P ARTHROSCOPY OF RIGHT SHOULDER: Primary | ICD-10-CM

## 2023-07-17 ASSESSMENT — ENCOUNTER SYMPTOMS
RESPIRATORY NEGATIVE: 1
ALLERGIC/IMMUNOLOGIC NEGATIVE: 1
EYES NEGATIVE: 1
GASTROINTESTINAL NEGATIVE: 1

## 2023-07-17 NOTE — PROGRESS NOTES
Orthopedic Surgery and Sports Medicine    Subjective:      Patient ID: Zeferino Dennis is a 46 y.o. male who presents today for:  Chief Complaint   Patient presents with    Follow-up     Follow up visit for RT shoulder pain       HPI  Arturo Ritchie is a 20-year-old male who presents today for postoperative evaluation. He is 5 months status post right shoulder arthroscopic rotator cuff repair and subacromial decompression. Date of surgery was 2/8/2023. He did approximately 3 months of physical therapy postoperatively. He had to put a hold on physical therapy because he was sent out of [place] 2 [place] for work. He has just returned. He reports that he is overall happy with his shoulder. He does have some mild pain/discomfort especially in the morning if he sleeps on that shoulder. Otherwise it does not cause him any significant issues. He still feels like he has some mild weakness in the shoulder. He denies any recent fever chills or new onset numbness or tingling in the right upper extremity. He has now retired from work. Past Medical History:   Diagnosis Date    Chronic back pain     GERD (gastroesophageal reflux disease)     Heartburn     HTN (hypertension)       Past Surgical History:   Procedure Laterality Date    APPENDECTOMY      BACK SURGERY  1997    L 4-5 microdisc.     COLONOSCOPY N/A 04/16/2021    COLORECTAL CANCER SCREENING, NOT HIGH RISK performed by Carmen Xie MD at [facility] ARTHROSCOPY Left 09/23/2020    ARTHROSCOPY LEFT KNEE, PARTIAL MEDIAL MENISECTOMY performed by Franky Denver, MD at [address] [place] [address]  2012    C 3/4 ACDF    PILONIDAL CYST EXCISION  2005    SHOULDER ARTHROSCOPY Right 2/8/2023    Right Arthroscopic rotator cuff repair, subacromial decompression performed by Chel Orr MD at [address] [place] [address]  2008    Hiatal hernia, normal stomach, normal duodenum    UPPER GASTROINTESTINAL ENDOSCOPY N/A

## 2023-07-25 ENCOUNTER — HOSPITAL ENCOUNTER (OUTPATIENT)
Dept: PHYSICAL THERAPY | Age: 53
Setting detail: THERAPIES SERIES
Discharge: HOME | End: 2023-07-25
Attending: STUDENT IN AN ORGANIZED HEALTH CARE EDUCATION/TRAINING PROGRAM
Payer: COMMERCIAL

## 2023-07-25 PROCEDURE — 97110 THERAPEUTIC EXERCISES: CPT

## 2023-07-25 PROCEDURE — 97161 PT EVAL LOW COMPLEX 20 MIN: CPT

## 2023-07-25 ASSESSMENT — PAIN SCALES - GENERAL: PAINLEVEL_OUTOF10: 0

## 2023-07-25 NOTE — PROGRESS NOTES
[address] [facility]  PHYSICAL THERAPY PLAN OF CARE   [address] [facility] [place] [place], [address] [facility]       Phone: [phone]  Fax: [phone]    [] Certification  [] Recertification [x]  Plan of Care  [] Progress Note [] Discharge      Referring Provider: Keri Mijares MD     From:  Dejah Ferrari, SPT/Leelee Mckinney PT, DPT  Patient: Millie Gilford (46 y.o. male) : 1970 Date: 2023  Medical Diagnosis: S/P arthroscopy of right shoulder [Z98.890]  Complete tear of right rotator cuff, unspecified whether traumatic [M75.121]       Treatment Diagnosis: decreased R shoulder AROM/PROM, decreased B periscapular strength, decreased R shoulder strength, decreased activity tolerance, and decreased R shoulder joint capsule mobility inferior and posterior    Plan of Care/Certification Expiration Date:     Progress Report Period from:  2023  to 2023    Visits to Date: 1 No Show: 0 Cancelled Appts: 0    OBJECTIVE:   Long Term Goals - Time Frame for Long Term Goals : 6 weeks  Goals Current/ Discharge status Status   Long Term Goal 1: Patient will demo pain-free R shoulder AROM WNL in all planes in order to perform overhead/reaching activities and ADL's. AROM RUE (degrees)  R Shoulder Flexion (0-180): 143 (tight)  R Shoulder ABduction (0-180): 125 (tight)  R Shoulder Int Rotation  (0-70): R iliac crest  R Shoulder Ext Rotation (0-90): R superior border of scap      PROM RUE (degrees)  R Shoulder Flex  (0-180): 140  R Shoulder ABduction (0-180): 141 New   Long Term Goal 2: Patient will demo R shoulder strength 5/5 and B periscapular strength >/=4+/5 in order to improve ease with functional reaching/lifting.  L Middle Trapezius: 4/5  L Rhomboids: 4/5  L Lower Trapezius: 4+/5  L Latissimus Dorsi: 4+/5    Strength RUE  R Shoulder Flexion: 4/5  R Shoulder ABduction: 4-/5 (discomfort)  R Shoulder Internal Rotation: 5/5  R Shoulder External Rotation: 5/5  R
normal duodenum    UPPER GASTROINTESTINAL ENDOSCOPY N/A 01/06/2023    EGD ESOPHAGOGASTRODUODENOSCOPY performed by Tal Pablo MD at [facility]       Medications:   Current Outpatient Medications:     buPROPion (WELLBUTRIN XL) 150 MG extended release tablet, Take 1 tablet by mouth daily, Disp: , Rfl:     promethazine (PHENERGAN) 25 MG tablet, TAKE 1 TABLET BY MOUTH FOUR TIMES A DAY AS NEEDED FOR NAUSEA, Disp: 20 tablet, Rfl: 0    escitalopram (LEXAPRO) 20 MG tablet, Take 1 tablet by mouth daily, Disp: , Rfl:     sucralfate (CARAFATE) 1 GM tablet, Take 1 tablet by mouth 4 times daily, Disp: 30 tablet, Rfl: 3    ibuprofen (ADVIL;MOTRIN) 600 MG tablet, Take 1 tablet by mouth every 8 hours as needed for Pain, Disp: 30 tablet, Rfl: 0    chlorhexidine (HIBICLENS) 4 % external liquid, Apply topically daily for 3 days prior to surgery. , Disp: 118 mL, Rfl: 0    clonazePAM (KLONOPIN) 1 MG tablet, , Disp: , Rfl:     meloxicam (MOBIC) 15 MG tablet, Take 1 tablet by mouth daily as needed for Pain, Disp: 30 tablet, Rfl: 0    amLODIPine (NORVASC) 5 MG tablet, Take 1 tab by mouth daily, Disp: 90 tablet, Rfl: 3    omeprazole (PRILOSEC) 20 MG delayed release capsule, Take one cap by mouth daily, Disp: 90 capsule, Rfl: 3  Allergies: Patient has no known allergies. Imaging (if applicable): No results found. SUBJECTIVE EXAMINATION     History obtained from[de-identified] Patient, Chart Review,      Family/Caregiver Present: No    Subjective History: Onset Date: 02/08/23  Subjective: Patient returning to therapy for R RC repair (2/8/23) due to continued ROM deficits, primarily in IR and flexion per patient. Patient denies any current restrictions from surgery. Patient reports stiffness in morning after sleeping on side, pain otherwise controlled. Patient reports taking hot shower helps the stiffness. Patient reports doing exercises previously given to him during OP PT \"occasionally\". Patient states his primary limitations are strength.

## 2023-07-27 ENCOUNTER — HOSPITAL ENCOUNTER (OUTPATIENT)
Dept: PHYSICAL THERAPY | Age: 53
Setting detail: THERAPIES SERIES
Discharge: HOME | End: 2023-07-27
Attending: STUDENT IN AN ORGANIZED HEALTH CARE EDUCATION/TRAINING PROGRAM
Payer: COMMERCIAL

## 2023-07-27 PROCEDURE — 97110 THERAPEUTIC EXERCISES: CPT

## 2023-07-27 PROCEDURE — 97140 MANUAL THERAPY 1/> REGIONS: CPT

## 2023-07-27 NOTE — PROGRESS NOTES
[address] [facility]  Outpatient Physical Therapy    Treatment Note        Date: 2023  Patient: Madisyn Jj Angle  : 1970   Confirmed: Yes  MRN: [id number]  Referring Provider: Larissa Baker MD    Medical Diagnosis: S/P arthroscopy of right shoulder [Z98.890]  Complete tear of right rotator cuff, unspecified whether traumatic [M75.121]       Treatment Diagnosis: decreased R shoulder AROM/PROM, decreased B periscapular strength, decreased R shoulder strength, decreased activity tolerance, and decreased R shoulder joint capsule mobility inferior and posterior    Visit Information:  Insurance: Payor: Aleida Bold / Plan: [employer] Basket / Product Type: *No Product type* /   PT Visit Information  Onset Date: 23  Total # of Visits Approved: 80 (BMN)  Total # of Visits to Date: 2  No Show: 0  Canceled Appointment: 0  Progress Note Counter: -    Subjective Information:  Subjective: Patient reports soreness following intitial evaluation, soreness lasting ~ half a day. Patient states he would like to try golfing over the weekend. HEP Compliance:  [x] Good [] Fair [] Poor [] Reports not doing due to:    Pain Screening  Patient Currently in Pain: Denies    Treatment:  Exercises:  Exercises  Exercise 1: sleeper stretch R UE 30\" x3  Exercise 2: IR stretch with strap 10\" x10  Exercise 3: corner ER stretch 30\" x10  Exercise 4: prone scap*  Exercise 5: RTB rows x15  Exercise 6: UBE*  Exercise 7: resisted shoulder flexion/abduction*  Exercise 8: YTB ER/IR arm at side  Exercise 9: pulleys flexion and abduction x 3 min each (for improved ROM)  Exercise 10: finger ladder flexion and abduction 5\" x5  Exercise 11: GTB lats x 15  Exercise 20: HEP: cont current  Treatment Reasoning  Limitations addressed:  Mobility    Manual:   Manual Therapy  Joint Mobilization: R shoulder inferior, posterior glides for increased motion (performed inferior glides at end range flexion and end range

## 2023-08-02 ENCOUNTER — HOSPITAL ENCOUNTER (OUTPATIENT)
Dept: PHYSICAL THERAPY | Age: 53
Setting detail: THERAPIES SERIES
Discharge: HOME | End: 2023-08-02
Attending: STUDENT IN AN ORGANIZED HEALTH CARE EDUCATION/TRAINING PROGRAM
Payer: COMMERCIAL

## 2023-08-02 PROCEDURE — 97110 THERAPEUTIC EXERCISES: CPT

## 2023-08-02 ASSESSMENT — PAIN DESCRIPTION - DESCRIPTORS: DESCRIPTORS: DULL

## 2023-08-02 ASSESSMENT — PAIN DESCRIPTION - ORIENTATION: ORIENTATION: RIGHT

## 2023-08-02 ASSESSMENT — PAIN DESCRIPTION - LOCATION: LOCATION: SHOULDER

## 2023-08-02 ASSESSMENT — PAIN DESCRIPTION - PAIN TYPE: TYPE: SURGICAL PAIN

## 2023-08-02 ASSESSMENT — PAIN SCALES - GENERAL: PAINLEVEL_OUTOF10: 2

## 2023-08-02 NOTE — PROGRESS NOTES
appropriate    Objective Measures:         LTG 1 Current Status[de-identified] 7/27/23: AROM flexion 160* abduction 149*, IR R PSIS, ER base of occiput (painful with all motions at end range)        Assessment: Body Structures, Functions, Activity Limitations Requiring Skilled Therapeutic Intervention: Decreased ROM, Decreased strength, Decreased ADL status, Decreased functional mobility , Decreased high-level IADLs  Assessment: Pt reports continued difficulty and pain with IR stretch behind the back. Made modifications for improved tolerance this date with fair results. Educated pt on anatomy of shoulder and importance of posture with verbalized understanding and poor to fair carryover. Progress as able. Treatment Diagnosis: decreased R shoulder AROM/PROM, decreased B periscapular strength, decreased R shoulder strength, decreased activity tolerance, and decreased R shoulder joint capsule mobility inferior and posterior  Therapy Prognosis: Good       Patient Education: [x] NA       Post-Pain Assessment:       Pain Rating (0-10 pain scale):   0 /10   Location and pain description same as pre-treatment unless indicated. Action: [] NA   [x] Perform HEP  [] Meds as prescribed  [] Modalities as prescribed   [] Call Physician     GOALS   Patient Goal(s): Patient Goals : \"R shoulder strength\"       Long Term Goals Completed by 6 weeks Goal Status   LTG 1 Patient will demo pain-free R shoulder AROM WNL in all planes in order to perform overhead/reaching activities and ADL's. In progress   LTG 2 Patient will demo R shoulder strength 5/5 and B periscapular strength >/=4+/5 in order to improve ease with functional reaching/lifting. In progress   LTG 3 Patient will be compliant and demonstrate independent understanding of HEP in order to self-manage symptoms at time of discharge.  In progress               Plan:  Frequency/Duration:  Plan  Plan Frequency: 1-2  Plan weeks: 6  Specific Instructions for Next Treatment: Focus on ROM

## 2023-08-08 ENCOUNTER — HOSPITAL ENCOUNTER (OUTPATIENT)
Dept: PHYSICAL THERAPY | Age: 53
Setting detail: THERAPIES SERIES
Discharge: HOME | End: 2023-08-08
Attending: STUDENT IN AN ORGANIZED HEALTH CARE EDUCATION/TRAINING PROGRAM
Payer: COMMERCIAL

## 2023-08-08 PROCEDURE — 97140 MANUAL THERAPY 1/> REGIONS: CPT

## 2023-08-08 PROCEDURE — 97110 THERAPEUTIC EXERCISES: CPT

## 2023-08-08 NOTE — PROGRESS NOTES
Patient/Caregiver education & training, Safety education & training, Modalities, Dry needling  Modalities: Heat/Cold, E-stim - unattended, Ultrasound  Pt to continue current HEP. See objective section for any therapeutic exercise changes, additions or modifications this date.     Therapy Time:      PT Individual Minutes  Time In: 1100  Time Out: 2550  Minutes: 44  Timed Code Treatment Minutes: 44 Minutes  Procedure Minutes:0  Timed Activity Minutes Units   Ther Ex 36 2   Manual  8 1     Electronically signed by Trinity Burgos PTA on 8/8/23 at 12:21 PM EDT

## 2023-08-10 ENCOUNTER — HOSPITAL ENCOUNTER (OUTPATIENT)
Dept: PHYSICAL THERAPY | Age: 53
Setting detail: THERAPIES SERIES
Discharge: HOME | End: 2023-08-10
Attending: STUDENT IN AN ORGANIZED HEALTH CARE EDUCATION/TRAINING PROGRAM
Payer: COMMERCIAL

## 2023-08-10 PROCEDURE — 97110 THERAPEUTIC EXERCISES: CPT

## 2023-08-10 NOTE — PROGRESS NOTES
[address] [facility]  Outpatient Physical Therapy    Treatment Note        Date: 8/10/2023  Patient: Esvin Dejesus  : 1970   Confirmed: Yes  MRN: [id number]  Referring Provider: Heath Porter MD    Medical Diagnosis: S/P arthroscopy of right shoulder [Z98.890]  Complete tear of right rotator cuff, unspecified whether traumatic [M75.121]       Treatment Diagnosis: decreased R shoulder AROM/PROM, decreased B periscapular strength, decreased R shoulder strength, decreased activity tolerance, and decreased R shoulder joint capsule mobility inferior and posterior    Visit Information:  Insurance: Payor: Nick Herrera / Plan: Payton Flores / Product Type: *No Product type* /   PT Visit Information  Onset Date: 23  Total # of Visits Approved: 80 (BMN)  Total # of Visits to Date: 5  No Show: 0  Canceled Appointment: 0  Progress Note Counter:     Subjective Information:  Subjective: Pt denies any pain upon arrival to therapy today, pt stated he's the most sore following his therapy session, able to manage increase pain levels at home with ice and tens unit. Pt reports good complaince with HEP, performing daily. \"IR motions/stretches are the worse\"  HEP Compliance:  [x] Good [] Fair [] Poor [] Reports not doing due to:    Pain Screening  Patient Currently in Pain: Denies    Treatment:  Exercises:  Exercises  Exercise 5: GTB rows x15  Exercise 6: UBE x 2.5/2.5 x 5 mins L 2.0   total F/R  Exercise 7: Apollo chest press 30# 2 x 10 / Incline press 30# 2 x 10 / Lat pull down Big bar 30# x 10 wide , x 10 with reverse narrow . Exercise 8: GTB ER/IR arm at side x 10  Exercise 11: GTB lats x 15  Exercise 14: Prone snow angles (shld mobility) 2 x 5  Exercise 20: HEP: cont current  Treatment Reasoning  Limitations addressed: Mobility, Strength      Objective Measures:          Assessment:    Body Structures, Functions, Activity Limitations Requiring Skilled Therapeutic

## 2023-08-15 ENCOUNTER — HOSPITAL ENCOUNTER (OUTPATIENT)
Dept: PHYSICAL THERAPY | Age: 53
Setting detail: THERAPIES SERIES
Discharge: HOME | End: 2023-08-15
Attending: STUDENT IN AN ORGANIZED HEALTH CARE EDUCATION/TRAINING PROGRAM
Payer: COMMERCIAL

## 2023-08-15 LAB
CHOLEST SERPL-MCNC: 175 MG/DL (ref 0–199)
GLUCOSE SERPL-MCNC: 100 MG/DL (ref 70–99)
HDLC SERPL-MCNC: 60 MG/DL (ref 40–59)
LDLC SERPL CALC-MCNC: 101 MG/DL (ref 0–129)
TRIGL SERPL-MCNC: 71 MG/DL (ref 0–150)

## 2023-08-15 NOTE — PROGRESS NOTES
Therapy                            Cancellation/No-show Note      Date: 08/15/2023  Patient: Jesse Regalado (36 y.o. male)  : 1970  MRN:  [id number]  Referring Physician: Cynthia Christensen MD    Medical Diagnosis: S/P arthroscopy of right shoulder [Z98.890]  Complete tear of right rotator cuff, unspecified whether traumatic [M75.121]      Visit Information:  Visits to Date 5   No Show/Cancelled Appts: 0       For today's appointment patient:  [x]  Cancelled  []  Rescheduled appointment  []  No-show   []  Called pt to remind of next appointment     Reason given by patient:  [x]  Patient ill  []  Conflicting appointment  []  No transportation    []  Conflict with work  []  No reason given  []  Other:      [x] Pt has future appointments scheduled, no follow up needed  [] Pt requests to be on hold.     Reason:   If > 2 weeks please discuss with therapist.  [] Therapist to call pt for follow up     Comments:       Signature: Electronically signed by Conor Silverio PTA on 8/15/23 at 8:16 AM EDT

## 2023-08-17 ENCOUNTER — HOSPITAL ENCOUNTER (OUTPATIENT)
Dept: PHYSICAL THERAPY | Age: 53
Setting detail: THERAPIES SERIES
Discharge: HOME | End: 2023-08-17
Attending: STUDENT IN AN ORGANIZED HEALTH CARE EDUCATION/TRAINING PROGRAM
Payer: COMMERCIAL

## 2023-08-17 PROCEDURE — 97110 THERAPEUTIC EXERCISES: CPT

## 2023-08-17 ASSESSMENT — PAIN DESCRIPTION - LOCATION: LOCATION: SHOULDER

## 2023-08-17 ASSESSMENT — PAIN DESCRIPTION - ORIENTATION: ORIENTATION: RIGHT

## 2023-08-17 ASSESSMENT — PAIN SCALES - GENERAL: PAINLEVEL_OUTOF10: 2

## 2023-08-17 ASSESSMENT — PAIN DESCRIPTION - DESCRIPTORS: DESCRIPTORS: SORE

## 2023-08-17 NOTE — PROGRESS NOTES
[facility]  Outpatient Physical Therapy    Treatment Note        Date: 2023  Patient: Santo Dejesus  : 1970   Confirmed: Yes  MRN: [id number]  Referring Provider: Jessica Shay MD    Medical Diagnosis: S/P arthroscopy of right shoulder [Z98.890]  Complete tear of right rotator cuff, unspecified whether traumatic [M75.121]            Visit Information:  Insurance: Payor: [employer] / Plan: Yenni Landaverde [facility] / Product Type: *No Product type* /   PT Visit Information  Onset Date: 23  PT Insurance Information: BCBS  Total # of Visits Approved: 99  Total # of Visits to Date: 6  No Show: 0  Canceled Appointment: 1  Progress Note Counter: -    Subjective Information:  Subjective: Pt reports not feeling well over the last few days. Was sore following last tx session with relief following ice and tens unit. Currently no pain with shoulder at rest and minimal pain with movement. Pt reports helping his friend dock his boat, pulled on a rope and got a \"pop\" in the shoulder had pain for the rest of that day, pain has since calmed down some. HEP Compliance:  [x] Good [] Fair [] Poor [] Reports not doing due to:    Pain Screening  Patient Currently in Pain: Yes  Pain Assessment: 0-10  Pain Level: 2  Pain Location: Shoulder  Pain Orientation: Right  Pain Descriptors: Sore    Treatment:  Exercises:  Exercises  Exercise 2: IR stretch with strap 10\" x10  IR with wand 10 x 10\"  Exercise 3: Standing shoulder flexion / ER with PVC pipe 10\" 10 x  Exercise 5: Apollo Lat pull down 40# 2 x 15  Exercise 6: UBE x 2.5/2.5 x 5 mins L 2.0   total F/R  Exercise 7: Apollo chest press 30# 2 x 15 / Incline press 40# 2 x 15 / Lat pull down Big bar 40# 2 x 15 wide , 2 x 15 with reverse narrow . Exercise 8: Apollo ER 20# x 15 /IR 30# x 15  Exercise 14: Prone snow angles (shld mobility) attempted, held due to pain.   Exercise 19: PROM Right shoulder ER/IR  Exercise 20: HEP:

## 2023-08-22 ENCOUNTER — HOSPITAL ENCOUNTER (OUTPATIENT)
Dept: PHYSICAL THERAPY | Age: 53
Setting detail: THERAPIES SERIES
Discharge: HOME | End: 2023-08-22
Attending: STUDENT IN AN ORGANIZED HEALTH CARE EDUCATION/TRAINING PROGRAM
Payer: COMMERCIAL

## 2023-08-22 PROCEDURE — 97110 THERAPEUTIC EXERCISES: CPT

## 2023-08-22 ASSESSMENT — PAIN DESCRIPTION - PAIN TYPE: TYPE: SURGICAL PAIN

## 2023-08-22 ASSESSMENT — PAIN SCALES - GENERAL: PAINLEVEL_OUTOF10: 4

## 2023-08-22 ASSESSMENT — PAIN DESCRIPTION - ORIENTATION: ORIENTATION: RIGHT

## 2023-08-22 ASSESSMENT — PAIN DESCRIPTION - LOCATION: LOCATION: SHOULDER

## 2023-08-22 ASSESSMENT — PAIN DESCRIPTION - DESCRIPTORS: DESCRIPTORS: SORE

## 2023-08-22 NOTE — PROGRESS NOTES
[address] [facility]  Outpatient Physical Therapy    Treatment Note        Date: 2023  Patient: Franki Dejesus  : 1970   Confirmed: Yes  MRN: [id number]  Referring Provider: Christine Ahuja MD    Medical Diagnosis: S/P arthroscopy of right shoulder [Z98.890]  Complete tear of right rotator cuff, unspecified whether traumatic [M75.121]       Treatment Diagnosis: decreased R shoulder AROM/PROM, decreased B periscapular strength, decreased R shoulder strength, decreased activity tolerance, and decreased R shoulder joint capsule mobility inferior and posterior    Visit Information:  Insurance: Payor: [employer] Cover / [facility] / Product Type: *No Product type* /   PT Visit Information  Onset Date: 23  PT Insurance Information: BCBS  Total # of Visits Approved: 99  Total # of Visits to Date: 7  No Show: 0  Canceled Appointment: 1  Progress Note Counter: -    Subjective Information:  Subjective: Pt reports being pretty sore over the weekend after last session. Had to use cold packs daily. HEP Compliance:  [x] Good [] Fair [] Poor [] Reports not doing due to:    Pain Screening  Patient Currently in Pain: Yes  Pain Assessment: 0-10  Pain Level: 4  Pain Type: Surgical pain  Pain Location: Shoulder  Pain Orientation: Right  Pain Descriptors: Sore    Treatment:  Exercises:  Exercises  Exercise 1: Apollo cable press (SA) 2 x 15 (20#/30#)  Exercise 2: Apollo Pull Downs 2 x 15(20#/30#)  Exercise 3: IR stretch with wand(lifting up back) 10 x10\")  Exercise 6: UBE x 2/2 x 4 mins L 1.0   total F/R  Exercise 7: Lat pull down Big bar 40# 1 x 15 wide , 1 x 15 with reverse narrow . Exercise 8: [place] ER/IR 2 x 15 RTB  Exercise 19: PROM Right shoulder ER/IR  Exercise 20: HEP: cont current  Treatment Reasoning  Limitations addressed: Mobility, Strength    Objective Measures:     Assessment:    Body Structures, Functions, Activity Limitations Requiring Skilled

## 2023-08-24 ENCOUNTER — HOSPITAL ENCOUNTER (OUTPATIENT)
Dept: PHYSICAL THERAPY | Age: 53
Setting detail: THERAPIES SERIES
Discharge: HOME | End: 2023-08-24
Attending: STUDENT IN AN ORGANIZED HEALTH CARE EDUCATION/TRAINING PROGRAM
Payer: COMMERCIAL

## 2023-08-24 PROCEDURE — 97110 THERAPEUTIC EXERCISES: CPT

## 2023-08-24 ASSESSMENT — PAIN DESCRIPTION - ORIENTATION: ORIENTATION: RIGHT;OUTER;PROXIMAL

## 2023-08-24 ASSESSMENT — PAIN SCALES - GENERAL: PAINLEVEL_OUTOF10: 4

## 2023-08-24 ASSESSMENT — PAIN DESCRIPTION - DESCRIPTORS: DESCRIPTORS: SORE

## 2023-08-24 ASSESSMENT — PAIN DESCRIPTION - PAIN TYPE: TYPE: SURGICAL PAIN

## 2023-08-24 ASSESSMENT — PAIN DESCRIPTION - LOCATION: LOCATION: SHOULDER

## 2023-08-24 NOTE — PROGRESS NOTES
[address] [facility]  Outpatient Physical Therapy    Treatment Note        Date: 2023  Patient: Marlene Dejesus  : 1970   Confirmed: Yes  MRN: [id number]  Referring Provider: Princess Joey MD    Medical Diagnosis: S/P arthroscopy of right shoulder [Z98.890]  Complete tear of right rotator cuff, unspecified whether traumatic [M75.121]       Treatment Diagnosis: decreased R shoulder AROM/PROM, decreased B periscapular strength, decreased R shoulder strength, decreased activity tolerance, and decreased R shoulder joint capsule mobility inferior and posterior    Visit Information:  Insurance: Payor: Sandie Lisa / Plan: Yanelis Burnett / Product Type: *No Product type* /   PT Visit Information  Onset Date: 23  PT Insurance Information: BCBS  Total # of Visits Approved: 99  Total # of Visits to Date: 8  No Show: 0  Canceled Appointment: 1  Progress Note Counter: -    Subjective Information:  Subjective: Pt reports R shoulder feels the same as it did last visit. Pt believes the pain is still a result of helping dock a boat last week.   HEP Compliance:  [x] Good [] Fair [] Poor [] Reports not doing due to:    Pain Screening  Patient Currently in Pain: Yes  Pain Assessment: 0-10  Pain Level: 4  Pain Type: Surgical pain  Pain Location: Shoulder  Pain Orientation: Right, Outer, Proximal  Pain Descriptors: Sore    Treatment:  Exercises:  Exercises  Exercise 1: Apollo cable press  x 15 (30#)  Exercise 2: Apollo Cable Pull Downs  x 15 (40#)  Exercise 6: UBE x 2.5/2.5 x 5 mins L 1.0   total F/R  Exercise 7: wall slides x 10-5s (flexion/abduction)  Exercise 8: Apollo ER/IR  x 15 GTB  Exercise 19: PROM Right shoulder ER/IR, shoulder girdle x 8 minutes  Exercise 20: HEP: cont current       Manual:   Manual Therapy  Joint Mobilization: R shoulder inferior, posterior glides for increased motion (performed inferior glides at end range flexion and end range abduction) Grade 3-4 x 5

## 2023-08-29 ENCOUNTER — HOSPITAL ENCOUNTER (OUTPATIENT)
Dept: PHYSICAL THERAPY | Age: 53
Setting detail: THERAPIES SERIES
Discharge: HOME | End: 2023-08-29
Attending: STUDENT IN AN ORGANIZED HEALTH CARE EDUCATION/TRAINING PROGRAM
Payer: COMMERCIAL

## 2023-08-29 NOTE — PROGRESS NOTES
Therapy                            Cancellation/No-show Note      Date: 2023  Patient: Suzette Pena (49 y.o. male)  : 1970  MRN:  [id number]  Referring Physician: Sergio Fagan MD    Medical Diagnosis: S/P arthroscopy of right shoulder [Z98.890]  Complete tear of right rotator cuff, unspecified whether traumatic [M75.121]      Visit Information:  Visits to Date 8   No Show/Cancelled Appts: 0 / 2      For today's appointment patient:  [x]  Cancelled  []  Rescheduled appointment  []  No-show   []  Called pt to remind of next appointment     Reason given by patient:  [x]  Patient ill  []  Conflicting appointment  []  No transportation    []  Conflict with work  []  No reason given  []  Other:      [x] Pt has future appointments scheduled, no follow up needed  [] Pt requests to be on hold.     Reason:   If > 2 weeks please discuss with therapist.  [] Therapist to call pt for follow up     Comments:       Signature: Electronically signed by Luís Harry PTA on 23 at 9:41 AM EDT

## 2023-08-31 ENCOUNTER — HOSPITAL ENCOUNTER (OUTPATIENT)
Dept: PHYSICAL THERAPY | Age: 53
Setting detail: THERAPIES SERIES
Discharge: HOME | End: 2023-08-31
Attending: STUDENT IN AN ORGANIZED HEALTH CARE EDUCATION/TRAINING PROGRAM
Payer: COMMERCIAL

## 2023-08-31 PROCEDURE — 97110 THERAPEUTIC EXERCISES: CPT

## 2023-08-31 ASSESSMENT — PAIN SCALES - GENERAL: PAINLEVEL_OUTOF10: 0

## 2023-08-31 ASSESSMENT — PAIN DESCRIPTION - LOCATION: LOCATION: SHOULDER

## 2023-08-31 ASSESSMENT — PAIN DESCRIPTION - ORIENTATION: ORIENTATION: RIGHT

## 2023-08-31 ASSESSMENT — PAIN DESCRIPTION - PAIN TYPE: TYPE: SURGICAL PAIN

## 2023-08-31 ASSESSMENT — PAIN DESCRIPTION - DESCRIPTORS: DESCRIPTORS: SORE

## 2023-08-31 NOTE — PROGRESS NOTES
[address] [facility]  Outpatient Physical Therapy    Treatment Note        Date: 2023  Patient: Marcel Dejesus  : 1970   Confirmed: Yes  MRN: [id number]  Referring Provider: Anil Grant MD    Medical Diagnosis: S/P arthroscopy of right shoulder [Z98.890]  Complete tear of right rotator cuff, unspecified whether traumatic [M75.121]       Treatment Diagnosis: decreased R shoulder AROM/PROM, decreased B periscapular strength, decreased R shoulder strength, decreased activity tolerance, and decreased R shoulder joint capsule mobility inferior and posterior    Visit Information:  Insurance: Payor: [facility] / Plan: M[facility] / Product Type: *No Product type* /   PT Visit Information  Onset Date: 23  PT Insurance Information: BCBS  Total # of Visits Approved: 99  Total # of Visits to Date: 9  No Show: 0  Canceled Appointment: 2  Progress Note Counter:     Subjective Information:  Subjective: Pt reports R shoulder feels about the same. Say he was able to reach across the back to put his belt on.   HEP Compliance:  [x] Good [] Fair [] Poor [] Reports not doing due to:    Pain Screening  Patient Currently in Pain: No  Pain Assessment: 0-10  Pain Level: 0 (3/10 when waking up)  Pain Type: Surgical pain  Pain Location: Shoulder  Pain Orientation: Right  Pain Descriptors: Sore    Treatment:  Exercises:  Exercises  Exercise 1: Apollo cable press / rows (unilateral) 2 x 15 (30#/40#) (alternating sets)  Exercise 2: Apollo [employer] (unilateral) 2 x 15 (40#)  Exercise 3: Sleeper stretch 10 x 15\"  Exercise 4: IR stretch 10 x 10\"  Exercise 5: IR/ER 2 x 15 GTB  Exercise 6: UBE x 2.5/2.5 x 5 mins L 1.5   total F/R  Exercise 20: HEP: cont current     Objective Measures:      LTG 1 Current Status[de-identified] 23: R shoulder flexion: 169* abduction: 165*  IR: T11 ER: T3  LTG 2 Current Status[de-identified] 23: B periscapular strength: 5/5 , R shoulder strength 5/5  LTG 3 Current

## 2023-10-06 DIAGNOSIS — I10 ESSENTIAL HYPERTENSION: ICD-10-CM

## 2023-10-06 DIAGNOSIS — G89.29 CHRONIC LOW BACK PAIN, UNSPECIFIED BACK PAIN LATERALITY, UNSPECIFIED WHETHER SCIATICA PRESENT: ICD-10-CM

## 2023-10-06 DIAGNOSIS — M54.50 CHRONIC LOW BACK PAIN, UNSPECIFIED BACK PAIN LATERALITY, UNSPECIFIED WHETHER SCIATICA PRESENT: ICD-10-CM

## 2023-10-06 RX ORDER — AMLODIPINE BESYLATE 5 MG/1
TABLET ORAL
Qty: 90 TABLET | Refills: 3 | Status: SHIPPED | OUTPATIENT
Start: 2023-10-06

## 2023-10-06 RX ORDER — CELECOXIB 200 MG/1
CAPSULE ORAL
Qty: 90 CAPSULE | Refills: 2 | Status: SHIPPED | OUTPATIENT
Start: 2023-10-06

## 2023-10-09 ENCOUNTER — OFFICE VISIT (OUTPATIENT)
Dept: ORTHOPEDIC SURGERY | Age: 53
End: 2023-10-09

## 2023-10-09 DIAGNOSIS — M75.121 COMPLETE TEAR OF RIGHT ROTATOR CUFF, UNSPECIFIED WHETHER TRAUMATIC: ICD-10-CM

## 2023-10-09 DIAGNOSIS — Z98.890 S/P ARTHROSCOPY OF RIGHT SHOULDER: Primary | ICD-10-CM

## 2023-10-09 ASSESSMENT — ENCOUNTER SYMPTOMS
GASTROINTESTINAL NEGATIVE: 1
ALLERGIC/IMMUNOLOGIC NEGATIVE: 1
RESPIRATORY NEGATIVE: 1
EYES NEGATIVE: 1

## 2023-10-09 NOTE — PROGRESS NOTES
Orthopedic Surgery and Sports Medicine    Subjective:      Patient ID: Jayla Owens is a 46 y.o. male who presents today for:  Chief Complaint   Patient presents with    Follow-up     Follow up visit for S/P arthroscopy of right shoulder, says he is still having pain when arm is straight out       HPI  Simona Buckley is a 55-year-old male who presents today for postoperative evaluation. He is 8 months status post right shoulder arthroscopic rotator cuff repair and subacromial decompression. Date of surgery was 2/8/2023. He has completed a significant amount of physical therapy postoperatively. He has regained his range of motion. However he continues to report pain/discomfort over the lateral shoulder when he is holding his arm out at his side at 90 degrees. Past Medical History:   Diagnosis Date    Chronic back pain     GERD (gastroesophageal reflux disease)     Heartburn     HTN (hypertension)       Past Surgical History:   Procedure Laterality Date    APPENDECTOMY      BACK SURGERY  1997    L 4-5 microdisc.     COLONOSCOPY N/A 04/16/2021    COLORECTAL CANCER SCREENING, NOT HIGH RISK performed by Tal Pablo MD at [facility] ARTHROSCOPY Left 09/23/2020    ARTHROSCOPY LEFT KNEE, PARTIAL MEDIAL MENISECTOMY performed by Mio Maxwell MD at [address] [place] [address]    C 3/4 ACDF    PILONIDAL CYST EXCISION  2005    SHOULDER ARTHROSCOPY Right 2/8/2023    Right Arthroscopic rotator cuff repair, subacromial decompression performed by Ervin Pond MD at [address] [place] [address]  2008    Hiatal hernia, normal stomach, normal duodenum    UPPER GASTROINTESTINAL ENDOSCOPY N/A 01/06/2023    EGD ESOPHAGOGASTRODUODENOSCOPY performed by Tal Pablo MD at [address] [place] [address] History    Marital status:      Spouse name: Not on file    Number of children: Not on file    Years of education: Not on file    Highest

## 2023-10-13 ENCOUNTER — HOSPITAL ENCOUNTER (OUTPATIENT)
Dept: MRI IMAGING | Age: 53
Discharge: HOME | End: 2023-10-13
Attending: STUDENT IN AN ORGANIZED HEALTH CARE EDUCATION/TRAINING PROGRAM
Payer: COMMERCIAL

## 2023-10-13 DIAGNOSIS — Z98.890 S/P ARTHROSCOPY OF RIGHT SHOULDER: ICD-10-CM

## 2023-10-13 DIAGNOSIS — M75.121 COMPLETE TEAR OF RIGHT ROTATOR CUFF, UNSPECIFIED WHETHER TRAUMATIC: ICD-10-CM

## 2023-10-13 PROCEDURE — 73221 MRI JOINT UPR EXTREM W/O DYE: CPT

## 2023-10-17 ENCOUNTER — OFFICE VISIT (OUTPATIENT)
Dept: ORTHOPEDIC SURGERY | Age: 53
End: 2023-10-17

## 2023-10-17 DIAGNOSIS — Z98.890 S/P ARTHROSCOPY OF RIGHT SHOULDER: Primary | ICD-10-CM

## 2023-10-17 ASSESSMENT — ENCOUNTER SYMPTOMS
EYES NEGATIVE: 1
ALLERGIC/IMMUNOLOGIC NEGATIVE: 1
GASTROINTESTINAL NEGATIVE: 1
RESPIRATORY NEGATIVE: 1

## 2023-10-17 NOTE — PROGRESS NOTES
Orthopedic Surgery and Sports Medicine    Subjective:      Patient ID: Adrián Dockery is a 46 y.o. male who presents today for:  Chief Complaint   Patient presents with    Follow-up     Follow up visit for S/P arthroscopy of right shoulder and MRI results       HPI  Nadira Cabral is a 55-year-old male who presents today for postoperative evaluation. He is 8 months status post right shoulder arthroscopic rotator cuff repair and subacromial decompression. Date of surgery was 2/8/2023. He has completed a significant amount of physical therapy postoperatively. He has regained his range of motion. However he continues to report pain/discomfort over the lateral shoulder when he is holding his arm out at his side at 90 degrees. He states that that is really the only time he has any pain. Otherwise he states he is doing very well. He is here for MRI review. Past Medical History:   Diagnosis Date    Chronic back pain     GERD (gastroesophageal reflux disease)     Heartburn     HTN (hypertension)       Past Surgical History:   Procedure Laterality Date    APPENDECTOMY      BACK SURGERY  1997    L 4-5 microdisc.     COLONOSCOPY N/A 04/16/2021    COLORECTAL CANCER SCREENING, NOT HIGH RISK performed by Carlos Joyner MD at [facility] ARTHROSCOPY Left 09/23/2020    ARTHROSCOPY LEFT KNEE, PARTIAL MEDIAL MENISECTOMY performed by Marcelino Cortés MD at [address] [place] [address]  2012    C 3/4 ACDF    PILONIDAL CYST EXCISION  2005    SHOULDER ARTHROSCOPY Right 2/8/2023    Right Arthroscopic rotator cuff repair, subacromial decompression performed by Fco Templeton MD at [address] [place] [address]  2008    Hiatal hernia, normal stomach, normal duodenum    UPPER GASTROINTESTINAL ENDOSCOPY N/A 01/06/2023    EGD ESOPHAGOGASTRODUODENOSCOPY performed by Carlos Joyner MD at [address] [place] [address] History    Marital status:      Spouse name:

## 2024-01-07 DIAGNOSIS — K21.9 GASTROESOPHAGEAL REFLUX DISEASE, UNSPECIFIED WHETHER ESOPHAGITIS PRESENT: ICD-10-CM

## 2024-01-07 DIAGNOSIS — M54.50 CHRONIC LOW BACK PAIN: ICD-10-CM

## 2024-01-07 DIAGNOSIS — G89.29 CHRONIC LOW BACK PAIN: ICD-10-CM

## 2024-01-08 RX ORDER — GABAPENTIN 300 MG/1
CAPSULE ORAL
Qty: 90 CAPSULE | Refills: 1 | Status: SHIPPED | OUTPATIENT
Start: 2024-01-08 | End: 2024-07-06

## 2024-01-08 RX ORDER — OMEPRAZOLE 20 MG/1
CAPSULE, DELAYED RELEASE ORAL
Qty: 90 CAPSULE | Refills: 3 | Status: SHIPPED | OUTPATIENT
Start: 2024-01-08

## 2024-01-08 NOTE — TELEPHONE ENCOUNTER
Comments:     Last Office Visit (last PCP visit):   1/24/2023    Next Visit Date:  Future Appointments   Date Time Provider Department Center   2/6/2024 11:00 AM Susan Thurston MD [facility] OR [facility]       **If hasn't been seen in over a year OR hasn't followed up according to last diabetes/ADHD visit, make appointment for patient before sending refill to provider.    Rx requested:  Requested Prescriptions     Pending Prescriptions Disp Refills    gabapentin (NEURONTIN) 300 MG capsule [Pharmacy Med Name: GABAPENTIN 300MG CAPS] 90 capsule 1     Sig: TAKE ONE CAPSULE BY MOUTH EVERY EVENING    omeprazole (PRILOSEC) 20 MG delayed release capsule [Pharmacy Med Name: OMEPRAZOLE 20MG CPDR] 90 capsule 3     Sig: TAKE ONE CAPSULE BY MOUTH ONCE A DAY

## 2024-01-24 ENCOUNTER — OFFICE VISIT (OUTPATIENT)
Dept: FAMILY MEDICINE CLINIC | Age: 54
End: 2024-01-24
Payer: COMMERCIAL

## 2024-01-24 VITALS
HEIGHT: 74 IN | HEART RATE: 67 BPM | SYSTOLIC BLOOD PRESSURE: 130 MMHG | DIASTOLIC BLOOD PRESSURE: 88 MMHG | WEIGHT: 273 LBS | BODY MASS INDEX: 35.04 KG/M2 | TEMPERATURE: 98.7 F | OXYGEN SATURATION: 97 %

## 2024-01-24 DIAGNOSIS — R53.83 OTHER FATIGUE: ICD-10-CM

## 2024-01-24 DIAGNOSIS — K21.9 GASTROESOPHAGEAL REFLUX DISEASE, UNSPECIFIED WHETHER ESOPHAGITIS PRESENT: ICD-10-CM

## 2024-01-24 DIAGNOSIS — I10 ESSENTIAL HYPERTENSION: ICD-10-CM

## 2024-01-24 DIAGNOSIS — E66.01 SEVERE OBESITY (BMI 35.0-39.9) WITH COMORBIDITY (HCC): ICD-10-CM

## 2024-01-24 DIAGNOSIS — G89.29 CHRONIC LOW BACK PAIN, UNSPECIFIED BACK PAIN LATERALITY, UNSPECIFIED WHETHER SCIATICA PRESENT: ICD-10-CM

## 2024-01-24 DIAGNOSIS — R63.5 WEIGHT GAIN: ICD-10-CM

## 2024-01-24 DIAGNOSIS — M54.50 CHRONIC LOW BACK PAIN, UNSPECIFIED BACK PAIN LATERALITY, UNSPECIFIED WHETHER SCIATICA PRESENT: ICD-10-CM

## 2024-01-24 DIAGNOSIS — Z00.00 PREVENTATIVE HEALTH CARE: Primary | ICD-10-CM

## 2024-01-24 PROCEDURE — 3075F SYST BP GE 130 - 139MM HG: CPT | Performed by: FAMILY MEDICINE

## 2024-01-24 PROCEDURE — 99396 PREV VISIT EST AGE 40-64: CPT | Performed by: FAMILY MEDICINE

## 2024-01-24 PROCEDURE — 3079F DIAST BP 80-89 MM HG: CPT | Performed by: FAMILY MEDICINE

## 2024-01-24 NOTE — PROGRESS NOTES
Subjective:      Chief Complaint   Patient presents with    Anxiety     Check up, review meds     Weight Gain     Has gained 50 lbs since last visit, bp running high     Shoulder Pain     Shoulder and back pain         Feliberto Dejesus is a 53 y.o. male     Anxiety/insomnia  Sees psychiatry for his meds     Relying on xanax to get through things still    Has retired    Still with right shoulder pain    Slight anterior tear noted on MRI after surgery last year     Upset about weight gain     Wt Readings from Last 3 Encounters:   01/24/24 123.8 kg (273 lb)   07/17/23 103 kg (227 lb)   03/21/23 100.7 kg (222 lb)         Past Medical History:   Diagnosis Date    Chronic back pain     GERD (gastroesophageal reflux disease)     Heartburn     HTN (hypertension)      Past Surgical History:   Procedure Laterality Date    APPENDECTOMY      BACK SURGERY  1997    L 4-5 microdisc.    COLONOSCOPY N/A 04/16/2021    COLORECTAL CANCER SCREENING, NOT HIGH RISK performed by Bam Arnett MD at [facility]    KNEE ARTHROSCOPY Left 09/23/2020    ARTHROSCOPY LEFT KNEE, PARTIAL MEDIAL MENISECTOMY performed by Louis Schaeffer MD at [facility] OR    NECK SURGERY  2012    C 3/4 ACDF    PILONIDAL CYST EXCISION  2005    SHOULDER ARTHROSCOPY Right 2/8/2023    Right Arthroscopic rotator cuff repair, subacromial decompression performed by Susan Thurston MD at [facility] OR    TONSILLECTOMY      UPPER GASTROINTESTINAL ENDOSCOPY  2008    Hiatal hernia, normal stomach, normal duodenum    UPPER GASTROINTESTINAL ENDOSCOPY N/A 01/06/2023    EGD ESOPHAGOGASTRODUODENOSCOPY performed by Bam Arnett MD at [facility]     Family History   Problem Relation Age of Onset    High Blood Pressure Father     Diabetes Father     Crohn's Disease Brother     Coronary Art Dis Neg Hx     Colon Cancer Neg Hx      Social History     Socioeconomic History    Marital status:      Spouse name: None    Number of children: None    Years of education: None

## 2024-01-29 DIAGNOSIS — R53.83 OTHER FATIGUE: ICD-10-CM

## 2024-01-29 DIAGNOSIS — Z00.00 PREVENTATIVE HEALTH CARE: ICD-10-CM

## 2024-01-29 DIAGNOSIS — R63.5 WEIGHT GAIN: ICD-10-CM

## 2024-01-29 LAB
ALBUMIN SERPL-MCNC: 4.6 G/DL (ref 3.5–4.6)
ALP SERPL-CCNC: 57 U/L (ref 35–104)
ALT SERPL-CCNC: 18 U/L (ref 0–41)
ANION GAP SERPL CALCULATED.3IONS-SCNC: 12 MEQ/L (ref 9–15)
AST SERPL-CCNC: 20 U/L (ref 0–40)
BILIRUB SERPL-MCNC: 1.2 MG/DL (ref 0.2–0.7)
BUN SERPL-MCNC: 18 MG/DL (ref 6–20)
CALCIUM SERPL-MCNC: 9.1 MG/DL (ref 8.5–9.9)
CHLORIDE SERPL-SCNC: 104 MEQ/L (ref 95–107)
CHOLEST SERPL-MCNC: 177 MG/DL (ref 0–199)
CO2 SERPL-SCNC: 25 MEQ/L (ref 20–31)
CREAT SERPL-MCNC: 1.04 MG/DL (ref 0.7–1.2)
ERYTHROCYTE [DISTWIDTH] IN BLOOD BY AUTOMATED COUNT: 12.7 % (ref 11.5–14.5)
GLOBULIN SER CALC-MCNC: 2.7 G/DL (ref 2.3–3.5)
GLUCOSE SERPL-MCNC: 108 MG/DL (ref 70–99)
HBA1C MFR BLD: 5.1 % (ref 4.8–5.9)
HCT VFR BLD AUTO: 43.5 % (ref 42–52)
HDLC SERPL-MCNC: 49 MG/DL (ref 40–59)
HGB BLD-MCNC: 14.7 G/DL (ref 14–18)
LDLC SERPL CALC-MCNC: 117 MG/DL (ref 0–129)
MCH RBC QN AUTO: 29.8 PG (ref 27–31.3)
MCHC RBC AUTO-ENTMCNC: 33.8 % (ref 33–37)
MCV RBC AUTO: 88.2 FL (ref 79–92.2)
PLATELET # BLD AUTO: 297 K/UL (ref 130–400)
POTASSIUM SERPL-SCNC: 4.3 MEQ/L (ref 3.4–4.9)
PROT SERPL-MCNC: 7.3 G/DL (ref 6.3–8)
PSA SERPL-MCNC: 0.96 NG/ML (ref 0–4)
RBC # BLD AUTO: 4.93 M/UL (ref 4.7–6.1)
SODIUM SERPL-SCNC: 141 MEQ/L (ref 135–144)
TRIGL SERPL-MCNC: 56 MG/DL (ref 0–150)
TSH SERPL-MCNC: 1.24 UIU/ML (ref 0.44–3.86)
WBC # BLD AUTO: 6 K/UL (ref 4.8–10.8)

## 2024-01-30 LAB
SHBG SERPL-SCNC: 45 NMOL/L (ref 11–80)
TESTOST FREE SERPL-MCNC: 82.5 PG/ML (ref 47–244)
TESTOST SERPL-MCNC: 482 NG/DL (ref 220–1000)

## 2024-01-31 ENCOUNTER — TELEPHONE (OUTPATIENT)
Dept: FAMILY MEDICINE CLINIC | Age: 54
End: 2024-01-31

## 2024-01-31 NOTE — TELEPHONE ENCOUNTER
Unfortunately not now, not diabetic or prediabetic    Check with his insurance if they cover Wegovy

## 2024-01-31 NOTE — TELEPHONE ENCOUNTER
Pt is stating labs were normal and has concerns regarding his weight gain, asking if he qualifies for the weight loss shot is not able to pay $500 out of pocket for injection    Pt wants a call back [phone]

## 2024-02-05 NOTE — OP NOTE
Operative Note      Patient: Latoya Sánchez  YOB: 1970  MRN: [id number]    Date of Procedure: 2/8/2023    Pre-Op Diagnosis:   Right shoulder rotator cuff tear, traumatic, full-thickness    Right shoulder subacromial impingement    Post-Op Diagnosis: Same       Procedure(s):  Right shoulder arthroscopic rotator cuff repair   Subacromial decompression    Surgeon(s):  Kyle Whiting MD    Assistant:   First Assistant: [address]  Physician Assistant: Michoacano Lechuga PA-C    Anesthesia: General    Estimated Blood Loss (mL): less than 50     Complications: None    Specimens:   * No specimens in log *    Implants:  Implant Name Type Inv. Item Serial No.  Lot No. LRB No. Used Action   ANCHOR BONE SP FBRTAK RC TGRTPE BERYL  - [id number]  ANCHOR BONE SP FBRTAK RC TGRTPE BERYL  [id number] [employer] [id number] Right 1 Implanted   ANCHOR BONE SP FBRTAK RC TGRTPE WH/BLK  - [id number]  ANCHOR BONE SP FBRTAK RC TGRTPE WH/BLK  [id number] [employer] [id number] Right 1 Implanted   [facility] SUTURE BIOCOMP 4.75X19.1 MM Lorrain Radha [id number]  [facility] SUTURE BIOCOMP 4.75X19.1 MM [employer] [id number] Right 1 Implanted   [facility] SUTURE BIOCOMP 4.75X19.1 MM [employer]ha [id number]  [facility] SUTURE BIOCOMP 4.75X19.1 MM [employer] [id number] Right 1 Implanted         Drains: * No LDAs found *    Indications: 66-year-old male who sustained a injury to his right shoulder in September 2022. He had an MRI showing a complete tear of the supraspinatus tendon along with clinical signs of impingement. He failed conservative treatments including anti-inflammatory medication, physical therapy, and a cortisone injection. He continued to have significant pain and limitations in the right shoulder. Therefore he elected to proceed with surgical intervention.     Findings: Full-thickness tear of the anterior portion of the supraspinatus tendon without retraction    Detailed Description of Procedure:   Informed consent was obtained. The patient received a preoperative regional block by the anesthesia team.  The patient was taken to the operating room and placed supine on the table. The patient was then placed under general anesthesia. After adequate anesthesia, the patient was appropriately placed in the beachchair position with careful attention to neck positioning and padding around the face and bony prominences. Preoperative antibiotics were given for surgical prophylaxis. The right upper extremity was then prepped and draped in a normal sterile fashion using ChloraPrep. Prior to surgical intervention, a preoperative timeout was performed to identify the correct patient, procedure, laterality, and any concerns. A standard posterior arthroscopic portal was created. The arthroscope was inserted into the glenohumeral joint. Next a standard anterior portal was created just lateral to the coracoid process. A purple cannula was inserted. A diagnostic arthroscopy within the joint was performed. Findings are as follows:  Glenoid: Normal  Labrum: Normal  Humeral head: Normal   Long head of the biceps: Normal  Subscapularis: Intact  Undersurface rotator cuff: Tear visualize at the anterior portion of the supraspinatus   Axillary pouch: Normal      A spinal needle was inserted through the tear which was visualized from the undersurface of the rotator cuff. A Prolene suture was used to tag the rotator cuff tear. The arthroscope was then removed from the glenohumeral joint and the subacromial space was entered. A lateral portal was created and a Lavonne cannula was inserted. A bursectomy was then performed with the use of a shaver. The rotator cuff tear was then identified from above with the prolene suture traversing the tear. There was found to be a complete tear of the anterior supraspinatus with minimal retraction.   The rotator cuff tear and rotator cuff footprint were then debrided with the use of a shaver and tino. A grasper was used to mobilize the supraspinatus tendon to the footprint. There was found to be no tension. An Ar[employer] speed bridge repair of the rotator cuff was performed. 2 medial row FiberTak suture anchors were placed just lateral to the articular margin. These were then passed through the rotator cuff with a scorpion. A single FiberLink suture was inserted through the posterior dog ear. The suture tapes and FiberLink were then pulled over to 2 lateral row SwiveLock anchors. The supraspinatus tendon had great compression against the footprint. The shoulder was taken through a range of motion and found to have a solid stable repair. Lastly, the burner was used to clear off the undersurface of the acromioclavicular joint. A 5.0mm tino was then used to remove the inferior impinging osteophyte. The cannulas were then removed and the incision sites were irrigated with normal saline. Closure - The incision sites were closed with 2-0 Vicryl deep. The skin was closed with 3-0 Monocryl. The incisions were dressed with Steri-Strips, Xeroform, 4 x 4's, ABD pads, and Medipore tape. The patient was placed into an UltraSling. The patient was then awakened from anesthesia and taken to the PACU in stable condition without any complications. Post-operative Management: The patient will continue use of the UltraSling for 6 weeks. They will remove the sling for hand, wrist, and elbow range of motion only. He was given a prescription for Percocet for pain control after the block wears off. He will follow-up with me in clinic in 2 weeks.        Electronically signed by Jayna Wolfe MD on 2/9/2023 at 8:59 PM 6.5

## 2024-02-06 ENCOUNTER — OFFICE VISIT (OUTPATIENT)
Dept: ORTHOPEDIC SURGERY | Age: 54
End: 2024-02-06
Payer: COMMERCIAL

## 2024-02-06 DIAGNOSIS — Z98.890 S/P ARTHROSCOPY OF RIGHT SHOULDER: Primary | ICD-10-CM

## 2024-02-06 DIAGNOSIS — M75.121 COMPLETE TEAR OF RIGHT ROTATOR CUFF, UNSPECIFIED WHETHER TRAUMATIC: ICD-10-CM

## 2024-02-06 PROCEDURE — 99214 OFFICE O/P EST MOD 30 MIN: CPT | Performed by: STUDENT IN AN ORGANIZED HEALTH CARE EDUCATION/TRAINING PROGRAM

## 2024-02-06 ASSESSMENT — ENCOUNTER SYMPTOMS
GASTROINTESTINAL NEGATIVE: 1
RESPIRATORY NEGATIVE: 1
EYES NEGATIVE: 1
ALLERGIC/IMMUNOLOGIC NEGATIVE: 1

## 2024-02-06 NOTE — PROGRESS NOTES
happy with how things are going regarding his shoulder.  He may continue his activities as tolerated.  I will see him back as needed.    Return if symptoms worsen or fail to improve.    Susan Thurston MD

## 2024-02-07 NOTE — TELEPHONE ENCOUNTER
Pt aware of message. Thinks wegovy was discussed before and that it was around $1,000 for the injection. I did advise pt to check with insurance if weight loss medications are covered.

## 2024-07-01 DIAGNOSIS — M54.50 CHRONIC LOW BACK PAIN, UNSPECIFIED BACK PAIN LATERALITY, UNSPECIFIED WHETHER SCIATICA PRESENT: ICD-10-CM

## 2024-07-01 DIAGNOSIS — G89.29 CHRONIC LOW BACK PAIN, UNSPECIFIED BACK PAIN LATERALITY, UNSPECIFIED WHETHER SCIATICA PRESENT: ICD-10-CM

## 2024-07-01 RX ORDER — CELECOXIB 200 MG/1
CAPSULE ORAL
Qty: 90 CAPSULE | Refills: 2 | Status: SHIPPED | OUTPATIENT
Start: 2024-07-01

## 2024-07-01 NOTE — TELEPHONE ENCOUNTER
Comments: lindsayt sent    Last Office Visit (last PCP visit):   1/24/2024    Next Visit Date:  No future appointments.    **If hasn't been seen in over a year OR hasn't followed up according to last diabetes/ADHD visit, make appointment for patient before sending refill to provider.    Rx requested:  Requested Prescriptions     Pending Prescriptions Disp Refills    celecoxib (CELEBREX) 200 MG capsule [Pharmacy Med Name: CELECOXIB 200MG CAPS] 90 capsule 2     Sig: TAKE ONE CAPSULE BY MOUTH ONCE A DAY

## 2024-08-13 LAB
CHOLEST SERPL-MCNC: 148 MG/DL (ref 0–199)
GLUCOSE SERPL-MCNC: 99 MG/DL (ref 70–99)
HDLC SERPL-MCNC: 52 MG/DL (ref 40–59)
LDLC SERPL CALC-MCNC: 77 MG/DL (ref 0–129)
TRIGL SERPL-MCNC: 95 MG/DL (ref 0–150)

## 2024-10-07 SDOH — ECONOMIC STABILITY: FOOD INSECURITY: WITHIN THE PAST 12 MONTHS, THE FOOD YOU BOUGHT JUST DIDN'T LAST AND YOU DIDN'T HAVE MONEY TO GET MORE.: PATIENT DECLINED

## 2024-10-07 SDOH — ECONOMIC STABILITY: TRANSPORTATION INSECURITY
IN THE PAST 12 MONTHS, HAS LACK OF TRANSPORTATION KEPT YOU FROM MEETINGS, WORK, OR FROM GETTING THINGS NEEDED FOR DAILY LIVING?: PATIENT DECLINED

## 2024-10-07 SDOH — ECONOMIC STABILITY: INCOME INSECURITY: HOW HARD IS IT FOR YOU TO PAY FOR THE VERY BASICS LIKE FOOD, HOUSING, MEDICAL CARE, AND HEATING?: PATIENT DECLINED

## 2024-10-07 SDOH — ECONOMIC STABILITY: FOOD INSECURITY: WITHIN THE PAST 12 MONTHS, YOU WORRIED THAT YOUR FOOD WOULD RUN OUT BEFORE YOU GOT MONEY TO BUY MORE.: PATIENT DECLINED

## 2024-10-10 ENCOUNTER — OFFICE VISIT (OUTPATIENT)
Dept: FAMILY MEDICINE CLINIC | Age: 54
End: 2024-10-10
Payer: COMMERCIAL

## 2024-10-10 VITALS
SYSTOLIC BLOOD PRESSURE: 130 MMHG | DIASTOLIC BLOOD PRESSURE: 80 MMHG | TEMPERATURE: 98 F | HEART RATE: 72 BPM | OXYGEN SATURATION: 97 % | BODY MASS INDEX: 36.04 KG/M2 | HEIGHT: 74 IN | WEIGHT: 280.8 LBS

## 2024-10-10 DIAGNOSIS — M54.50 CHRONIC LOW BACK PAIN, UNSPECIFIED BACK PAIN LATERALITY, UNSPECIFIED WHETHER SCIATICA PRESENT: ICD-10-CM

## 2024-10-10 DIAGNOSIS — G89.29 CHRONIC LOW BACK PAIN, UNSPECIFIED BACK PAIN LATERALITY, UNSPECIFIED WHETHER SCIATICA PRESENT: ICD-10-CM

## 2024-10-10 DIAGNOSIS — R73.9 HYPERGLYCEMIA: Primary | ICD-10-CM

## 2024-10-10 DIAGNOSIS — K21.9 GASTROESOPHAGEAL REFLUX DISEASE, UNSPECIFIED WHETHER ESOPHAGITIS PRESENT: ICD-10-CM

## 2024-10-10 DIAGNOSIS — R73.9 HYPERGLYCEMIA: ICD-10-CM

## 2024-10-10 DIAGNOSIS — I10 ESSENTIAL HYPERTENSION: ICD-10-CM

## 2024-10-10 PROCEDURE — 3075F SYST BP GE 130 - 139MM HG: CPT | Performed by: FAMILY MEDICINE

## 2024-10-10 PROCEDURE — 99214 OFFICE O/P EST MOD 30 MIN: CPT | Performed by: FAMILY MEDICINE

## 2024-10-10 PROCEDURE — 3079F DIAST BP 80-89 MM HG: CPT | Performed by: FAMILY MEDICINE

## 2024-10-10 RX ORDER — GABAPENTIN 300 MG/1
300 CAPSULE ORAL EVERY EVENING
Qty: 90 CAPSULE | Refills: 3 | Status: SHIPPED | OUTPATIENT
Start: 2024-10-10 | End: 2025-10-05

## 2024-10-10 RX ORDER — CELECOXIB 200 MG/1
CAPSULE ORAL
Qty: 90 CAPSULE | Refills: 3 | Status: SHIPPED | OUTPATIENT
Start: 2024-10-10

## 2024-10-10 RX ORDER — AMLODIPINE BESYLATE 5 MG/1
TABLET ORAL
Qty: 90 TABLET | Refills: 3 | Status: SHIPPED | OUTPATIENT
Start: 2024-10-10

## 2024-10-10 NOTE — PROGRESS NOTES
Subjective:      Chief Complaint   Patient presents with    Immunizations     Discuss hepatitis         Feliberto Dejesus is a 53 y.o. male     Anxiety/insomnia  Sees psychiatry for his meds     Relying on xanax to get through things still    Has retired      Wt Readings from Last 3 Encounters:   10/10/24 127.4 kg (280 lb 12.8 oz)   24 123.8 kg (273 lb)   23 103 kg (227 lb)         Past Medical History:   Diagnosis Date    Chronic back pain     GERD (gastroesophageal reflux disease)     Heartburn     HTN (hypertension)      Past Surgical History:   Procedure Laterality Date    APPENDECTOMY      BACK SURGERY      L 4-5 microdisc.    COLONOSCOPY N/A 2021    COLORECTAL CANCER SCREENING, NOT HIGH RISK performed by Bam Arnett MD at [facility]    KNEE ARTHROSCOPY Left 2020    ARTHROSCOPY LEFT KNEE, PARTIAL MEDIAL MENISECTOMY performed by Louis Schaeffer MD at [facility]    NECK SURGERY      C 3/4 ACDF    PILONIDAL CYST EXCISION  2005    SHOULDER ARTHROSCOPY Right 2023    Right Arthroscopic rotator cuff repair, subacromial decompression performed by Susan Thurston MD at [facility]    TONSILLECTOMY      UPPER GASTROINTESTINAL ENDOSCOPY      Hiatal hernia, normal stomach, normal duodenum    UPPER GASTROINTESTINAL ENDOSCOPY N/A 2023    EGD ESOPHAGOGASTRODUODENOSCOPY performed by Bam Arnett MD at [facility]     Family History   Problem Relation Age of Onset    High Blood Pressure Father     Diabetes Father     Crohn's Disease Brother     Coronary Art Dis Neg Hx     Colon Cancer Neg Hx      Social History     Socioeconomic History    Marital status:      Spouse name: None    Number of children: None    Years of education: None    Highest education level: None   Tobacco Use    Smoking status: Former     Current packs/day: 0.00     Types: Cigarettes     Quit date: 2002     Years since quittin.7    Smokeless tobacco: Never    Tobacco comments:

## 2024-10-11 LAB
ESTIMATED AVERAGE GLUCOSE: 100 MG/DL
HBA1C MFR BLD: 5.1 % (ref 4–6)

## 2025-04-07 SDOH — ECONOMIC STABILITY: FOOD INSECURITY: WITHIN THE PAST 12 MONTHS, YOU WORRIED THAT YOUR FOOD WOULD RUN OUT BEFORE YOU GOT MONEY TO BUY MORE.: NEVER TRUE

## 2025-04-07 SDOH — ECONOMIC STABILITY: INCOME INSECURITY: IN THE LAST 12 MONTHS, WAS THERE A TIME WHEN YOU WERE NOT ABLE TO PAY THE MORTGAGE OR RENT ON TIME?: NO

## 2025-04-07 SDOH — ECONOMIC STABILITY: FOOD INSECURITY: WITHIN THE PAST 12 MONTHS, THE FOOD YOU BOUGHT JUST DIDN'T LAST AND YOU DIDN'T HAVE MONEY TO GET MORE.: NEVER TRUE

## 2025-04-07 ASSESSMENT — PATIENT HEALTH QUESTIONNAIRE - PHQ9
1. LITTLE INTEREST OR PLEASURE IN DOING THINGS: NOT AT ALL
2. FEELING DOWN, DEPRESSED OR HOPELESS: NOT AT ALL
SUM OF ALL RESPONSES TO PHQ QUESTIONS 1-9: 0
SUM OF ALL RESPONSES TO PHQ QUESTIONS 1-9: 0
SUM OF ALL RESPONSES TO PHQ9 QUESTIONS 1 & 2: 0
1. LITTLE INTEREST OR PLEASURE IN DOING THINGS: NOT AT ALL
SUM OF ALL RESPONSES TO PHQ QUESTIONS 1-9: 0
2. FEELING DOWN, DEPRESSED OR HOPELESS: NOT AT ALL
SUM OF ALL RESPONSES TO PHQ QUESTIONS 1-9: 0

## 2025-04-10 ENCOUNTER — OFFICE VISIT (OUTPATIENT)
Dept: FAMILY MEDICINE CLINIC | Age: 55
End: 2025-04-10
Payer: COMMERCIAL

## 2025-04-10 VITALS
DIASTOLIC BLOOD PRESSURE: 80 MMHG | WEIGHT: 285 LBS | HEART RATE: 74 BPM | HEIGHT: 74 IN | SYSTOLIC BLOOD PRESSURE: 130 MMHG | TEMPERATURE: 97.9 F | BODY MASS INDEX: 36.57 KG/M2 | OXYGEN SATURATION: 99 %

## 2025-04-10 DIAGNOSIS — R73.9 HYPERGLYCEMIA: ICD-10-CM

## 2025-04-10 DIAGNOSIS — M54.50 CHRONIC LOW BACK PAIN, UNSPECIFIED BACK PAIN LATERALITY, UNSPECIFIED WHETHER SCIATICA PRESENT: ICD-10-CM

## 2025-04-10 DIAGNOSIS — G89.29 CHRONIC LOW BACK PAIN, UNSPECIFIED BACK PAIN LATERALITY, UNSPECIFIED WHETHER SCIATICA PRESENT: ICD-10-CM

## 2025-04-10 DIAGNOSIS — I10 ESSENTIAL HYPERTENSION: ICD-10-CM

## 2025-04-10 DIAGNOSIS — Z00.00 PREVENTATIVE HEALTH CARE: Primary | ICD-10-CM

## 2025-04-10 DIAGNOSIS — Z00.00 PREVENTATIVE HEALTH CARE: ICD-10-CM

## 2025-04-10 LAB
ALBUMIN SERPL-MCNC: 4.9 G/DL (ref 3.5–4.6)
ALP SERPL-CCNC: 58 U/L (ref 35–104)
ALT SERPL-CCNC: 38 U/L (ref 0–41)
ANION GAP SERPL CALCULATED.3IONS-SCNC: 9 MEQ/L (ref 9–15)
AST SERPL-CCNC: 26 U/L (ref 0–40)
BILIRUB SERPL-MCNC: 1.2 MG/DL (ref 0.2–0.7)
BUN SERPL-MCNC: 14 MG/DL (ref 6–20)
CALCIUM SERPL-MCNC: 9.5 MG/DL (ref 8.5–9.9)
CHLORIDE SERPL-SCNC: 104 MEQ/L (ref 95–107)
CHOLEST SERPL-MCNC: 173 MG/DL (ref 0–199)
CO2 SERPL-SCNC: 28 MEQ/L (ref 20–31)
CREAT SERPL-MCNC: 0.95 MG/DL (ref 0.7–1.2)
ERYTHROCYTE [DISTWIDTH] IN BLOOD BY AUTOMATED COUNT: 12.5 % (ref 11.5–14.5)
GLOBULIN SER CALC-MCNC: 3.1 G/DL (ref 2.3–3.5)
GLUCOSE SERPL-MCNC: 87 MG/DL (ref 70–99)
HCT VFR BLD AUTO: 46.2 % (ref 42–52)
HDLC SERPL-MCNC: 50 MG/DL (ref 40–59)
HGB BLD-MCNC: 15.8 G/DL (ref 14–18)
LDLC SERPL CALC-MCNC: 110 MG/DL (ref 0–129)
MCH RBC QN AUTO: 29.8 PG (ref 27–31.3)
MCHC RBC AUTO-ENTMCNC: 34.2 % (ref 33–37)
MCV RBC AUTO: 87.2 FL (ref 79–92.2)
PLATELET # BLD AUTO: 340 K/UL (ref 130–400)
POTASSIUM SERPL-SCNC: 4.2 MEQ/L (ref 3.4–4.9)
PROT SERPL-MCNC: 8 G/DL (ref 6.3–8)
RBC # BLD AUTO: 5.3 M/UL (ref 4.7–6.1)
SODIUM SERPL-SCNC: 141 MEQ/L (ref 135–144)
TRIGL SERPL-MCNC: 66 MG/DL (ref 0–150)
TSH SERPL-MCNC: 1.02 UIU/ML (ref 0.44–3.86)
WBC # BLD AUTO: 7.3 K/UL (ref 4.8–10.8)

## 2025-04-10 PROCEDURE — 99396 PREV VISIT EST AGE 40-64: CPT | Performed by: FAMILY MEDICINE

## 2025-04-10 PROCEDURE — 3079F DIAST BP 80-89 MM HG: CPT | Performed by: FAMILY MEDICINE

## 2025-04-10 PROCEDURE — 3075F SYST BP GE 130 - 139MM HG: CPT | Performed by: FAMILY MEDICINE

## 2025-04-10 NOTE — PROGRESS NOTES
stools  Genito-Urinary ROS: no dysuria, trouble voiding, or hematuria  Musculoskeletal ROS: chronic low back pain  Neurological ROS: negative for - behavioral changes, memory loss, numbness/tingling, tremors or weakness    Exercise: walking at work, not really with extra exercise    In general patient otherwise reports feeling well.       Objective:     Physical Exam:  /80 (BP Site: Right Upper Arm)   Pulse 74   Temp 97.9 °F (36.6 °C)   Ht 1.88 m (6' 2\")   Wt 129.3 kg (285 lb)   SpO2 99%   BMI 36.59 kg/m²       Gen: Well, NAD, Alert, Oriented x 3   HEENT: EOMI, eyes clear, MMM  Skin: without rash or jaundice  Neck: no significant lymphadenopathy or thyromegaly  Lungs: CTA B w/out Rales/Wheezes/Rhonchi, Good respiratory effort   Heart: RRR, S1S2, w/out M/R/G, non-displaced PMI   Neuro: Neurovascularly intact w/ Sensory/Motor intact UE/LE Bilaterally.  Psych: some anxiety     Assessment:      Diagnosis Orders   1. Preventative health care  Lipid Panel    CBC    Comprehensive Metabolic Panel    TSH    Hemoglobin A1C      2. Chronic low back pain, unspecified back pain laterality, unspecified whether sciatica present  nabumetone (RELAFEN) 750 MG tablet    Ambulatory referral to Pain Medicine    XR LUMBAR SPINE (MIN 4 VIEWS)      3. Essential hypertension        4. Hyperglycemia                     Plan:       Follow with psychiatry            Labs as ordered             Edd Mcknight MD

## 2025-04-11 ENCOUNTER — RESULTS FOLLOW-UP (OUTPATIENT)
Dept: FAMILY MEDICINE CLINIC | Age: 55
End: 2025-04-11

## 2025-04-11 LAB
ESTIMATED AVERAGE GLUCOSE: 97 MG/DL
HBA1C MFR BLD: 5 % (ref 4–6)

## 2025-08-08 DIAGNOSIS — M54.50 CHRONIC LOW BACK PAIN, UNSPECIFIED BACK PAIN LATERALITY, UNSPECIFIED WHETHER SCIATICA PRESENT: ICD-10-CM

## 2025-08-08 DIAGNOSIS — G89.29 CHRONIC LOW BACK PAIN, UNSPECIFIED BACK PAIN LATERALITY, UNSPECIFIED WHETHER SCIATICA PRESENT: ICD-10-CM

## (undated) DEVICE — SYRINGE MED 50ML LUERLOCK TIP

## (undated) DEVICE — SLING ORTH COMFORTABLE LG 13-15 IN HND STRP HK ULTRASLING II

## (undated) DEVICE — GLOVE ORANGE PI 8   MSG9080

## (undated) DEVICE — PADDING UNDERCAST W6INXL4YD RAYON POLY SYN NONADHESIVE

## (undated) DEVICE — BRUSH ENDO COMBO

## (undated) DEVICE — BANDAGE COMPR M W6INXL10YD WHT BGE VELC E MTRX HK AND LOOP

## (undated) DEVICE — TOWEL,OR,DSP,ST,BLUE,STD,4/PK,20PK/CS: Brand: MEDLINE

## (undated) DEVICE — BRUSH ENDO CLN L90.5IN SHTH DIA1.7MM BRIST DIA5-7MM 2-6MM

## (undated) DEVICE — HYPODERMIC SAFETY NEEDLE: Brand: MAGELLAN

## (undated) DEVICE — APPLICATOR MEDICATED 26 CC SOLUTION HI LT ORNG CHLORAPREP

## (undated) DEVICE — SYRINGE MED 10ML LUERLOCK TIP W/O SFTY DISP

## (undated) DEVICE — INTENDED FOR TISSUE SEPARATION, AND OTHER PROCEDURES THAT REQUIRE A SHARP SURGICAL BLADE TO PUNCTURE OR CUT.: Brand: BARD-PARKER ® CARBON RIB-BACK BLADES

## (undated) DEVICE — TUBING, SUCTION, 1/4" X 10', STRAIGHT: Brand: MEDLINE

## (undated) DEVICE — SINGLE PORT MANIFOLD: Brand: NEPTUNE 2

## (undated) DEVICE — Device: Brand: ENDO SMARTCAP

## (undated) DEVICE — CANNULA ARTHSCP L7CM DIA7MM TRNSLUC THRD FLX W/ NO SQUIRT

## (undated) DEVICE — 90-S CRUISE, SUCTION PROBE, NON-BENDABLE, MAX CUT LEVEL 1: Brand: SERFAS ENERGY

## (undated) DEVICE — ENDO CARRY-ON PROCEDURE KIT: Brand: ENDO CARRY-ON PROCEDURE KIT

## (undated) DEVICE — COVER LT HNDL BLU PLAS

## (undated) DEVICE — TUBING PMP L8FT LNG W/ CONN FOR AR-6400 REDEUCE

## (undated) DEVICE — [AGGRESSIVE PLUS CUTTER, ARTHROSCOPIC SHAVER BLADE,  DO NOT RESTERILIZE,  DO NOT USE IF PACKAGE IS DAMAGED,  KEEP DRY,  KEEP AWAY FROM SUNLIGHT]: Brand: FORMULA

## (undated) DEVICE — GOWN,AURORA,NONREINFORCED,LARGE: Brand: MEDLINE

## (undated) DEVICE — STOCKINETTE,IMPERVIOUS,12X48,STERILE: Brand: MEDLINE

## (undated) DEVICE — SPONGE,LAP,18"X18",DLX,XR,ST,5/PK,40/PK: Brand: MEDLINE

## (undated) DEVICE — PACK,SET UP,DRAPE: Brand: MEDLINE

## (undated) DEVICE — COVER FT SWCH W15XL17IN GRY ALL OEC SYS

## (undated) DEVICE — LABEL MED MINI W/ MARKER

## (undated) DEVICE — SUTURE MCRYL SZ 3-0 L27IN ABSRB UD L19MM PS-2 3/8 CIR PRIM Y427H

## (undated) DEVICE — 4-PORT MANIFOLD: Brand: NEPTUNE 2

## (undated) DEVICE — CANNULA ARTHSCP DEPLOYABLE LO PROF GEM SR8

## (undated) DEVICE — BANDAGE,ELASTIC,ESMARK,STERILE,6"X9',LF: Brand: MEDLINE

## (undated) DEVICE — TUBE SET 96 MM 64 MM H2O PERISTALTIC STD AUX CHANNEL

## (undated) DEVICE — ELECTRODE PT RET AD L9FT HI MOIST COND ADH HYDRGEL CORDED

## (undated) DEVICE — NEEDLE SPNL 18GA L3.5IN W/ QNCKE SHARPER BVL DURA CLICK

## (undated) DEVICE — COUNTER NDL 40 COUNT HLD 70 FOAM BLK ADH W/ MAG

## (undated) DEVICE — FORCEPS BX L240CM JAW DIA2.4MM ORNG L CAP W/ NDL DISP RAD

## (undated) DEVICE — SUTURE VCRL SZ 2-0 L27IN ABSRB UD L26MM SH 1/2 CIR J417H

## (undated) DEVICE — PACK,BASIC: Brand: MEDLINE

## (undated) DEVICE — CONMED SCOPE SAVER BITE BLOCK, 20X27 MM: Brand: SCOPE SAVER

## (undated) DEVICE — DRAPE,U/ SHT,SPLIT,PLAS,STERIL: Brand: MEDLINE

## (undated) DEVICE — TUBING, SUCTION, 9/32" X 12', STRAIGHT: Brand: MEDLINE INDUSTRIES, INC.

## (undated) DEVICE — 3M™ STERI-DRAPE™ U-DRAPE 1015: Brand: STERI-DRAPE™

## (undated) DEVICE — SPONGE GZ W4XL4IN COT 12 PLY TYP VII WVN C FLD DSGN

## (undated) DEVICE — GOWN,SIRUS,POLYRNF,BRTHSLV,XLN/XL,20/CS: Brand: MEDLINE

## (undated) DEVICE — MARKER SURG SKIN GENTIAN VLT REG TIP W/ 6IN RUL

## (undated) DEVICE — CONNECTOR,T,STERILE: Brand: MEDLINE

## (undated) DEVICE — GLOVE ORANGE PI 7 1/2   MSG9075

## (undated) DEVICE — PAD,ABDOMINAL,8"X10",ST,LF: Brand: MEDLINE

## (undated) DEVICE — ADAPTER FLSH PMP FLD MGMT GI IRRIG OFP 2 DISPOSABLE

## (undated) DEVICE — ZIMMER® STERILE DISPOSABLE TOURNIQUET CUFF, DUAL PORT, SINGLE BLADDER, 34 IN. (86 CM)

## (undated) DEVICE — [TOMCAT CUTTER, ARTHROSCOPIC SHAVER BLADE,  DO NOT RESTERILIZE,  DO NOT USE IF PACKAGE IS DAMAGED,  KEEP DRY,  KEEP AWAY FROM SUNLIGHT]: Brand: FORMULA

## (undated) DEVICE — SUTURE FIBERLINK SZ 2-0 L26IN NONABSORBABLE BLU CLS LOOP AR7235

## (undated) DEVICE — SPONGE GZ W4XL4IN COT 12 PLY TYP VII WVN C FLD DSGN STERILE

## (undated) DEVICE — KIT DISP W/ SPEAR TRCR TIP OBT AND 2.6MM DRL FOR 2.6

## (undated) DEVICE — TUBING SET, GRAVITY, 4-SPIKE: Brand: CONMED

## (undated) DEVICE — PADDING CAST W6INXL4YD COT LO LINTING WYTEX

## (undated) DEVICE — MAT FLR ABSRB ECODRI-SAFE

## (undated) DEVICE — KIT CHAIR TRIMANO FOAM W/ SUPP ARM DRP ERGONOMICALLY DESIGNED

## (undated) DEVICE — [AGGRESSIVE 6-FLUTE BARREL BUR, ARTHROSCOPIC SHAVER BLADE,  DO NOT RESTERILIZE,  DO NOT USE IF PACKAGE IS DAMAGED,  KEEP DRY,  KEEP AWAY FROM SUNLIGHT]: Brand: FORMULA

## (undated) DEVICE — SHEET,DRAPE,53X77,STERILE: Brand: MEDLINE

## (undated) DEVICE — SUTURE MCRYL SZ 4-0 L27IN ABSRB UD L19MM PS-2 1/2 CIR PRIM Y426H

## (undated) DEVICE — 3M™ STERI-DRAPE™ ARTHROSCOPY SHEET WITH POUCH 1194: Brand: STERI-DRAPE™

## (undated) DEVICE — BANDAGE COMPR W3INXL15FT BGE E SGL LAYERED CLP CLSR

## (undated) DEVICE — GLOVE ORANGE PI 7   MSG9070

## (undated) DEVICE — NEEDLE SUT PASS FOR ROT CUF LABRAL REP MULTFI SCORPION

## (undated) DEVICE — 3M™ STERI-STRIP™ REINFORCED ADHESIVE SKIN CLOSURES, R1547, 1/2 IN X 4 IN (12 MM X 100 MM), 6 STRIPS/ENVELOPE: Brand: 3M™ STERI-STRIP™